# Patient Record
Sex: FEMALE | Race: WHITE | HISPANIC OR LATINO | Employment: UNEMPLOYED | URBAN - METROPOLITAN AREA
[De-identification: names, ages, dates, MRNs, and addresses within clinical notes are randomized per-mention and may not be internally consistent; named-entity substitution may affect disease eponyms.]

---

## 2021-02-26 ENCOUNTER — APPOINTMENT (OUTPATIENT)
Dept: RADIOLOGY | Facility: MEDICAL CENTER | Age: 51
End: 2021-02-26
Attending: EMERGENCY MEDICINE
Payer: MEDICAID

## 2021-02-26 ENCOUNTER — HOSPITAL ENCOUNTER (EMERGENCY)
Facility: MEDICAL CENTER | Age: 51
End: 2021-02-26
Attending: EMERGENCY MEDICINE
Payer: MEDICAID

## 2021-02-26 ENCOUNTER — APPOINTMENT (OUTPATIENT)
Dept: RADIOLOGY | Facility: MEDICAL CENTER | Age: 51
End: 2021-02-26
Payer: MEDICAID

## 2021-02-26 VITALS
RESPIRATION RATE: 27 BRPM | HEART RATE: 82 BPM | DIASTOLIC BLOOD PRESSURE: 65 MMHG | BODY MASS INDEX: 51.91 KG/M2 | WEIGHT: 293 LBS | SYSTOLIC BLOOD PRESSURE: 125 MMHG | HEIGHT: 63 IN | OXYGEN SATURATION: 90 % | TEMPERATURE: 97 F

## 2021-02-26 DIAGNOSIS — R60.0 BILATERAL LOWER EXTREMITY EDEMA: ICD-10-CM

## 2021-02-26 DIAGNOSIS — R06.09 DYSPNEA ON EXERTION: ICD-10-CM

## 2021-02-26 LAB
ALBUMIN SERPL BCP-MCNC: 3.6 G/DL (ref 3.2–4.9)
ALBUMIN/GLOB SERPL: 1 G/DL
ALP SERPL-CCNC: 98 U/L (ref 30–99)
ALT SERPL-CCNC: 44 U/L (ref 2–50)
ANION GAP SERPL CALC-SCNC: 12 MMOL/L (ref 7–16)
ANISOCYTOSIS BLD QL SMEAR: ABNORMAL
AST SERPL-CCNC: 67 U/L (ref 12–45)
BASOPHILS # BLD AUTO: 0 % (ref 0–1.8)
BASOPHILS # BLD: 0 K/UL (ref 0–0.12)
BILIRUB SERPL-MCNC: 0.7 MG/DL (ref 0.1–1.5)
BUN SERPL-MCNC: 12 MG/DL (ref 8–22)
CALCIUM SERPL-MCNC: 9.6 MG/DL (ref 8.5–10.5)
CHLORIDE SERPL-SCNC: 103 MMOL/L (ref 96–112)
CO2 SERPL-SCNC: 21 MMOL/L (ref 20–33)
CREAT SERPL-MCNC: 0.44 MG/DL (ref 0.5–1.4)
EKG IMPRESSION: NORMAL
EOSINOPHIL # BLD AUTO: 0.07 K/UL (ref 0–0.51)
EOSINOPHIL NFR BLD: 0.9 % (ref 0–6.9)
ERYTHROCYTE [DISTWIDTH] IN BLOOD BY AUTOMATED COUNT: 55.2 FL (ref 35.9–50)
GLOBULIN SER CALC-MCNC: 3.7 G/DL (ref 1.9–3.5)
GLUCOSE SERPL-MCNC: 189 MG/DL (ref 65–99)
HCT VFR BLD AUTO: 34.3 % (ref 37–47)
HGB BLD-MCNC: 10.3 G/DL (ref 12–16)
LYMPHOCYTES # BLD AUTO: 1.42 K/UL (ref 1–4.8)
LYMPHOCYTES NFR BLD: 17.5 % (ref 22–41)
MACROCYTES BLD QL SMEAR: ABNORMAL
MANUAL DIFF BLD: NORMAL
MCH RBC QN AUTO: 26.1 PG (ref 27–33)
MCHC RBC AUTO-ENTMCNC: 30 G/DL (ref 33.6–35)
MCV RBC AUTO: 86.8 FL (ref 81.4–97.8)
METAMYELOCYTES NFR BLD MANUAL: 0.9 %
MICROCYTES BLD QL SMEAR: ABNORMAL
MONOCYTES # BLD AUTO: 0.78 K/UL (ref 0–0.85)
MONOCYTES NFR BLD AUTO: 9.6 % (ref 0–13.4)
MORPHOLOGY BLD-IMP: NORMAL
NEUTROPHILS # BLD AUTO: 5.76 K/UL (ref 2–7.15)
NEUTROPHILS NFR BLD: 71.1 % (ref 44–72)
NRBC # BLD AUTO: 0.02 K/UL
NRBC BLD-RTO: 0.2 /100 WBC
NT-PROBNP SERPL IA-MCNC: 131 PG/ML (ref 0–125)
PLATELET # BLD AUTO: 333 K/UL (ref 164–446)
PLATELET BLD QL SMEAR: NORMAL
PMV BLD AUTO: 10.3 FL (ref 9–12.9)
POLYCHROMASIA BLD QL SMEAR: NORMAL
POTASSIUM SERPL-SCNC: 4.4 MMOL/L (ref 3.6–5.5)
PROT SERPL-MCNC: 7.3 G/DL (ref 6–8.2)
RBC # BLD AUTO: 3.95 M/UL (ref 4.2–5.4)
RBC BLD AUTO: PRESENT
SODIUM SERPL-SCNC: 136 MMOL/L (ref 135–145)
TROPONIN T SERPL-MCNC: 8 NG/L (ref 6–19)
WBC # BLD AUTO: 8.1 K/UL (ref 4.8–10.8)

## 2021-02-26 PROCEDURE — 80053 COMPREHEN METABOLIC PANEL: CPT

## 2021-02-26 PROCEDURE — 85007 BL SMEAR W/DIFF WBC COUNT: CPT

## 2021-02-26 PROCEDURE — 93005 ELECTROCARDIOGRAM TRACING: CPT

## 2021-02-26 PROCEDURE — 83880 ASSAY OF NATRIURETIC PEPTIDE: CPT

## 2021-02-26 PROCEDURE — 84484 ASSAY OF TROPONIN QUANT: CPT

## 2021-02-26 PROCEDURE — 99284 EMERGENCY DEPT VISIT MOD MDM: CPT

## 2021-02-26 PROCEDURE — 71045 X-RAY EXAM CHEST 1 VIEW: CPT

## 2021-02-26 PROCEDURE — 93970 EXTREMITY STUDY: CPT

## 2021-02-26 PROCEDURE — 85027 COMPLETE CBC AUTOMATED: CPT

## 2021-02-26 PROCEDURE — 93005 ELECTROCARDIOGRAM TRACING: CPT | Performed by: EMERGENCY MEDICINE

## 2021-02-26 RX ORDER — FUROSEMIDE 40 MG/1
40 TABLET ORAL 2 TIMES DAILY
COMMUNITY

## 2021-02-27 NOTE — ED PROVIDER NOTES
"ED Provider Note    CHIEF COMPLAINT  Chief Complaint   Patient presents with   • Leg Swelling     pt sent in by MD for bilat leg swelling x 2 weeks; denies cardiac hx or hx of DVT or PE; pt states her MD wants her to get a cardiac echo and leg US   • Shortness of Breath     with exertion   • Visual Problems     pt reports new onset trouble reading small print x 1 week        HPI    Primary care provider: No primary care provider on file.   History obtained from: Patient  History limited by: None     Moni Wray is a 50 y.o. female who presents to the ED complaining of worsening bilateral lower extremity swelling over the past 2 weeks as well as shortness of breath with exertion. She also noticed difficulty with reading small print for a week. Patient states that she was admitted to a hospital in [place] earlier this month for similar symptoms and was told that she has diabetes and \"big liver.\" She has been using the insulin and Lasix as prescribed without improvement. She denies any fever/congestion/sore throat/cough/nausea/vomiting/diarrhea/dysuria/rash. She denies possibility of pregnancy with history of tubal ligation and states that she is not sexually active. She denies any pain except knee pain \"because I have arthritis.\" She denies known history of heart problems or blood clots.    REVIEW OF SYSTEMS  Please see HPI for pertinent positives/negatives.  All other systems reviewed and are negative.     PAST MEDICAL HISTORY  Past Medical History:   Diagnosis Date   • Diabetes (HCC)         SURGICAL HISTORY  Past Surgical History:   Procedure Laterality Date   • CHOLECYSTECTOMY          SOCIAL HISTORY  Social History     Tobacco Use   • Smoking status: Current Some Day Smoker   • Smokeless tobacco: Never Used   Substance and Sexual Activity   • Alcohol use: Never   • Drug use: Never   • Sexual activity: Not on file        FAMILY HISTORY  History reviewed. No pertinent family history.     CURRENT " "MEDICATIONS  Home Medications     Reviewed by Linsey Lam R.N. (Registered Nurse) on 02/26/21 at 1634  Med List Status: Not Addressed   Medication Last Dose Status   furosemide (LASIX) 20 MG Tab  Active   insulin lispro (HUMALOG) 100 UNIT/ML  Active                 ALLERGIES  No Known Allergies     PHYSICAL EXAM  VITAL SIGNS: /65   Pulse 82   Temp 36.1 °C (97 °F) (Temporal)   Resp (!) 27   Ht 1.6 m (5' 3\")   Wt (!) 171 kg (376 lb 8.7 oz)   SpO2 90%   BMI 66.70 kg/m²  @[id number]::@     Pulse ox interpretation: 96% I interpret this pulse ox as normal     Cardiac monitor interpretation: Sinus rhythm with heart rate in the 90s as interpreted by me.  The patient presented with dyspnea and cardiac monitor was ordered to monitor for dysrhythmia.    Constitutional: Well developed, well nourished, alert in no apparent distress, nontoxic appearance    HENT: No external signs of trauma, normocephalic, mask on due to COVID-19 pandemic  Eyes: PERRL, conjunctiva without erythema, no discharge, no icterus    Neck: Soft and supple, trachea midline, no stridor, no tenderness, no LAD, no JVD, good ROM    Cardiovascular: Regular rate and rhythm, no murmurs/rubs/gallops, strong distal pulses and good perfusion    Thorax & Lungs: No respiratory distress, CTAB   Abdomen: Soft, nontender, nondistended, no guarding, no rebound, normal BS    Back: No CVAT    Extremities: No cyanosis, bilateral lower extremity edema, no gross deformity, good ROM, no tenderness, intact distal pulses with brisk cap refill    Skin: Warm, dry, no pallor/cyanosis, no rash noted except mild diffuse nondiscrete erythema of both lower legs  Lymphatic: No lymphadenopathy noted    Neuro: A/O times 3, no focal deficits noted    Psychiatric: Cooperative, normal mood and affect, normal judgement, appropriate for clinical situation        DIAGNOSTIC STUDIES / PROCEDURES    EKG  12 Lead EKG obtained at 1640 and interpreted by me:   Rate: 92   Rhythm: " Sinus rhythm   Ectopy: None  Intervals: Normal   Axis: LAD  QRS: Late precordial R wave transition  ST segments: Normal  T Waves: Normal    Clinical Impression: Sinus rhythm without acute ischemic changes or dysrhythmia       LABS  All labs reviewed by me.     Results for orders placed or performed during the hospital encounter of 02/26/21   CBC with Differential   Result Value Ref Range    WBC 8.1 4.8 - 10.8 K/uL    RBC 3.95 (L) 4.20 - 5.40 M/uL    Hemoglobin 10.3 (L) 12.0 - 16.0 g/dL    Hematocrit 34.3 (L) 37.0 - 47.0 %    MCV 86.8 81.4 - 97.8 fL    MCH 26.1 (L) 27.0 - 33.0 pg    MCHC 30.0 (L) 33.6 - 35.0 g/dL    RDW 55.2 (H) 35.9 - 50.0 fL    Platelet Count 333 164 - 446 K/uL    MPV 10.3 9.0 - 12.9 fL    Neutrophils-Polys 71.10 44.00 - 72.00 %    Lymphocytes 17.50 (L) 22.00 - 41.00 %    Monocytes 9.60 0.00 - 13.40 %    Eosinophils 0.90 0.00 - 6.90 %    Basophils 0.00 0.00 - 1.80 %    Nucleated RBC 0.20 /100 WBC    Neutrophils (Absolute) 5.76 2.00 - 7.15 K/uL    Lymphs (Absolute) 1.42 1.00 - 4.80 K/uL    Monos (Absolute) 0.78 0.00 - 0.85 K/uL    Eos (Absolute) 0.07 0.00 - 0.51 K/uL    Baso (Absolute) 0.00 0.00 - 0.12 K/uL    NRBC (Absolute) 0.02 K/uL    Anisocytosis 1+     Macrocytosis 1+     Microcytosis 1+    Complete Metabolic Panel (CMP)   Result Value Ref Range    Sodium 136 135 - 145 mmol/L    Potassium 4.4 3.6 - 5.5 mmol/L    Chloride 103 96 - 112 mmol/L    Co2 21 20 - 33 mmol/L    Anion Gap 12.0 7.0 - 16.0    Glucose 189 (H) 65 - 99 mg/dL    Bun 12 8 - 22 mg/dL    Creatinine 0.44 (L) 0.50 - 1.40 mg/dL    Calcium 9.6 8.5 - 10.5 mg/dL    AST(SGOT) 67 (H) 12 - 45 U/L    ALT(SGPT) 44 2 - 50 U/L    Alkaline Phosphatase 98 30 - 99 U/L    Total Bilirubin 0.7 0.1 - 1.5 mg/dL    Albumin 3.6 3.2 - 4.9 g/dL    Total Protein 7.3 6.0 - 8.2 g/dL    Globulin 3.7 (H) 1.9 - 3.5 g/dL    A-G Ratio 1.0 g/dL   Troponin   Result Value Ref Range    Troponin T 8 6 - 19 ng/L   proBrain Natriuretic Peptide, NT   Result Value Ref  Range    NT-proBNP 131 (H) 0 - 125 pg/mL   ESTIMATED GFR   Result Value Ref Range    GFR If African American >60 >60 mL/min/1.73 m 2    GFR If Non African American >60 >60 mL/min/1.73 m 2   DIFFERENTIAL MANUAL   Result Value Ref Range    Metamyelocytes 0.90 %    Manual Diff Status PERFORMED    PERIPHERAL SMEAR REVIEW   Result Value Ref Range    Peripheral Smear Review see below    PLATELET ESTIMATE   Result Value Ref Range    Plt Estimation Normal    MORPHOLOGY   Result Value Ref Range    RBC Morphology Present     Polychromia 1+    EKG   Result Value Ref Range    Report       [facility] Emergency Dept.    Test Date:  2021  Pt Name:    FRANCISCO MCKEON  Department: ER  MRN:        [id number]                      Room:  Gender:     Female                       Technician: [id number]  :        1970                   Requested By:ER TRIAGE PROTOCOL  Order #:    [id number]                    Reading MD:    Measurements  Intervals                                Axis  Rate:       92                           P:          34  DC:         152                          QRS:        -33  QRSD:       94                           T:          27  QT:         372  QTc:        461    Interpretive Statements  SINUS RHYTHM  LEFT AXIS DEVIATION  CONSIDER ANTERIOR INFARCT  No previous ECG available for comparison          RADIOLOGY  The radiologist's interpretation of all radiological studies have been reviewed by me.     US-EXTREMITY VENOUS LOWER BILAT         DX-CHEST-PORTABLE (1 VIEW)   Final Result      Mild cardiomegaly.             COURSE & MEDICAL DECISION MAKING  Nursing notes, VS, PMSFHx reviewed in chart.     Review of past medical records shows the patient without recent visits to this ED.      Differential diagnoses considered include but are not limited to: Fluid retention, DVT/vascular occlusion, AMI, pericardial effusion/tamponade, pericarditis, CHF/pulm edema, PE, pneumonia, pleural  effusion, DKA       Record was obtained from [facility] in [place].  Patient was seen on February 4, 2021 and work-up included CT angio of the chest with CT abdomen/pelvis without gross evidence of pulmonary embolism.  Patient had large fatty liver and otherwise no acute process in the chest/abdomen/pelvis.  Cardiac echo was performed without significant abnormality with a ejection fraction of 55 to 65%.  Abdominal ultrasound with findings of hepatomegaly, hepatic steatosis and cholecystectomy without other acute findings.  Work-up included possible cushingoid syndrome due to exogenous steroid intake from [place].  She was requested to follow-up with endocrinology and outpatient MRI for assessment of pituitary adenoma.      History and physical exam as above.  EKG was unremarkable.  Chest x-ray showed mild cardiomegaly as above.  Bilateral lower extremity ultrasound without evidence for DVT.  Labs are significant for mild anemia, mildly elevated AST and hyperglycemia without evidence for DKA.  I discussed the findings with the patient.  This is a very pleasant well-appearing patient in no acute distress and nontoxic in appearance clinically stable during her ED stay.  Record was obtained from [facility] in [place] and she had quite extensive work-up during her hospital stay.  She will need outpatient follow-up for monitoring and further evaluation but no apparent acute issues at this time to require admission.  I discussed the the plan with the patient and she is agreeable and states that she has follow-up with her primary care provider next week.  Return to ED precautions were given.  Patient verbalized understanding and agreed with plan of care with no further questions or concerns.      The patient is referred to a primary physician for blood pressure management, diabetic screening, and for all other preventative health concerns.       FINAL IMPRESSION  1. Bilateral lower extremity edema  Active   2. Dyspnea on exertion Active          DISPOSITION  Patient will be discharged home in stable condition.       FOLLOW UP  Please follow-up with your doctor    Call in 3 days      [facility], Emergency Dept  [address]  [place] [address] [phone]    If symptoms worsen         OUTPATIENT MEDICATIONS  Discharge Medication List as of 2/26/2021  9:34 PM             Electronically signed by: Ronak Hernandez D.O., 2/26/2021 6:17 PM      Portions of this record were made with voice recognition software.  Despite my review, spelling/grammar/context errors may still remain.  Interpretation of this chart should be taken in this context.

## 2021-02-27 NOTE — ED TRIAGE NOTES
Moni Wray  50 y.o.  Chief Complaint   Patient presents with   • Leg Swelling     pt sent in by MD for bilat leg swelling x 2 weeks; denies cardiac hx or hx of DVT or PE; pt states her MD wants her to get a cardiac echo and leg US   • Shortness of Breath     with exertion   • Visual Problems     pt reports new onset trouble reading small print x 1 week

## 2021-04-20 ENCOUNTER — APPOINTMENT (OUTPATIENT)
Dept: RADIOLOGY | Facility: MEDICAL CENTER | Age: 51
DRG: 177 | End: 2021-04-20
Attending: EMERGENCY MEDICINE
Payer: MEDICAID

## 2021-04-20 ENCOUNTER — HOSPITAL ENCOUNTER (INPATIENT)
Facility: MEDICAL CENTER | Age: 51
LOS: 18 days | DRG: 177 | End: 2021-05-08
Attending: EMERGENCY MEDICINE | Admitting: STUDENT IN AN ORGANIZED HEALTH CARE EDUCATION/TRAINING PROGRAM
Payer: MEDICAID

## 2021-04-20 DIAGNOSIS — U07.1 PNEUMONIA DUE TO COVID-19 VIRUS: ICD-10-CM

## 2021-04-20 DIAGNOSIS — J12.82 PNEUMONIA DUE TO COVID-19 VIRUS: ICD-10-CM

## 2021-04-20 DIAGNOSIS — J96.01 ACUTE RESPIRATORY FAILURE WITH HYPOXIA (HCC): ICD-10-CM

## 2021-04-20 PROBLEM — E66.01 MORBIDLY OBESE (HCC): Status: ACTIVE | Noted: 2021-04-20

## 2021-04-20 PROBLEM — E11.9 TYPE 2 DIABETES MELLITUS (HCC): Status: ACTIVE | Noted: 2021-04-20

## 2021-04-20 PROBLEM — R60.0 LOWER EXTREMITY EDEMA: Status: ACTIVE | Noted: 2021-04-20

## 2021-04-20 PROBLEM — R91.8 PULMONARY INFILTRATES: Status: ACTIVE | Noted: 2021-04-20

## 2021-04-20 LAB
ALBUMIN SERPL BCP-MCNC: 3.4 G/DL (ref 3.2–4.9)
ALBUMIN/GLOB SERPL: 0.9 G/DL
ALP SERPL-CCNC: 110 U/L (ref 30–99)
ALT SERPL-CCNC: 46 U/L (ref 2–50)
ANION GAP SERPL CALC-SCNC: 11 MMOL/L (ref 7–16)
ANISOCYTOSIS BLD QL SMEAR: ABNORMAL
AST SERPL-CCNC: 66 U/L (ref 12–45)
BASOPHILS # BLD AUTO: 0.9 % (ref 0–1.8)
BASOPHILS # BLD: 0.07 K/UL (ref 0–0.12)
BILIRUB SERPL-MCNC: 0.5 MG/DL (ref 0.1–1.5)
BUN SERPL-MCNC: 14 MG/DL (ref 8–22)
CALCIUM SERPL-MCNC: 8.8 MG/DL (ref 8.5–10.5)
CHLORIDE SERPL-SCNC: 102 MMOL/L (ref 96–112)
CO2 SERPL-SCNC: 26 MMOL/L (ref 20–33)
CREAT SERPL-MCNC: 0.55 MG/DL (ref 0.5–1.4)
CRP SERPL HS-MCNC: 21.94 MG/DL (ref 0–0.75)
D DIMER PPP IA.FEU-MCNC: 1.54 UG/ML (FEU) (ref 0–0.5)
EKG IMPRESSION: NORMAL
EOSINOPHIL # BLD AUTO: 0.07 K/UL (ref 0–0.51)
EOSINOPHIL NFR BLD: 0.9 % (ref 0–6.9)
ERYTHROCYTE [DISTWIDTH] IN BLOOD BY AUTOMATED COUNT: 52.3 FL (ref 35.9–50)
FLUAV RNA SPEC QL NAA+PROBE: NEGATIVE
FLUBV RNA SPEC QL NAA+PROBE: NEGATIVE
GLOBULIN SER CALC-MCNC: 3.9 G/DL (ref 1.9–3.5)
GLUCOSE BLD-MCNC: 156 MG/DL (ref 65–99)
GLUCOSE BLD-MCNC: 192 MG/DL (ref 65–99)
GLUCOSE SERPL-MCNC: 126 MG/DL (ref 65–99)
HCT VFR BLD AUTO: 33.9 % (ref 37–47)
HGB BLD-MCNC: 10.1 G/DL (ref 12–16)
HYPOCHROMIA BLD QL SMEAR: ABNORMAL
LACTATE BLD-SCNC: 1.3 MMOL/L (ref 0.5–2)
LYMPHOCYTES # BLD AUTO: 0.48 K/UL (ref 1–4.8)
LYMPHOCYTES NFR BLD: 6.1 % (ref 22–41)
MAGNESIUM SERPL-MCNC: 1.8 MG/DL (ref 1.5–2.5)
MANUAL DIFF BLD: NORMAL
MCH RBC QN AUTO: 23.2 PG (ref 27–33)
MCHC RBC AUTO-ENTMCNC: 29.8 G/DL (ref 33.6–35)
MCV RBC AUTO: 77.8 FL (ref 81.4–97.8)
MICROCYTES BLD QL SMEAR: ABNORMAL
MONOCYTES # BLD AUTO: 0.13 K/UL (ref 0–0.85)
MONOCYTES NFR BLD AUTO: 1.7 % (ref 0–13.4)
MORPHOLOGY BLD-IMP: NORMAL
NEUTROPHILS # BLD AUTO: 7.14 K/UL (ref 2–7.15)
NEUTROPHILS NFR BLD: 81.7 % (ref 44–72)
NEUTS BAND NFR BLD MANUAL: 8.7 % (ref 0–10)
NRBC # BLD AUTO: 0 K/UL
NRBC BLD-RTO: 0 /100 WBC
NT-PROBNP SERPL IA-MCNC: 89 PG/ML (ref 0–125)
PLATELET # BLD AUTO: 258 K/UL (ref 164–446)
PLATELET BLD QL SMEAR: NORMAL
PMV BLD AUTO: 9.6 FL (ref 9–12.9)
POLYCHROMASIA BLD QL SMEAR: NORMAL
POTASSIUM SERPL-SCNC: 3.4 MMOL/L (ref 3.6–5.5)
PROCALCITONIN SERPL-MCNC: 0.28 NG/ML
PROT SERPL-MCNC: 7.3 G/DL (ref 6–8.2)
RBC # BLD AUTO: 4.36 M/UL (ref 4.2–5.4)
RBC BLD AUTO: PRESENT
RSV RNA SPEC QL NAA+PROBE: NEGATIVE
SARS-COV-2 RNA RESP QL NAA+PROBE: DETECTED
SODIUM SERPL-SCNC: 139 MMOL/L (ref 135–145)
SPECIMEN SOURCE: ABNORMAL
WBC # BLD AUTO: 7.9 K/UL (ref 4.8–10.8)

## 2021-04-20 PROCEDURE — A9270 NON-COVERED ITEM OR SERVICE: HCPCS | Performed by: STUDENT IN AN ORGANIZED HEALTH CARE EDUCATION/TRAINING PROGRAM

## 2021-04-20 PROCEDURE — 84145 PROCALCITONIN (PCT): CPT

## 2021-04-20 PROCEDURE — 99285 EMERGENCY DEPT VISIT HI MDM: CPT

## 2021-04-20 PROCEDURE — 93005 ELECTROCARDIOGRAM TRACING: CPT

## 2021-04-20 PROCEDURE — 0241U HCHG SARS-COV-2 COVID-19 NFCT DS RESP RNA 4 TRGT MIC: CPT

## 2021-04-20 PROCEDURE — 36415 COLL VENOUS BLD VENIPUNCTURE: CPT

## 2021-04-20 PROCEDURE — 85379 FIBRIN DEGRADATION QUANT: CPT

## 2021-04-20 PROCEDURE — 700111 HCHG RX REV CODE 636 W/ 250 OVERRIDE (IP): Performed by: EMERGENCY MEDICINE

## 2021-04-20 PROCEDURE — 770006 HCHG ROOM/CARE - MED/SURG/GYN SEMI*

## 2021-04-20 PROCEDURE — 85007 BL SMEAR W/DIFF WBC COUNT: CPT

## 2021-04-20 PROCEDURE — 86140 C-REACTIVE PROTEIN: CPT

## 2021-04-20 PROCEDURE — 96374 THER/PROPH/DIAG INJ IV PUSH: CPT

## 2021-04-20 PROCEDURE — 700111 HCHG RX REV CODE 636 W/ 250 OVERRIDE (IP): Performed by: STUDENT IN AN ORGANIZED HEALTH CARE EDUCATION/TRAINING PROGRAM

## 2021-04-20 PROCEDURE — 8E0ZXY6 ISOLATION: ICD-10-PCS | Performed by: EMERGENCY MEDICINE

## 2021-04-20 PROCEDURE — 82962 GLUCOSE BLOOD TEST: CPT | Mod: 91

## 2021-04-20 PROCEDURE — 83735 ASSAY OF MAGNESIUM: CPT

## 2021-04-20 PROCEDURE — 71045 X-RAY EXAM CHEST 1 VIEW: CPT

## 2021-04-20 PROCEDURE — 80053 COMPREHEN METABOLIC PANEL: CPT

## 2021-04-20 PROCEDURE — 700102 HCHG RX REV CODE 250 W/ 637 OVERRIDE(OP): Performed by: STUDENT IN AN ORGANIZED HEALTH CARE EDUCATION/TRAINING PROGRAM

## 2021-04-20 PROCEDURE — 85027 COMPLETE CBC AUTOMATED: CPT

## 2021-04-20 PROCEDURE — 83880 ASSAY OF NATRIURETIC PEPTIDE: CPT

## 2021-04-20 PROCEDURE — 99223 1ST HOSP IP/OBS HIGH 75: CPT | Performed by: STUDENT IN AN ORGANIZED HEALTH CARE EDUCATION/TRAINING PROGRAM

## 2021-04-20 PROCEDURE — 83605 ASSAY OF LACTIC ACID: CPT

## 2021-04-20 PROCEDURE — C9803 HOPD COVID-19 SPEC COLLECT: HCPCS | Performed by: EMERGENCY MEDICINE

## 2021-04-20 PROCEDURE — 93005 ELECTROCARDIOGRAM TRACING: CPT | Performed by: EMERGENCY MEDICINE

## 2021-04-20 RX ORDER — DEXTROSE MONOHYDRATE 25 G/50ML
50 INJECTION, SOLUTION INTRAVENOUS
Status: DISCONTINUED | OUTPATIENT
Start: 2021-04-20 | End: 2021-04-21

## 2021-04-20 RX ORDER — CLONIDINE HYDROCHLORIDE 0.1 MG/1
0.1 TABLET ORAL EVERY 6 HOURS PRN
Status: DISCONTINUED | OUTPATIENT
Start: 2021-04-20 | End: 2021-05-08 | Stop reason: HOSPADM

## 2021-04-20 RX ORDER — ONDANSETRON 2 MG/ML
4 INJECTION INTRAMUSCULAR; INTRAVENOUS EVERY 4 HOURS PRN
Status: DISCONTINUED | OUTPATIENT
Start: 2021-04-20 | End: 2021-05-08 | Stop reason: HOSPADM

## 2021-04-20 RX ORDER — EMPAGLIFLOZIN AND METFORMIN HYDROCHLORIDE 12.5; 1 MG/1; MG/1
1 TABLET ORAL 2 TIMES DAILY
COMMUNITY

## 2021-04-20 RX ORDER — ONDANSETRON 4 MG/1
4 TABLET, ORALLY DISINTEGRATING ORAL EVERY 4 HOURS PRN
Status: DISCONTINUED | OUTPATIENT
Start: 2021-04-20 | End: 2021-05-08 | Stop reason: HOSPADM

## 2021-04-20 RX ORDER — FUROSEMIDE 10 MG/ML
20 INJECTION INTRAMUSCULAR; INTRAVENOUS ONCE
Status: COMPLETED | OUTPATIENT
Start: 2021-04-20 | End: 2021-04-20

## 2021-04-20 RX ORDER — ASPIRIN 100 %
1 POWDER (GRAM) MISCELLANEOUS EVERY 6 HOURS PRN
COMMUNITY

## 2021-04-20 RX ORDER — ACETAMINOPHEN 500 MG
1000 TABLET ORAL 3 TIMES DAILY
Status: DISCONTINUED | OUTPATIENT
Start: 2021-04-20 | End: 2021-05-04

## 2021-04-20 RX ORDER — IBUPROFEN 600 MG/1
600 TABLET ORAL EVERY 6 HOURS PRN
Status: DISCONTINUED | OUTPATIENT
Start: 2021-04-20 | End: 2021-05-08 | Stop reason: HOSPADM

## 2021-04-20 RX ORDER — PROMETHAZINE HYDROCHLORIDE 25 MG/1
12.5-25 SUPPOSITORY RECTAL EVERY 4 HOURS PRN
Status: DISCONTINUED | OUTPATIENT
Start: 2021-04-20 | End: 2021-05-08 | Stop reason: HOSPADM

## 2021-04-20 RX ORDER — BISACODYL 10 MG
10 SUPPOSITORY, RECTAL RECTAL
Status: DISCONTINUED | OUTPATIENT
Start: 2021-04-20 | End: 2021-05-08 | Stop reason: HOSPADM

## 2021-04-20 RX ORDER — NAPROXEN SODIUM 220 MG
440 TABLET ORAL 2 TIMES DAILY PRN
COMMUNITY

## 2021-04-20 RX ORDER — GUAIFENESIN/DEXTROMETHORPHAN 100-10MG/5
10 SYRUP ORAL EVERY 6 HOURS PRN
Status: DISCONTINUED | OUTPATIENT
Start: 2021-04-20 | End: 2021-05-08 | Stop reason: HOSPADM

## 2021-04-20 RX ORDER — PROCHLORPERAZINE EDISYLATE 5 MG/ML
5-10 INJECTION INTRAMUSCULAR; INTRAVENOUS EVERY 4 HOURS PRN
Status: DISCONTINUED | OUTPATIENT
Start: 2021-04-20 | End: 2021-05-08 | Stop reason: HOSPADM

## 2021-04-20 RX ORDER — AMOXICILLIN 250 MG
2 CAPSULE ORAL 2 TIMES DAILY
Status: DISCONTINUED | OUTPATIENT
Start: 2021-04-20 | End: 2021-05-08 | Stop reason: HOSPADM

## 2021-04-20 RX ORDER — POLYETHYLENE GLYCOL 3350 17 G/17G
1 POWDER, FOR SOLUTION ORAL
Status: DISCONTINUED | OUTPATIENT
Start: 2021-04-20 | End: 2021-05-08 | Stop reason: HOSPADM

## 2021-04-20 RX ORDER — PROMETHAZINE HYDROCHLORIDE 25 MG/1
12.5-25 TABLET ORAL EVERY 4 HOURS PRN
Status: DISCONTINUED | OUTPATIENT
Start: 2021-04-20 | End: 2021-05-08 | Stop reason: HOSPADM

## 2021-04-20 RX ORDER — DEXAMETHASONE 6 MG/1
6 TABLET ORAL DAILY
Status: DISCONTINUED | OUTPATIENT
Start: 2021-04-20 | End: 2021-05-03

## 2021-04-20 RX ORDER — MAGNESIUM SULFATE HEPTAHYDRATE 40 MG/ML
2 INJECTION, SOLUTION INTRAVENOUS ONCE
Status: COMPLETED | OUTPATIENT
Start: 2021-04-20 | End: 2021-04-20

## 2021-04-20 RX ADMIN — FUROSEMIDE 20 MG: 10 INJECTION, SOLUTION INTRAMUSCULAR; INTRAVENOUS at 14:44

## 2021-04-20 RX ADMIN — IBUPROFEN 600 MG: 600 TABLET ORAL at 23:25

## 2021-04-20 RX ADMIN — DEXAMETHASONE 6 MG: 4 TABLET ORAL at 17:07

## 2021-04-20 RX ADMIN — DOCUSATE SODIUM 50 MG AND SENNOSIDES 8.6 MG 2 TABLET: 8.6; 5 TABLET, FILM COATED ORAL at 18:43

## 2021-04-20 RX ADMIN — MAGNESIUM SULFATE 2 G: 2 INJECTION INTRAVENOUS at 20:09

## 2021-04-20 RX ADMIN — ACETAMINOPHEN 1000 MG: 500 TABLET ORAL at 18:43

## 2021-04-20 RX ADMIN — INSULIN LISPRO 1 UNITS: 100 INJECTION, SOLUTION INTRAVENOUS; SUBCUTANEOUS at 23:38

## 2021-04-20 ASSESSMENT — LIFESTYLE VARIABLES
ALCOHOL_USE: NO
EVER FELT BAD OR GUILTY ABOUT YOUR DRINKING: NO
HAVE YOU EVER FELT YOU SHOULD CUT DOWN ON YOUR DRINKING: NO
TOTAL SCORE: 0
HAVE PEOPLE ANNOYED YOU BY CRITICIZING YOUR DRINKING: NO
DOES PATIENT WANT TO STOP DRINKING: NO
TOTAL SCORE: 0
EVER HAD A DRINK FIRST THING IN THE MORNING TO STEADY YOUR NERVES TO GET RID OF A HANGOVER: NO
CONSUMPTION TOTAL: INCOMPLETE
TOTAL SCORE: 0

## 2021-04-20 ASSESSMENT — PATIENT HEALTH QUESTIONNAIRE - PHQ9
SUM OF ALL RESPONSES TO PHQ9 QUESTIONS 1 AND 2: 0
1. LITTLE INTEREST OR PLEASURE IN DOING THINGS: NOT AT ALL
2. FEELING DOWN, DEPRESSED, IRRITABLE, OR HOPELESS: NOT AT ALL

## 2021-04-20 ASSESSMENT — ENCOUNTER SYMPTOMS
MUSCULOSKELETAL NEGATIVE: 1
COUGH: 1
EYES NEGATIVE: 1
WEAKNESS: 1
PSYCHIATRIC NEGATIVE: 1
GASTROINTESTINAL NEGATIVE: 1
SHORTNESS OF BREATH: 1
ORTHOPNEA: 1

## 2021-04-20 ASSESSMENT — PAIN DESCRIPTION - PAIN TYPE
TYPE: CHRONIC PAIN
TYPE: CHRONIC PAIN

## 2021-04-20 ASSESSMENT — FIBROSIS 4 INDEX
FIB4 SCORE: 1.58
FIB4 SCORE: 1.96

## 2021-04-20 NOTE — ED TRIAGE NOTES
Pt to triage with c/o   Chief Complaint   Patient presents with   • Shortness of Breath     for the last 2 days, pt erports that she got her first covid vaccine, pt has been around her neice that was covid positive.  pt placed herself on 6 liters NC with her sisters oxygen.  slight non-productive cough.      Pt hasn't taking her lasix in 4 days.        Pt Informed regarding triage process and verbalized understanding to inform triage tech or RN for any changes in condition. Placed in [facility].

## 2021-04-20 NOTE — ED NOTES
Patient noted to be 79% on RA.  Back on 2lpm in 91%.    Used  to complete assessment. Mild work of breathing.  In NAD.  Speaking in full complete sentences.

## 2021-04-20 NOTE — ED PROVIDER NOTES
ED Provider Note    Scribed for Dr. Gonzalez Holm M.D. by Jonathan Freitas. 4/20/2021  2:10 PM    Primary care provider: None noted  Means of arrival: Ambulance   History obtained from: Patient   History limited by: None    CHIEF COMPLAINT  Chief Complaint   Patient presents with    Shortness of Breath     for the last 2 days, pt erports that she got her first covid vaccine, pt has been around her neice that was covid positive.  pt placed herself on 6 liters NC with her sisters oxygen.  slight non-productive cough.      PPE Note: I personally donned full PPE for all patient encounters during this visit, including being clean-shaven with an N95 respirator mask, gloves, gown, and goggles.     Scribe remained outside the patient's room and did not have any contact with the patient for the duration of patient encounter.     HPI  Moni Wray is a 50 y.o. female who presents to the Emergency Department for evaluation of acute, worsening shortness of breath onset two days ago. Over last few days, she has been feeling short of breath. She states that she received her first Covid-19 vaccine dose on 04/10/21 and she has been around her niece who recently tested positive for Covid. She has been using 6 L of supplemental oxygen at home since yesterday which she notes has mildly improved her shortness of breath. She does not use supplemental oxygen at home at baseline. The patient reports associated chest pain, fatigue, and dry cough. Negative for fever, vomiting, diarrhea, syncope, or headache. Her symptoms are not well alleviated with over the counter cold and flu medication. The patient has a history of asthma. She takes Lasix daily for lower extremity swelling, but denies any pulmonary history.      REVIEW OF SYSTEMS  Pertinent positives include shortness of breath. Pertinent negatives include no fever, vomiting, diarrhea, syncope, or headache. As above, all other systems reviewed and are negative.   See HPI  "for further details.     PAST MEDICAL HISTORY   has a past medical history of Diabetes (HCC).    SURGICAL HISTORY   has a past surgical history that includes cholecystectomy.    SOCIAL HISTORY  Social History     Tobacco Use    Smoking status: Current Some Day Smoker    Smokeless tobacco: Never Used   Substance Use Topics    Alcohol use: Never    Drug use: Never      Social History     Substance and Sexual Activity   Drug Use Never       FAMILY HISTORY  History reviewed. No pertinent family history.    CURRENT MEDICATIONS  Home Medications       Reviewed by Scott Leavitt (Pharmacy Tech) on 04/20/21 at 1546  Med List Status: Complete     Medication Last Dose Status   Aspirin Powder 4/19/2021 Active   Empagliflozin-metFORMIN HCl (SYNJARDY) 12.5-1000 MG Tab 4/19/2021 Active   furosemide (LASIX) 40 MG Tab ABOUT 4 DAYS AGO Active   naproxen (ANAPROX) 220 MG tablet 4/19/2021 Active                    ALLERGIES  No Known Allergies    PHYSICAL EXAM  VITAL SIGNS: /54   Pulse 78   Temp (!) 35.7 °C (96.2 °F) (Temporal)   Resp (!) 33   Ht 1.6 m (5' 3\")   Wt (!) 158 kg (348 lb 5.2 oz)   SpO2 (!) 81%   BMI 61.70 kg/m²     Constitutional: Well developed, Well nourished, Mild respiratory distress, Non-toxic appearance.   HENT: Normocephalic, Atraumatic, Bilateral external ears normal, Oropharynx moist, No oral exudates.   Eyes: PERRLA, EOMI, Conjunctiva normal, No discharge.   Neck: No tenderness, Supple, No stridor.   Lymphatic: No lymphadenopathy noted.   Cardiovascular: Tachycardic heart rate, Normal rhythm.   Thorax & Lungs: Severely diminished breath sounds bilaterally. Tachypnea, No wheezing, No crackles.   Abdomen: Obese, Soft, No tenderness, No masses, No pulsatile masses.   Skin: Warm, Dry, No erythema, No rash.   Extremities:, No edema No cyanosis.   Musculoskeletal: No tenderness to palpation or major deformities noted.  Intact distal pulses  Neurologic: Awake, alert. Moves all extremities " spontaneously.  Psychiatric: Affect normal, Judgment normal, Mood normal.     LABS  Results for orders placed or performed during the hospital encounter of 04/20/21   CBC with Differential   Result Value Ref Range    WBC 7.9 4.8 - 10.8 K/uL    RBC 4.36 4.20 - 5.40 M/uL    Hemoglobin 10.1 (L) 12.0 - 16.0 g/dL    Hematocrit 33.9 (L) 37.0 - 47.0 %    MCV 77.8 (L) 81.4 - 97.8 fL    MCH 23.2 (L) 27.0 - 33.0 pg    MCHC 29.8 (L) 33.6 - 35.0 g/dL    RDW 52.3 (H) 35.9 - 50.0 fL    Platelet Count 258 164 - 446 K/uL    MPV 9.6 9.0 - 12.9 fL    Neutrophils-Polys 81.70 (H) 44.00 - 72.00 %    Lymphocytes 6.10 (L) 22.00 - 41.00 %    Monocytes 1.70 0.00 - 13.40 %    Eosinophils 0.90 0.00 - 6.90 %    Basophils 0.90 0.00 - 1.80 %    Nucleated RBC 0.00 /100 WBC    Neutrophils (Absolute) 7.14 2.00 - 7.15 K/uL    Lymphs (Absolute) 0.48 (L) 1.00 - 4.80 K/uL    Monos (Absolute) 0.13 0.00 - 0.85 K/uL    Eos (Absolute) 0.07 0.00 - 0.51 K/uL    Baso (Absolute) 0.07 0.00 - 0.12 K/uL    NRBC (Absolute) 0.00 K/uL    Hypochromia 1+     Anisocytosis 1+     Microcytosis 1+    Comp Metabolic Panel   Result Value Ref Range    Sodium 139 135 - 145 mmol/L    Potassium 3.4 (L) 3.6 - 5.5 mmol/L    Chloride 102 96 - 112 mmol/L    Co2 26 20 - 33 mmol/L    Anion Gap 11.0 7.0 - 16.0    Glucose 126 (H) 65 - 99 mg/dL    Bun 14 8 - 22 mg/dL    Creatinine 0.55 0.50 - 1.40 mg/dL    Calcium 8.8 8.5 - 10.5 mg/dL    AST(SGOT) 66 (H) 12 - 45 U/L    ALT(SGPT) 46 2 - 50 U/L    Alkaline Phosphatase 110 (H) 30 - 99 U/L    Total Bilirubin 0.5 0.1 - 1.5 mg/dL    Albumin 3.4 3.2 - 4.9 g/dL    Total Protein 7.3 6.0 - 8.2 g/dL    Globulin 3.9 (H) 1.9 - 3.5 g/dL    A-G Ratio 0.9 g/dL   ESTIMATED GFR   Result Value Ref Range    GFR If African American >60 >60 mL/min/1.73 m 2    GFR If Non African American >60 >60 mL/min/1.73 m 2   PERIPHERAL SMEAR REVIEW   Result Value Ref Range    Peripheral Smear Review see below    PLATELET ESTIMATE   Result Value Ref Range    Plt  Estimation Normal    MORPHOLOGY   Result Value Ref Range    RBC Morphology Present     Polychromia 1+    DIFFERENTIAL MANUAL   Result Value Ref Range    Bands-Stabs 8.70 0.00 - 10.00 %    Manual Diff Status PERFORMED    LACTIC ACID   Result Value Ref Range    Lactic Acid 1.3 0.5 - 2.0 mmol/L   COV-2, FLU A/B, AND RSV BY PCR (2-4 HOURS CEPHEID): Collect NP swab in VTM    Specimen: Respirate   Result Value Ref Range    Influenza virus A RNA Negative Negative    Influenza virus B, PCR Negative Negative    RSV, PCR Negative Negative    SARS-CoV-2 by PCR DETECTED (AA)     SARS-CoV-2 Source NP Swab    D-DIMER   Result Value Ref Range    D-Dimer Screen 1.54 (H) 0.00 - 0.50 ug/mL (FEU)   CRP QUANTITIVE (NON-CARDIAC)   Result Value Ref Range    Stat C-Reactive Protein 21.94 (H) 0.00 - 0.75 mg/dL   MAGNESIUM   Result Value Ref Range    Magnesium 1.8 1.5 - 2.5 mg/dL   proBrain Natriuretic Peptide, NT   Result Value Ref Range    NT-proBNP 89 0 - 125 pg/mL   EKG   Result Value Ref Range    Report       [facility] Emergency Dept.    Test Date:  2021  Pt Name:    FRANCISCO MCKEON  Department: ER  MRN:        [id number]                      Room:  Gender:     Female                       Technician: [id number]  :        1970                   Requested By:ER TRIAGE PROTOCOL  Order #:    [id number]                    Reading MD:    Measurements  Intervals                                Axis  Rate:       76                           P:          27  MN:         136                          QRS:        -25  QRSD:       94                           T:          25  QT:         400  QTc:        450    Interpretive Statements  SINUS RHYTHM  BORDERLINE LEFT AXIS DEVIATION  BORDERLINE R WAVE PROGRESSION, ANTERIOR LEADS  Compared to ECG 2021 16:40:48  Myocardial infarct finding no longer present     POCT glucose device results   Result Value Ref Range    Glucose - Accu-Ck 156 (H) 65 - 99 mg/dL      All labs reviewed by me.    EKG  Interpreted by me as above    RADIOLOGY  DX-CHEST-PORTABLE (1 VIEW)   Final Result      New moderate to severe multifocal consolidation is compatible with Covid pneumonia favored over bacterial pneumonia      EC-ECHOCARDIOGRAM COMPLETE W/O CONT    (Results Pending)     The radiologist's interpretation of all radiological studies have been reviewed by me.    COURSE & MEDICAL DECISION MAKING  Pertinent Labs & Imaging studies reviewed. (See chart for details)    11:28 AM - Ordered for CMP, CBC with Differential, EKG, and DX Chest to evaluate the patient.     12:30 PM - Ordered for Differential Manual, Morphology, Platelet Estimate, and Peripheral Smear to evaluate the patient.     2:10 PM - Patient seen and examined at bedside. Patient will be treated with Lasix 20 mg. Ordered Lactic Acid and CoV-2 Flu A/B and RSV by PCR to evaluate her symptoms. The differential diagnoses include but are not limited to: Covid versus pneumonia versus CHF. I discussed radiology results with the patient which shows pneumonia suggestive of Covid 19. We are still awaiting her Covid-19 results. I discussed likelihood of admission as well. She verbalized agreement to this plan.      2:31 PM - Paged Hospitalist.     3:11 PM - I spoke with Dr. Villegas (Hospitalist) and updated him on the condition of the patient. He agrees to accept the patient under his care for further evaluation and treatment.     3:19 PM - Upon repeat evaluation, the patient is resting in bed with stable vitals. I updated her on lab and radiology results. Discussed admission into the hospital to Dr. Villegas for observation, She verbalized understanding and agreement with this plan.     Decision Making:  Is a 50-year-old female with diffuse infiltrates consistent with COVID-19 pneumonia she does test positive for COVID-19.  She is hypoxic on room air requiring oxygen, will need hospital admission discussed with hospitalist as noted above  and with patient    DISPOSITION:  Patient will be hospitalized by Dr. Villegas (Hospitalist) in stable condition.      FINAL IMPRESSION  1. Pneumonia due to COVID-19 virus          IJonathan (Scribe), am scribing for, and in the presence of, Gonzalez Holm M.D..    Electronically signed by: Jonathan Freitas (Natiibe), 4/20/2021    Gonzalez CARLTON M.D. personally performed the services described in this documentation, as scribed by Jonathan Freitas in my presence, and it is both accurate and complete.    C.     The note accurately reflects work and decisions made by me.  Gonzalez Holm M.D.  4/20/2021  9:51 PM

## 2021-04-20 NOTE — ASSESSMENT & PLAN NOTE
Contributing to diabetes acute hypoxic respiratory failure  Discussed diet and exercise prior to discharge

## 2021-04-20 NOTE — ED TRIAGE NOTES
"Patient vital signs rechecked and documented per [facility]. Patient denies any new needs at this time.  Patient updated on wait times, thanked for patience. Pt informed to alert triage tech or triage RN with any needs and/or changes in condition; patient verbalized understanding. Patient stated \"everything is the same\".   Patient continues to be on o2 6L NC at this time sats ate 98%  "

## 2021-04-20 NOTE — H&P
Hospital Medicine History & Physical Note    Date of Service  4/20/2021    Primary Care Physician  Pcp Pt States None    Consultants  None    Code Status  No Order    Chief Complaint  Chief Complaint   Patient presents with   • Shortness of Breath     for the last 2 days, pt erports that she got her first covid vaccine, pt has been around her neice that was covid positive.  pt placed herself on 6 liters NC with her sisters oxygen.  slight non-productive cough.        History of Presenting Illness  50 y.o. morbidly obese female with a past medical history of chronic lower extremity edema on Lasix presents emergency department on 4/20/2021 with a 2-day history of worsening dyspnea on exertion, and cough.  Patient reports that she has been exposed to her niece who recently tested positive for Covid.  She did get her first Covid dose on 4/10/2021.  Patient using 6 L nasal cannula at home that she is borrowing from family member which helps.  Patient denies any abdominal pain, fever, chills, diarrhea, headaches, syncope, orthostatic changes or loss of consciousness.     At the emergency department, vital signs with tachypnea up to the 30s, hypotension with SBP in the 90s.  Patient requiring 2 L nasal cannula to maintain saturations. CBC with microcytic anemia with elevated RDW, no leukocytosis.  Chemistry with mild hypokalemia, AST 66.  Lactic acid 1.3.  Covid nasopharyngeal swab positive. Chest x-ray showing moderate to severe multifocal consolidation which is compatible with Covid pneumonia. He received one dose of lasix.  Patient was admitted for acute hypoxic respiratory failure secondary to Covid pneumonia.     Review of Systems  Review of Systems   Constitutional: Positive for malaise/fatigue.   HENT: Negative.    Eyes: Negative.    Respiratory: Positive for cough and shortness of breath.    Cardiovascular: Positive for orthopnea and leg swelling.   Gastrointestinal: Negative.    Genitourinary: Negative.     Musculoskeletal: Negative.    Skin: Negative.    Neurological: Positive for weakness.   Endo/Heme/Allergies: Negative.    Psychiatric/Behavioral: Negative.        Past Medical History   has a past medical history of Diabetes (HCC).    Surgical History   has a past surgical history that includes cholecystectomy.     Family History  Reviewed and noncontributory    Social History   reports that she has been smoking. She has never used smokeless tobacco. She reports that she does not drink alcohol and does not use drugs.    Allergies  No Known Allergies    Medications  Prior to Admission Medications   Prescriptions Last Dose Informant Patient Reported? Taking?   Empagliflozin-metFORMIN HCl (SYNJARDY) 12.5-1000 MG Tab 4/19/2021 at Unknown time  Yes Yes   Sig: Take 1 tablet by mouth 2 times a day.   furosemide (LASIX) 20 MG Tab 4/17/2021 at Unknown time  Yes No   Sig: Take 20 mg by mouth 2 times a day.   insulin lispro (HUMALOG) 100 UNIT/ML not taking  Yes No   Sig: Inject  under the skin 3 times a day before meals.      Facility-Administered Medications: None       Physical Exam  Temp:  [35.7 °C (96.2 °F)-36.1 °C (96.9 °F)] 35.7 °C (96.2 °F)  Pulse:  [73-83] 75  Resp:  [18-33] 24  BP: ()/(51-93) 94/51  SpO2:  [81 %-98 %] 92 %    Physical Exam  Constitutional:       General: She is not in acute distress.     Appearance: Normal appearance. She is obese. She is ill-appearing. She is not toxic-appearing or diaphoretic.   HENT:      Head: Normocephalic and atraumatic.      Mouth/Throat:      Mouth: Mucous membranes are moist.   Eyes:      Extraocular Movements: Extraocular movements intact.      Pupils: Pupils are equal, round, and reactive to light.   Cardiovascular:      Rate and Rhythm: Normal rate and regular rhythm.      Pulses: Normal pulses.      Heart sounds: Normal heart sounds.   Pulmonary:      Effort: Pulmonary effort is normal.      Breath sounds: Rales present.   Abdominal:      General: Bowel sounds are  normal. There is distension.      Palpations: Abdomen is soft.      Tenderness: There is no abdominal tenderness.   Musculoskeletal:         General: Swelling present. Normal range of motion.      Cervical back: Normal range of motion and neck supple.      Right lower leg: Edema present.      Left lower leg: Edema present.   Skin:     General: Skin is warm.      Coloration: Skin is not jaundiced.   Neurological:      General: No focal deficit present.      Mental Status: She is alert and oriented to person, place, and time. Mental status is at baseline.      Cranial Nerves: No cranial nerve deficit.   Psychiatric:         Mood and Affect: Mood normal.         Behavior: Behavior normal.         Thought Content: Thought content normal.         Judgment: Judgment normal.         Laboratory:  Recent Labs     04/20/21  1230   WBC 7.9   RBC 4.36   HEMOGLOBIN 10.1*   HEMATOCRIT 33.9*   MCV 77.8*   MCH 23.2*   MCHC 29.8*   RDW 52.3*   PLATELETCT 258   MPV 9.6     Recent Labs     04/20/21  1230   SODIUM 139   POTASSIUM 3.4*   CHLORIDE 102   CO2 26   GLUCOSE 126*   BUN 14   CREATININE 0.55   CALCIUM 8.8     Recent Labs     04/20/21  1230   ALTSGPT 46   ASTSGOT 66*   ALKPHOSPHAT 110*   TBILIRUBIN 0.5   GLUCOSE 126*         No results for input(s): NTPROBNP in the last 72 hours.      No results for input(s): TROPONINT in the last 72 hours.    Imaging:  DX-CHEST-PORTABLE (1 VIEW)   Final Result      New moderate to severe multifocal consolidation is compatible with Covid pneumonia favored over bacterial pneumonia            Assessment/Plan:  I anticipate this patient will require at least two midnights for appropriate medical management, necessitating inpatient admission.    * Acute respiratory failure with hypoxia (HCC)- (present on admission)  Assessment & Plan  Requiring 6 L NC at ED which decreased to 2 L nasal cannula after receiving Lasix  Chronic history of bilateral extremity edema without CHF work-up and on Lasix at  home  Chest x-ray with bilateral pulmonary infiltrates, suggestive of Covid as per radiology  Received IV Lasix 40 at ED  Admit to medical floor  Fluid restriction  Diuresis with Lasix  I's and O's  Daily weights  Echocardiogram  Titrate oxygen as tolerable  Labs on AM    Pneumonia due to COVID-19 virus- (present on admission)  Assessment & Plan  Chest x-ray nasopharyngeal swab  Complicated by acute hypoxic respiratory failure  Patient requiring 6 L at baseline  Admit to Covid floor  Start Decadron regimen  IV Lasix  Pending D-dimer, procalcitonin and CRP  Incentive spirometer and frequent urination  Titrate oxygen as tolerable  Labs on a.m.    Morbidly obese (HCC)  Assessment & Plan  Contributing to diabetes acute hypoxic respiratory failure  Discussed diet and exercise prior to discharge    Lower extremity edema- (present on admission)  Assessment & Plan  Continue Lasix    Pulmonary infiltrates- (present on admission)  Assessment & Plan  Noted on Chest x-ray  Suggestive of covid  Pending Covid swab  Continue diuresis  RT/O2  Titrate O2    Type 2 diabetes mellitus (HCC)- (present on admission)  Assessment & Plan  Hold home Metformin  Insulin sliding scale  Frequent Accu-Cheks  Repeat A1c    DVT prophylaxis Lovenox

## 2021-04-20 NOTE — ASSESSMENT & PLAN NOTE
Received Decadron, Tocilizumab, azithromycin  No longer on high flow nasal cannula.    Continue weaning O2, I-S, ambulation

## 2021-04-21 ENCOUNTER — APPOINTMENT (OUTPATIENT)
Dept: CARDIOLOGY | Facility: MEDICAL CENTER | Age: 51
DRG: 177 | End: 2021-04-21
Attending: STUDENT IN AN ORGANIZED HEALTH CARE EDUCATION/TRAINING PROGRAM
Payer: MEDICAID

## 2021-04-21 LAB
ALBUMIN SERPL BCP-MCNC: 3.3 G/DL (ref 3.2–4.9)
ALBUMIN/GLOB SERPL: 0.8 G/DL
ALP SERPL-CCNC: 115 U/L (ref 30–99)
ALT SERPL-CCNC: 41 U/L (ref 2–50)
ANION GAP SERPL CALC-SCNC: 6 MMOL/L (ref 7–16)
AST SERPL-CCNC: 58 U/L (ref 12–45)
BASOPHILS # BLD AUTO: 0 % (ref 0–1.8)
BASOPHILS # BLD: 0 K/UL (ref 0–0.12)
BILIRUB SERPL-MCNC: 0.5 MG/DL (ref 0.1–1.5)
BLOOD CULTURE HOLD CXBCH: NORMAL
BLOOD CULTURE HOLD CXBCH: NORMAL
BUN SERPL-MCNC: 14 MG/DL (ref 8–22)
CALCIUM SERPL-MCNC: 9.3 MG/DL (ref 8.5–10.5)
CHLORIDE SERPL-SCNC: 104 MMOL/L (ref 96–112)
CHOLEST SERPL-MCNC: 106 MG/DL (ref 100–199)
CO2 SERPL-SCNC: 27 MMOL/L (ref 20–33)
CREAT SERPL-MCNC: 0.48 MG/DL (ref 0.5–1.4)
EOSINOPHIL # BLD AUTO: 0 K/UL (ref 0–0.51)
EOSINOPHIL NFR BLD: 0 % (ref 0–6.9)
ERYTHROCYTE [DISTWIDTH] IN BLOOD BY AUTOMATED COUNT: 52.5 FL (ref 35.9–50)
GLOBULIN SER CALC-MCNC: 4 G/DL (ref 1.9–3.5)
GLUCOSE BLD-MCNC: 150 MG/DL (ref 65–99)
GLUCOSE BLD-MCNC: 162 MG/DL (ref 65–99)
GLUCOSE BLD-MCNC: 173 MG/DL (ref 65–99)
GLUCOSE BLD-MCNC: 229 MG/DL (ref 65–99)
GLUCOSE SERPL-MCNC: 169 MG/DL (ref 65–99)
HAV IGM SERPL QL IA: NORMAL
HBV CORE IGM SER QL: NORMAL
HBV SURFACE AG SER QL: NORMAL
HCT VFR BLD AUTO: 34.4 % (ref 37–47)
HCV AB SER QL: NORMAL
HDLC SERPL-MCNC: 36 MG/DL
HGB BLD-MCNC: 9.9 G/DL (ref 12–16)
IMM GRANULOCYTES # BLD AUTO: 0.06 K/UL (ref 0–0.11)
IMM GRANULOCYTES NFR BLD AUTO: 0.6 % (ref 0–0.9)
LDLC SERPL CALC-MCNC: 55 MG/DL
LV EJECT FRACT  99904: 65
LV EJECT FRACT MOD 2C 99903: 70.04
LV EJECT FRACT MOD 4C 99902: 68.95
LV EJECT FRACT MOD BP 99901: 67.36
LYMPHOCYTES # BLD AUTO: 0.76 K/UL (ref 1–4.8)
LYMPHOCYTES NFR BLD: 7.7 % (ref 22–41)
MCH RBC QN AUTO: 22.6 PG (ref 27–33)
MCHC RBC AUTO-ENTMCNC: 28.8 G/DL (ref 33.6–35)
MCV RBC AUTO: 78.4 FL (ref 81.4–97.8)
MONOCYTES # BLD AUTO: 0.24 K/UL (ref 0–0.85)
MONOCYTES NFR BLD AUTO: 2.4 % (ref 0–13.4)
NEUTROPHILS # BLD AUTO: 8.87 K/UL (ref 2–7.15)
NEUTROPHILS NFR BLD: 89.3 % (ref 44–72)
NRBC # BLD AUTO: 0 K/UL
NRBC BLD-RTO: 0 /100 WBC
PLATELET # BLD AUTO: 285 K/UL (ref 164–446)
PMV BLD AUTO: 10.3 FL (ref 9–12.9)
POTASSIUM SERPL-SCNC: 3.9 MMOL/L (ref 3.6–5.5)
PROT SERPL-MCNC: 7.3 G/DL (ref 6–8.2)
RBC # BLD AUTO: 4.39 M/UL (ref 4.2–5.4)
SODIUM SERPL-SCNC: 137 MMOL/L (ref 135–145)
TRIGL SERPL-MCNC: 74 MG/DL (ref 0–149)
WBC # BLD AUTO: 9.9 K/UL (ref 4.8–10.8)

## 2021-04-21 PROCEDURE — 82962 GLUCOSE BLOOD TEST: CPT

## 2021-04-21 PROCEDURE — A9270 NON-COVERED ITEM OR SERVICE: HCPCS | Performed by: STUDENT IN AN ORGANIZED HEALTH CARE EDUCATION/TRAINING PROGRAM

## 2021-04-21 PROCEDURE — 94640 AIRWAY INHALATION TREATMENT: CPT

## 2021-04-21 PROCEDURE — 5A0955A ASSISTANCE WITH RESPIRATORY VENTILATION, GREATER THAN 96 CONSECUTIVE HOURS, HIGH NASAL FLOW/VELOCITY: ICD-10-PCS | Performed by: STUDENT IN AN ORGANIZED HEALTH CARE EDUCATION/TRAINING PROGRAM

## 2021-04-21 PROCEDURE — 93306 TTE W/DOPPLER COMPLETE: CPT

## 2021-04-21 PROCEDURE — 36415 COLL VENOUS BLD VENIPUNCTURE: CPT

## 2021-04-21 PROCEDURE — 80074 ACUTE HEPATITIS PANEL: CPT

## 2021-04-21 PROCEDURE — 700111 HCHG RX REV CODE 636 W/ 250 OVERRIDE (IP): Performed by: INTERNAL MEDICINE

## 2021-04-21 PROCEDURE — 99233 SBSQ HOSP IP/OBS HIGH 50: CPT | Performed by: HOSPITALIST

## 2021-04-21 PROCEDURE — 86480 TB TEST CELL IMMUN MEASURE: CPT

## 2021-04-21 PROCEDURE — 80053 COMPREHEN METABOLIC PANEL: CPT

## 2021-04-21 PROCEDURE — XW033H5 INTRODUCTION OF TOCILIZUMAB INTO PERIPHERAL VEIN, PERCUTANEOUS APPROACH, NEW TECHNOLOGY GROUP 5: ICD-10-PCS | Performed by: INTERNAL MEDICINE

## 2021-04-21 PROCEDURE — 700111 HCHG RX REV CODE 636 W/ 250 OVERRIDE (IP): Performed by: STUDENT IN AN ORGANIZED HEALTH CARE EDUCATION/TRAINING PROGRAM

## 2021-04-21 PROCEDURE — 700105 HCHG RX REV CODE 258: Performed by: INTERNAL MEDICINE

## 2021-04-21 PROCEDURE — 770006 HCHG ROOM/CARE - MED/SURG/GYN SEMI*

## 2021-04-21 PROCEDURE — 93306 TTE W/DOPPLER COMPLETE: CPT | Mod: 26 | Performed by: INTERNAL MEDICINE

## 2021-04-21 PROCEDURE — 85025 COMPLETE CBC W/AUTO DIFF WBC: CPT

## 2021-04-21 PROCEDURE — 700102 HCHG RX REV CODE 250 W/ 637 OVERRIDE(OP): Performed by: STUDENT IN AN ORGANIZED HEALTH CARE EDUCATION/TRAINING PROGRAM

## 2021-04-21 PROCEDURE — 80061 LIPID PANEL: CPT

## 2021-04-21 RX ORDER — DEXTROSE MONOHYDRATE 25 G/50ML
50 INJECTION, SOLUTION INTRAVENOUS
Status: DISCONTINUED | OUTPATIENT
Start: 2021-04-21 | End: 2021-04-21

## 2021-04-21 RX ORDER — DEXTROSE MONOHYDRATE 25 G/50ML
50 INJECTION, SOLUTION INTRAVENOUS
Status: DISCONTINUED | OUTPATIENT
Start: 2021-04-21 | End: 2021-05-08 | Stop reason: HOSPADM

## 2021-04-21 RX ADMIN — TOCILIZUMAB 400 MG: 20 INJECTION, SOLUTION, CONCENTRATE INTRAVENOUS at 11:09

## 2021-04-21 RX ADMIN — ACETAMINOPHEN 1000 MG: 500 TABLET ORAL at 06:00

## 2021-04-21 RX ADMIN — DEXAMETHASONE 6 MG: 4 TABLET ORAL at 06:00

## 2021-04-21 RX ADMIN — ACETAMINOPHEN 1000 MG: 500 TABLET ORAL at 17:34

## 2021-04-21 RX ADMIN — ACETAMINOPHEN 1000 MG: 500 TABLET ORAL at 11:36

## 2021-04-21 RX ADMIN — INSULIN LISPRO 2 UNITS: 100 INJECTION, SOLUTION INTRAVENOUS; SUBCUTANEOUS at 11:36

## 2021-04-21 RX ADMIN — INSULIN LISPRO 1 UNITS: 100 INJECTION, SOLUTION INTRAVENOUS; SUBCUTANEOUS at 17:34

## 2021-04-21 RX ADMIN — ENOXAPARIN SODIUM 40 MG: 40 INJECTION SUBCUTANEOUS at 06:00

## 2021-04-21 RX ADMIN — DOCUSATE SODIUM 50 MG AND SENNOSIDES 8.6 MG 2 TABLET: 8.6; 5 TABLET, FILM COATED ORAL at 17:34

## 2021-04-21 RX ADMIN — DOCUSATE SODIUM 50 MG AND SENNOSIDES 8.6 MG 2 TABLET: 8.6; 5 TABLET, FILM COATED ORAL at 06:00

## 2021-04-21 ASSESSMENT — ENCOUNTER SYMPTOMS
PALPITATIONS: 0
SHORTNESS OF BREATH: 1
EYE PAIN: 0
NEUROLOGICAL NEGATIVE: 1
ABDOMINAL PAIN: 0
WEIGHT LOSS: 0
PSYCHIATRIC NEGATIVE: 1
MYALGIAS: 0
CHILLS: 0
BLURRED VISION: 0
FEVER: 0
NECK PAIN: 0
COUGH: 1
CLAUDICATION: 0
NAUSEA: 0
HEARTBURN: 0
VOMITING: 0
BACK PAIN: 0
PHOTOPHOBIA: 0
DOUBLE VISION: 0

## 2021-04-21 ASSESSMENT — COPD QUESTIONNAIRES
HAVE YOU SMOKED AT LEAST 100 CIGARETTES IN YOUR ENTIRE LIFE: NO/DON'T KNOW
DO YOU EVER COUGH UP ANY MUCUS OR PHLEGM?: NO/ONLY WITH OCCASIONAL COLDS OR INFECTIONS
COPD SCREENING SCORE: 1
DURING THE PAST 4 WEEKS HOW MUCH DID YOU FEEL SHORT OF BREATH: NONE/LITTLE OF THE TIME

## 2021-04-21 NOTE — ASSESSMENT & PLAN NOTE
Complicated by acute hypoxic respiratory failure  Received Decadron and Tocilizumab  Robitussin and tessalon  Titrate oxygen as tolerable

## 2021-04-21 NOTE — CARE PLAN
Problem: Safety  Goal: Will remain free from injury  Outcome: PROGRESSING AS EXPECTED  Note: Call light within reach, bed in lowest, locked position, pt educated on fall reduction  Goal: Will remain free from falls  Outcome: PROGRESSING AS EXPECTED     Problem: Infection  Goal: Will remain free from infection  Outcome: PROGRESSING SLOWER THAN EXPECTED  Note: Pt with active covid infection, educated on reducing infection transmission

## 2021-04-21 NOTE — PROGRESS NOTES
Hospital Medicine Daily Progress Note    Date of Service  4/21/2021    Chief Complaint  50 y.o. female admitted 4/20/2021 with sob    Hospital Course  No notes on file    Interval Problem Update  Now on HFNC     Sob persists    malaise      Afebrile    I d/w id md dr espinosa    Consultants/Specialty  Id md    Code Status  Full Code    Disposition  pending    Review of Systems  Review of Systems   Constitutional: Positive for malaise/fatigue. Negative for chills, fever and weight loss.   HENT: Negative for ear pain, hearing loss and tinnitus.    Eyes: Negative for blurred vision, double vision, photophobia and pain.   Respiratory: Positive for cough and shortness of breath.    Cardiovascular: Negative for chest pain, palpitations and claudication.   Gastrointestinal: Negative for abdominal pain, heartburn, nausea and vomiting.   Genitourinary: Negative for dysuria, frequency, hematuria and urgency.   Musculoskeletal: Negative for back pain, joint pain, myalgias and neck pain.   Neurological: Negative.    Psychiatric/Behavioral: Negative.    All other systems reviewed and are negative.       Physical Exam  Temp:  [35.7 °C (96.2 °F)-36.2 °C (97.2 °F)] 36.2 °C (97.2 °F)  Pulse:  [73-90] 81  Resp:  [18-33] 18  BP: ()/(50-70) 116/70  SpO2:  [81 %-98 %] 86 %    Physical Exam  Vitals reviewed.   Constitutional:       General: She is not in acute distress.     Appearance: She is ill-appearing. She is not toxic-appearing or diaphoretic.   HENT:      Head: Normocephalic and atraumatic.      Nose: Nose normal.      Mouth/Throat:      Mouth: Mucous membranes are moist.   Eyes:      General: No scleral icterus.        Left eye: No discharge.      Extraocular Movements: Extraocular movements intact.      Pupils: Pupils are equal, round, and reactive to light.   Cardiovascular:      Rate and Rhythm: Normal rate and regular rhythm.      Pulses: Normal pulses.      Heart sounds: No gallop.    Pulmonary:      Comments: Decrease  bs at bases  Abdominal:      General: There is distension.      Palpations: There is no mass.      Tenderness: There is no right CVA tenderness, left CVA tenderness or guarding.      Hernia: No hernia is present.   Musculoskeletal:         General: No swelling, tenderness or deformity. Normal range of motion.      Cervical back: Normal range of motion and neck supple. No rigidity.      Right lower leg: Edema present.      Left lower leg: Edema present.   Lymphadenopathy:      Cervical: No cervical adenopathy.   Skin:     General: Skin is warm.      Capillary Refill: Capillary refill takes 2 to 3 seconds.      Coloration: Skin is not jaundiced or pale.      Findings: No bruising, erythema, lesion or rash.   Neurological:      General: No focal deficit present.      Mental Status: She is alert and oriented to person, place, and time.      Cranial Nerves: No cranial nerve deficit.      Motor: No weakness.      Gait: Gait normal.   Psychiatric:         Mood and Affect: Mood normal.         Fluids    Intake/Output Summary (Last 24 hours) at 4/21/2021 1234  Last data filed at 4/21/2021 0700  Gross per 24 hour   Intake 250 ml   Output --   Net 250 ml       Laboratory  Recent Labs     04/20/21  1230 04/21/21  0225   WBC 7.9 9.9   RBC 4.36 4.39   HEMOGLOBIN 10.1* 9.9*   HEMATOCRIT 33.9* 34.4*   MCV 77.8* 78.4*   MCH 23.2* 22.6*   MCHC 29.8* 28.8*   RDW 52.3* 52.5*   PLATELETCT 258 285   MPV 9.6 10.3     Recent Labs     04/20/21  1230 04/21/21  0225   SODIUM 139 137   POTASSIUM 3.4* 3.9   CHLORIDE 102 104   CO2 26 27   GLUCOSE 126* 169*   BUN 14 14   CREATININE 0.55 0.48*   CALCIUM 8.8 9.3             Recent Labs     04/21/21  0225   TRIGLYCERIDE 74   HDL 36*   LDL 55       Imaging  EC-ECHOCARDIOGRAM COMPLETE W/O CONT         DX-CHEST-PORTABLE (1 VIEW)   Final Result      New moderate to severe multifocal consolidation is compatible with Covid pneumonia favored over bacterial pneumonia           Assessment/Plan  * Acute  respiratory failure with hypoxia (HCC)- (present on admission)  Assessment & Plan    Chronic history of bilateral extremity edema without CHF and on Lasix at home    Fluid restriction  Diuresis with Lasix  I's and O's  Daily weights  Echocardiogram  Titrate oxygen as tolerable      Pneumonia due to COVID-19 virus- (present on admission)  Assessment & Plan    Complicated by acute hypoxic respiratory failure    Cont Decadron   IV Lasix  procalcitonin negative    Titrate oxygen as tolerable  Labs on a.m.    Morbidly obese (HCC)- (present on admission)  Assessment & Plan  Contributing to diabetes acute hypoxic respiratory failure  Discussed diet and exercise prior to discharge    Lower extremity edema- (present on admission)  Assessment & Plan  Continue Lasix    Pulmonary infiltrates- (present on admission)  Assessment & Plan  Noted on Chest x-ray  Suggestive of covid    Continue diuresis  RT/O2  Titrate O2    Type 2 diabetes mellitus (HCC)- (present on admission)  Assessment & Plan  Hold home Metformin  Insulin sliding scale   Accu-Cheks         VTE prophylaxis: lovenox    Check am cbc, bmp

## 2021-04-21 NOTE — DIETARY
NUTRITION SERVICES: BMI - Pt with BMI >40 (=Body mass index is 61.09 kg/m².), Class III obesity. Weight loss counseling not appropriate in acute care setting. RECOMMEND - If appropriate at DC please refer to outpatient nutrition services for weight management.

## 2021-04-21 NOTE — PROGRESS NOTES
ID evaluation for remdesivir/tocilizumab  COVID + 4/20/2021 Symptoms for 2 days  O2 35 in less than 24 hours  Renal and liver function acceptable  CRP 21.94  CXR Bilateral infiltrates right greater than left  Procalcitonin 0.28  BMI 61  WBC 9.9  Not high risk for GI bleed    Continue supportive care with oxygen supplementation, proning, judicious use of IV fluids  Monitor inflammatory markers every 48 hours as indicated  Prophylactic anticoagulation not recommended unless high risk   Agree with dexamethasone 6 mg PO daily     Meets [facility] criteria for toci  CHeck baseline hepatitis profile and QuantiGold if not already done  Discussed with Pharmacy    Please call if we can be of further assistance

## 2021-04-21 NOTE — PROGRESS NOTES
Pt handed off on 6 L NC, RN bumped oxygen to 10 L oxymask in order to maintain a saturation of 92-94%. At 0100 pt desatting, requiring nonrebreather at 15L. Pt is now satting 95% with NRB and laying prone. Respiratory called.

## 2021-04-22 PROBLEM — R34 DECREASED URINE OUTPUT: Status: ACTIVE | Noted: 2021-04-22

## 2021-04-22 LAB
ALBUMIN SERPL BCP-MCNC: 3.1 G/DL (ref 3.2–4.9)
ALBUMIN/GLOB SERPL: 0.8 G/DL
ALP SERPL-CCNC: 111 U/L (ref 30–99)
ALT SERPL-CCNC: 34 U/L (ref 2–50)
ANION GAP SERPL CALC-SCNC: 6 MMOL/L (ref 7–16)
AST SERPL-CCNC: 55 U/L (ref 12–45)
BILIRUB SERPL-MCNC: 0.4 MG/DL (ref 0.1–1.5)
BUN SERPL-MCNC: 17 MG/DL (ref 8–22)
CALCIUM SERPL-MCNC: 9 MG/DL (ref 8.5–10.5)
CHLORIDE SERPL-SCNC: 105 MMOL/L (ref 96–112)
CO2 SERPL-SCNC: 27 MMOL/L (ref 20–33)
CREAT SERPL-MCNC: 0.32 MG/DL (ref 0.5–1.4)
ERYTHROCYTE [DISTWIDTH] IN BLOOD BY AUTOMATED COUNT: 52.5 FL (ref 35.9–50)
GLOBULIN SER CALC-MCNC: 3.8 G/DL (ref 1.9–3.5)
GLUCOSE BLD-MCNC: 172 MG/DL (ref 65–99)
GLUCOSE BLD-MCNC: 197 MG/DL (ref 65–99)
GLUCOSE BLD-MCNC: 202 MG/DL (ref 65–99)
GLUCOSE BLD-MCNC: 218 MG/DL (ref 65–99)
GLUCOSE SERPL-MCNC: 190 MG/DL (ref 65–99)
HCT VFR BLD AUTO: 31.4 % (ref 37–47)
HGB BLD-MCNC: 9.2 G/DL (ref 12–16)
MCH RBC QN AUTO: 22.8 PG (ref 27–33)
MCHC RBC AUTO-ENTMCNC: 29.3 G/DL (ref 33.6–35)
MCV RBC AUTO: 77.7 FL (ref 81.4–97.8)
PLATELET # BLD AUTO: 284 K/UL (ref 164–446)
PMV BLD AUTO: 9.8 FL (ref 9–12.9)
POTASSIUM SERPL-SCNC: 4.3 MMOL/L (ref 3.6–5.5)
PROT SERPL-MCNC: 6.9 G/DL (ref 6–8.2)
RBC # BLD AUTO: 4.04 M/UL (ref 4.2–5.4)
SODIUM SERPL-SCNC: 138 MMOL/L (ref 135–145)
WBC # BLD AUTO: 6.1 K/UL (ref 4.8–10.8)

## 2021-04-22 PROCEDURE — 85027 COMPLETE CBC AUTOMATED: CPT

## 2021-04-22 PROCEDURE — 99233 SBSQ HOSP IP/OBS HIGH 50: CPT | Performed by: HOSPITALIST

## 2021-04-22 PROCEDURE — 700102 HCHG RX REV CODE 250 W/ 637 OVERRIDE(OP): Performed by: STUDENT IN AN ORGANIZED HEALTH CARE EDUCATION/TRAINING PROGRAM

## 2021-04-22 PROCEDURE — 700105 HCHG RX REV CODE 258: Performed by: HOSPITALIST

## 2021-04-22 PROCEDURE — 82962 GLUCOSE BLOOD TEST: CPT | Mod: 91

## 2021-04-22 PROCEDURE — 94640 AIRWAY INHALATION TREATMENT: CPT

## 2021-04-22 PROCEDURE — 700102 HCHG RX REV CODE 250 W/ 637 OVERRIDE(OP): Performed by: HOSPITALIST

## 2021-04-22 PROCEDURE — A9270 NON-COVERED ITEM OR SERVICE: HCPCS | Performed by: STUDENT IN AN ORGANIZED HEALTH CARE EDUCATION/TRAINING PROGRAM

## 2021-04-22 PROCEDURE — 770006 HCHG ROOM/CARE - MED/SURG/GYN SEMI*

## 2021-04-22 PROCEDURE — A9270 NON-COVERED ITEM OR SERVICE: HCPCS | Performed by: HOSPITALIST

## 2021-04-22 PROCEDURE — 80053 COMPREHEN METABOLIC PANEL: CPT

## 2021-04-22 PROCEDURE — 36415 COLL VENOUS BLD VENIPUNCTURE: CPT

## 2021-04-22 PROCEDURE — 700111 HCHG RX REV CODE 636 W/ 250 OVERRIDE (IP): Performed by: STUDENT IN AN ORGANIZED HEALTH CARE EDUCATION/TRAINING PROGRAM

## 2021-04-22 RX ORDER — SODIUM CHLORIDE 9 MG/ML
INJECTION, SOLUTION INTRAVENOUS CONTINUOUS
Status: DISCONTINUED | OUTPATIENT
Start: 2021-04-22 | End: 2021-04-23

## 2021-04-22 RX ORDER — BENZONATATE 100 MG/1
100 CAPSULE ORAL 3 TIMES DAILY
Status: DISCONTINUED | OUTPATIENT
Start: 2021-04-22 | End: 2021-05-08 | Stop reason: HOSPADM

## 2021-04-22 RX ORDER — OXYCODONE HYDROCHLORIDE 5 MG/1
5 TABLET ORAL ONCE
Status: COMPLETED | OUTPATIENT
Start: 2021-04-22 | End: 2021-04-22

## 2021-04-22 RX ADMIN — INSULIN LISPRO 2 UNITS: 100 INJECTION, SOLUTION INTRAVENOUS; SUBCUTANEOUS at 12:14

## 2021-04-22 RX ADMIN — ACETAMINOPHEN 1000 MG: 500 TABLET ORAL at 05:07

## 2021-04-22 RX ADMIN — ONDANSETRON 4 MG: 4 TABLET, ORALLY DISINTEGRATING ORAL at 15:57

## 2021-04-22 RX ADMIN — INSULIN LISPRO 1 UNITS: 100 INJECTION, SOLUTION INTRAVENOUS; SUBCUTANEOUS at 16:56

## 2021-04-22 RX ADMIN — BENZONATATE 100 MG: 100 CAPSULE ORAL at 15:57

## 2021-04-22 RX ADMIN — GUAIFENESIN AND DEXTROMETHORPHAN 10 ML: 100; 10 SYRUP ORAL at 00:47

## 2021-04-22 RX ADMIN — GUAIFENESIN AND DEXTROMETHORPHAN 10 ML: 100; 10 SYRUP ORAL at 10:36

## 2021-04-22 RX ADMIN — ENOXAPARIN SODIUM 40 MG: 40 INJECTION SUBCUTANEOUS at 05:07

## 2021-04-22 RX ADMIN — IBUPROFEN 600 MG: 600 TABLET ORAL at 10:39

## 2021-04-22 RX ADMIN — ACETAMINOPHEN 1000 MG: 500 TABLET ORAL at 16:59

## 2021-04-22 RX ADMIN — SODIUM CHLORIDE: 9 INJECTION, SOLUTION INTRAVENOUS at 13:00

## 2021-04-22 RX ADMIN — ACETAMINOPHEN 1000 MG: 500 TABLET ORAL at 12:14

## 2021-04-22 RX ADMIN — DOCUSATE SODIUM 50 MG AND SENNOSIDES 8.6 MG 2 TABLET: 8.6; 5 TABLET, FILM COATED ORAL at 21:27

## 2021-04-22 RX ADMIN — INSULIN LISPRO 2 UNITS: 100 INJECTION, SOLUTION INTRAVENOUS; SUBCUTANEOUS at 20:59

## 2021-04-22 RX ADMIN — INSULIN LISPRO 1 UNITS: 100 INJECTION, SOLUTION INTRAVENOUS; SUBCUTANEOUS at 08:05

## 2021-04-22 RX ADMIN — OXYCODONE 5 MG: 5 TABLET ORAL at 21:27

## 2021-04-22 RX ADMIN — DEXAMETHASONE 6 MG: 4 TABLET ORAL at 05:07

## 2021-04-22 RX ADMIN — IBUPROFEN 600 MG: 600 TABLET ORAL at 16:28

## 2021-04-22 RX ADMIN — BENZONATATE 100 MG: 100 CAPSULE ORAL at 17:00

## 2021-04-22 ASSESSMENT — COGNITIVE AND FUNCTIONAL STATUS - GENERAL
MOBILITY SCORE: 15
SUGGESTED CMS G CODE MODIFIER DAILY ACTIVITY: CK
MOVING FROM LYING ON BACK TO SITTING ON SIDE OF FLAT BED: A LITTLE
DRESSING REGULAR UPPER BODY CLOTHING: A LITTLE
SUGGESTED CMS G CODE MODIFIER MOBILITY: CK
STANDING UP FROM CHAIR USING ARMS: A LOT
TOILETING: A LOT
WALKING IN HOSPITAL ROOM: A LOT
DAILY ACTIVITIY SCORE: 19
CLIMB 3 TO 5 STEPS WITH RAILING: A LOT
HELP NEEDED FOR BATHING: A LITTLE
DRESSING REGULAR LOWER BODY CLOTHING: A LITTLE
MOVING TO AND FROM BED TO CHAIR: A LITTLE
TURNING FROM BACK TO SIDE WHILE IN FLAT BAD: A LITTLE

## 2021-04-22 ASSESSMENT — ENCOUNTER SYMPTOMS
NECK PAIN: 0
BACK PAIN: 0
NEUROLOGICAL NEGATIVE: 1
HEARTBURN: 0
COUGH: 1
SHORTNESS OF BREATH: 1
ABDOMINAL PAIN: 0
CLAUDICATION: 0
WEIGHT LOSS: 0
DOUBLE VISION: 0
NAUSEA: 0
MYALGIAS: 0
PHOTOPHOBIA: 0
FEVER: 0
VOMITING: 0
PSYCHIATRIC NEGATIVE: 1
CHILLS: 0
EYE PAIN: 0
BLURRED VISION: 0
PALPITATIONS: 0

## 2021-04-22 ASSESSMENT — PAIN DESCRIPTION - PAIN TYPE: TYPE: ACUTE PAIN

## 2021-04-22 NOTE — PROGRESS NOTES
Hospital Medicine Daily Progress Note    Date of Service  4/22/2021    Chief Complaint  50 y.o. female admitted 4/20/2021 with sob    Hospital Course  No notes on file    Interval Problem Update  Remains on HFNC    Sob persists    Her cough is really bothering her    She has noted dark urine of decreased amount    malaise    Afebrile      Consultants/Specialty  Id md    Code Status  Full Code    Disposition  pending    Review of Systems  Review of Systems   Constitutional: Positive for malaise/fatigue. Negative for chills, fever and weight loss.   HENT: Negative for ear pain, hearing loss and tinnitus.    Eyes: Negative for blurred vision, double vision, photophobia and pain.   Respiratory: Positive for cough and shortness of breath.    Cardiovascular: Negative for chest pain, palpitations and claudication.   Gastrointestinal: Negative for abdominal pain, heartburn, nausea and vomiting.   Genitourinary: Negative for dysuria, frequency, hematuria and urgency.   Musculoskeletal: Negative for back pain, joint pain, myalgias and neck pain.   Neurological: Negative.    Psychiatric/Behavioral: Negative.    All other systems reviewed and are negative.       Physical Exam  Temp:  [35.8 °C (96.5 °F)-36.2 °C (97.2 °F)] 35.9 °C (96.6 °F)  Pulse:  [67-88] 71  Resp:  [16-22] 20  BP: (101-126)/(50-67) 116/67  SpO2:  [89 %-96 %] 93 %    Physical Exam  Vitals reviewed.   Constitutional:       General: She is not in acute distress.     Appearance: She is ill-appearing. She is not toxic-appearing or diaphoretic.   HENT:      Head: Normocephalic and atraumatic.      Nose: Nose normal.      Mouth/Throat:      Mouth: Mucous membranes are moist.   Eyes:      General: No scleral icterus.        Left eye: No discharge.      Extraocular Movements: Extraocular movements intact.      Pupils: Pupils are equal, round, and reactive to light.   Cardiovascular:      Rate and Rhythm: Normal rate and regular rhythm.      Pulses: Normal pulses.       Heart sounds: No gallop.    Pulmonary:      Comments: Decrease bs at bases  Abdominal:      General: There is distension.      Palpations: There is no mass.      Tenderness: There is no right CVA tenderness, left CVA tenderness or guarding.      Hernia: No hernia is present.   Musculoskeletal:         General: No swelling, tenderness or deformity. Normal range of motion.      Cervical back: Normal range of motion and neck supple. No rigidity.      Right lower leg: Edema present.      Left lower leg: Edema present.   Lymphadenopathy:      Cervical: No cervical adenopathy.   Skin:     General: Skin is warm.      Capillary Refill: Capillary refill takes 2 to 3 seconds.      Coloration: Skin is not jaundiced or pale.      Findings: No bruising, erythema, lesion or rash.   Neurological:      General: No focal deficit present.      Mental Status: She is alert and oriented to person, place, and time.      Cranial Nerves: No cranial nerve deficit.      Motor: No weakness.      Gait: Gait normal.   Psychiatric:         Mood and Affect: Mood normal.         Fluids    Intake/Output Summary (Last 24 hours) at 4/22/2021 1426  Last data filed at 4/21/2021 2000  Gross per 24 hour   Intake 240 ml   Output --   Net 240 ml       Laboratory  Recent Labs     04/20/21  1230 04/21/21  0225 04/22/21  0440   WBC 7.9 9.9 6.1   RBC 4.36 4.39 4.04*   HEMOGLOBIN 10.1* 9.9* 9.2*   HEMATOCRIT 33.9* 34.4* 31.4*   MCV 77.8* 78.4* 77.7*   MCH 23.2* 22.6* 22.8*   MCHC 29.8* 28.8* 29.3*   RDW 52.3* 52.5* 52.5*   PLATELETCT 258 285 284   MPV 9.6 10.3 9.8     Recent Labs     04/20/21  1230 04/21/21  0225 04/22/21  0440   SODIUM 139 137 138   POTASSIUM 3.4* 3.9 4.3   CHLORIDE 102 104 105   CO2 26 27 27   GLUCOSE 126* 169* 190*   BUN 14 14 17   CREATININE 0.55 0.48* 0.32*   CALCIUM 8.8 9.3 9.0             Recent Labs     04/21/21 0225   TRIGLYCERIDE 74   HDL 36*   LDL 55       Imaging  EC-ECHOCARDIOGRAM COMPLETE W/O CONT   Final Result       DX-CHEST-PORTABLE (1 VIEW)   Final Result      New moderate to severe multifocal consolidation is compatible with Covid pneumonia favored over bacterial pneumonia           Assessment/Plan  * Acute respiratory failure with hypoxia (HCC)- (present on admission)  Assessment & Plan    Chronic history of bilateral extremity edema without CHF and on Lasix at home        I's and O's  Daily weights  Echocardiogram results noted  Titrate oxygen as tolerable      Pneumonia due to COVID-19 virus- (present on admission)  Assessment & Plan    Complicated by acute hypoxic respiratory failure    Cont Decadron     procalcitonin negative    Robitussin and tessalon    Titrate oxygen as tolerable  Labs on a.m.    Decreased urine output  Assessment & Plan  Encourage po fluid intake    Saline ordered for 500cc x 1    Morbidly obese (HCC)- (present on admission)  Assessment & Plan  Contributing to diabetes acute hypoxic respiratory failure  Discussed diet and exercise prior to discharge    Lower extremity edema- (present on admission)  Assessment & Plan  Elevate legs    Pulmonary infiltrates- (present on admission)  Assessment & Plan  Noted on Chest x-ray  Suggestive of covid      RT/O2  Titrate O2    Type 2 diabetes mellitus (HCC)- (present on admission)  Assessment & Plan  Hold home Metformin  Insulin sliding scale   Accu-Cheks         VTE prophylaxis: lovenox    Check am cbc, bmp

## 2021-04-22 NOTE — PROGRESS NOTES
Assumed care at 0645. Patient in bed, currently on HHF NC 50L at 100% with SpO2 of 93%. Patient denies chest pain. No new issues.

## 2021-04-23 LAB
ALBUMIN SERPL BCP-MCNC: 3.3 G/DL (ref 3.2–4.9)
ALBUMIN/GLOB SERPL: 0.9 G/DL
ALP SERPL-CCNC: 144 U/L (ref 30–99)
ALT SERPL-CCNC: 35 U/L (ref 2–50)
ANION GAP SERPL CALC-SCNC: 5 MMOL/L (ref 7–16)
AST SERPL-CCNC: 55 U/L (ref 12–45)
BILIRUB SERPL-MCNC: 0.3 MG/DL (ref 0.1–1.5)
BUN SERPL-MCNC: 19 MG/DL (ref 8–22)
CALCIUM SERPL-MCNC: 9.1 MG/DL (ref 8.5–10.5)
CHLORIDE SERPL-SCNC: 105 MMOL/L (ref 96–112)
CO2 SERPL-SCNC: 29 MMOL/L (ref 20–33)
CREAT SERPL-MCNC: 0.44 MG/DL (ref 0.5–1.4)
ERYTHROCYTE [DISTWIDTH] IN BLOOD BY AUTOMATED COUNT: 52.7 FL (ref 35.9–50)
GAMMA INTERFERON BACKGROUND BLD IA-ACNC: 0.04 IU/ML
GLOBULIN SER CALC-MCNC: 3.6 G/DL (ref 1.9–3.5)
GLUCOSE BLD-MCNC: 143 MG/DL (ref 65–99)
GLUCOSE BLD-MCNC: 190 MG/DL (ref 65–99)
GLUCOSE BLD-MCNC: 205 MG/DL (ref 65–99)
GLUCOSE BLD-MCNC: 212 MG/DL (ref 65–99)
GLUCOSE BLD-MCNC: 215 MG/DL (ref 65–99)
GLUCOSE SERPL-MCNC: 146 MG/DL (ref 65–99)
HCT VFR BLD AUTO: 32.4 % (ref 37–47)
HGB BLD-MCNC: 9.1 G/DL (ref 12–16)
M TB IFN-G BLD-IMP: NEGATIVE
M TB IFN-G CD4+ BCKGRND COR BLD-ACNC: 0.01 IU/ML
MCH RBC QN AUTO: 22.4 PG (ref 27–33)
MCHC RBC AUTO-ENTMCNC: 28.1 G/DL (ref 33.6–35)
MCV RBC AUTO: 79.8 FL (ref 81.4–97.8)
MITOGEN IGNF BCKGRD COR BLD-ACNC: 8.19 IU/ML
PLATELET # BLD AUTO: 278 K/UL (ref 164–446)
PMV BLD AUTO: 9.9 FL (ref 9–12.9)
POTASSIUM SERPL-SCNC: 4.6 MMOL/L (ref 3.6–5.5)
PROT SERPL-MCNC: 6.9 G/DL (ref 6–8.2)
QFT TB2 - NIL TBQ2: 0 IU/ML
RBC # BLD AUTO: 4.06 M/UL (ref 4.2–5.4)
SODIUM SERPL-SCNC: 139 MMOL/L (ref 135–145)
WBC # BLD AUTO: 6.8 K/UL (ref 4.8–10.8)

## 2021-04-23 PROCEDURE — 700111 HCHG RX REV CODE 636 W/ 250 OVERRIDE (IP): Performed by: STUDENT IN AN ORGANIZED HEALTH CARE EDUCATION/TRAINING PROGRAM

## 2021-04-23 PROCEDURE — 80053 COMPREHEN METABOLIC PANEL: CPT

## 2021-04-23 PROCEDURE — 94760 N-INVAS EAR/PLS OXIMETRY 1: CPT

## 2021-04-23 PROCEDURE — 85027 COMPLETE CBC AUTOMATED: CPT

## 2021-04-23 PROCEDURE — 82962 GLUCOSE BLOOD TEST: CPT | Mod: 91

## 2021-04-23 PROCEDURE — 700102 HCHG RX REV CODE 250 W/ 637 OVERRIDE(OP): Performed by: HOSPITALIST

## 2021-04-23 PROCEDURE — 700105 HCHG RX REV CODE 258: Performed by: HOSPITALIST

## 2021-04-23 PROCEDURE — A9270 NON-COVERED ITEM OR SERVICE: HCPCS | Performed by: STUDENT IN AN ORGANIZED HEALTH CARE EDUCATION/TRAINING PROGRAM

## 2021-04-23 PROCEDURE — 99233 SBSQ HOSP IP/OBS HIGH 50: CPT | Performed by: HOSPITALIST

## 2021-04-23 PROCEDURE — 36415 COLL VENOUS BLD VENIPUNCTURE: CPT

## 2021-04-23 PROCEDURE — A9270 NON-COVERED ITEM OR SERVICE: HCPCS | Performed by: HOSPITALIST

## 2021-04-23 PROCEDURE — 94640 AIRWAY INHALATION TREATMENT: CPT

## 2021-04-23 PROCEDURE — 700102 HCHG RX REV CODE 250 W/ 637 OVERRIDE(OP): Performed by: STUDENT IN AN ORGANIZED HEALTH CARE EDUCATION/TRAINING PROGRAM

## 2021-04-23 PROCEDURE — 770006 HCHG ROOM/CARE - MED/SURG/GYN SEMI*

## 2021-04-23 RX ADMIN — BENZONATATE 100 MG: 100 CAPSULE ORAL at 17:11

## 2021-04-23 RX ADMIN — ACETAMINOPHEN 1000 MG: 500 TABLET ORAL at 11:55

## 2021-04-23 RX ADMIN — ACETAMINOPHEN 1000 MG: 500 TABLET ORAL at 17:10

## 2021-04-23 RX ADMIN — GUAIFENESIN AND DEXTROMETHORPHAN 10 ML: 100; 10 SYRUP ORAL at 20:50

## 2021-04-23 RX ADMIN — DOCUSATE SODIUM 50 MG AND SENNOSIDES 8.6 MG 2 TABLET: 8.6; 5 TABLET, FILM COATED ORAL at 04:53

## 2021-04-23 RX ADMIN — SODIUM CHLORIDE: 9 INJECTION, SOLUTION INTRAVENOUS at 02:33

## 2021-04-23 RX ADMIN — INSULIN LISPRO 2 UNITS: 100 INJECTION, SOLUTION INTRAVENOUS; SUBCUTANEOUS at 20:50

## 2021-04-23 RX ADMIN — DEXAMETHASONE 6 MG: 4 TABLET ORAL at 04:53

## 2021-04-23 RX ADMIN — BENZONATATE 100 MG: 100 CAPSULE ORAL at 11:55

## 2021-04-23 RX ADMIN — INSULIN LISPRO 2 UNITS: 100 INJECTION, SOLUTION INTRAVENOUS; SUBCUTANEOUS at 17:10

## 2021-04-23 RX ADMIN — BENZONATATE 100 MG: 100 CAPSULE ORAL at 04:53

## 2021-04-23 RX ADMIN — GUAIFENESIN AND DEXTROMETHORPHAN 10 ML: 100; 10 SYRUP ORAL at 00:27

## 2021-04-23 RX ADMIN — ACETAMINOPHEN 1000 MG: 500 TABLET ORAL at 04:53

## 2021-04-23 RX ADMIN — ENOXAPARIN SODIUM 40 MG: 40 INJECTION SUBCUTANEOUS at 04:53

## 2021-04-23 RX ADMIN — INSULIN LISPRO 2 UNITS: 100 INJECTION, SOLUTION INTRAVENOUS; SUBCUTANEOUS at 11:57

## 2021-04-23 ASSESSMENT — ENCOUNTER SYMPTOMS
PSYCHIATRIC NEGATIVE: 1
NAUSEA: 0
WEIGHT LOSS: 0
BACK PAIN: 0
ABDOMINAL PAIN: 0
DOUBLE VISION: 0
NEUROLOGICAL NEGATIVE: 1
HEARTBURN: 0
CHILLS: 0
CLAUDICATION: 0
SHORTNESS OF BREATH: 1
COUGH: 1
FEVER: 0
PHOTOPHOBIA: 0
VOMITING: 0
NECK PAIN: 0
MYALGIAS: 0
BLURRED VISION: 0
PALPITATIONS: 0
EYE PAIN: 0

## 2021-04-23 ASSESSMENT — LIFESTYLE VARIABLES
HOW MANY TIMES IN THE PAST YEAR HAVE YOU HAD 5 OR MORE DRINKS IN A DAY: 0
AVERAGE NUMBER OF DAYS PER WEEK YOU HAVE A DRINK CONTAINING ALCOHOL: 0
EVER FELT BAD OR GUILTY ABOUT YOUR DRINKING: NO
DOES PATIENT WANT TO STOP DRINKING: NO
CONSUMPTION TOTAL: NEGATIVE
TOTAL SCORE: 0
ON A TYPICAL DAY WHEN YOU DRINK ALCOHOL HOW MANY DRINKS DO YOU HAVE: 0
HAVE PEOPLE ANNOYED YOU BY CRITICIZING YOUR DRINKING: NO
HAVE YOU EVER FELT YOU SHOULD CUT DOWN ON YOUR DRINKING: NO
ALCOHOL_USE: NO
TOTAL SCORE: 0
TOTAL SCORE: 0
EVER HAD A DRINK FIRST THING IN THE MORNING TO STEADY YOUR NERVES TO GET RID OF A HANGOVER: NO

## 2021-04-23 ASSESSMENT — PAIN DESCRIPTION - PAIN TYPE
TYPE: ACUTE PAIN
TYPE: ACUTE PAIN

## 2021-04-23 ASSESSMENT — FIBROSIS 4 INDEX: FIB4 SCORE: 1.74

## 2021-04-23 NOTE — PROGRESS NOTES
Received evening report from day RN. Pt is asking for pain meds d/t headache, current meds APAP and IBU are maxed out and contact made to MD about info and another order for stronger meds. Bed is locked in low position with call light and belongings within reach. POC discussed and all questions answered. All needs and requirements met at this time.

## 2021-04-23 NOTE — CARE PLAN
Problem: Communication  Goal: The ability to communicate needs accurately and effectively will improve  Outcome: PROGRESSING AS EXPECTED     Problem: Safety  Goal: Will remain free from injury  Outcome: PROGRESSING AS EXPECTED     Problem: Pain Management  Goal: Pain level will decrease to patient's comfort goal  Outcome: PROGRESSING SLOWER THAN EXPECTED    Pt complaints of pain not being managed by current medication, update given to MD and new order for pain meds given.

## 2021-04-23 NOTE — CARE PLAN
Problem: Safety  Goal: Will remain free from injury  Outcome: PROGRESSING AS EXPECTED  Patient using call bell to alert staff to immediate needs.    Problem: Respiratory:  Goal: Respiratory status will improve  Outcome: PROGRESSING AS EXPECTED   Patient encouraged to use IS doing 10 breaths/hour.

## 2021-04-23 NOTE — PROGRESS NOTES
Isolation Precautions:    Patient is on droplet isolation for Covid 19.    Patient educated on reason for isolation, how the infection may be transmitted, and how to help prevent transmission to others.     Patient educated that droplet precautions involves staff and visitors wearing PPE, follow  Standard Precautions and perform meticulous hand hygiene in order to prevent transmission of infection. (Contact Precautions: gown and gloves; Special Contact Precautions: gown and gloves, with the added requirement of soap and water for hand hygiene; Droplet Precautions: surgical mask worn by staff and visitors in the room, and worn by the patient when out of the room; Airborne Precautions: involves staff wearing PPE to include an N95 Respirator or Controlled Air Purifying Respirator (CAPR).  Visitors should be limited and may wear an N95 mask).         Patient transport and mobilization on unit. Patient educated that they may leave their room, but prior to exiting, the patient needs to have on a fresh patient gown, ensure the potentially infectious area is covered, perform appropriate hand hygiene immediately prior to exiting the room. (*For Airborne Precautions: Patient educated that time out of the room should be minimized and limited to transport to diagnostic procedures or other activities. Patient is to wear surgical mask when required to leave the patient room).

## 2021-04-23 NOTE — PROGRESS NOTES
Hospital Medicine Daily Progress Note    Date of Service  4/23/2021    Chief Complaint  50 y.o. female admitted 4/20/2021 with sob    Hospital Course  No notes on file    Interval Problem Update  Remains on HFNC 70% at 40 liters    Her sob is about the same    malaise    Afebrile      Consultants/Specialty  Id md    Code Status  Full Code    Disposition  pending    Review of Systems  Review of Systems   Constitutional: Positive for malaise/fatigue. Negative for chills, fever and weight loss.   HENT: Negative for ear pain, hearing loss and tinnitus.    Eyes: Negative for blurred vision, double vision, photophobia and pain.   Respiratory: Positive for cough and shortness of breath.    Cardiovascular: Negative for chest pain, palpitations and claudication.   Gastrointestinal: Negative for abdominal pain, heartburn, nausea and vomiting.   Genitourinary: Negative for dysuria, frequency, hematuria and urgency.   Musculoskeletal: Negative for back pain, joint pain, myalgias and neck pain.   Neurological: Negative.    Psychiatric/Behavioral: Negative.    All other systems reviewed and are negative.       Physical Exam  Temp:  [35.9 °C (96.6 °F)-36.3 °C (97.3 °F)] 35.9 °C (96.6 °F)  Pulse:  [66-90] 70  Resp:  [18-20] 18  BP: (112-134)/(71-84) 133/84  SpO2:  [89 %-94 %] 94 %    Physical Exam  Vitals reviewed.   Constitutional:       General: She is not in acute distress.     Appearance: She is ill-appearing. She is not toxic-appearing or diaphoretic.   HENT:      Head: Normocephalic and atraumatic.      Nose: Nose normal.      Mouth/Throat:      Mouth: Mucous membranes are moist.   Eyes:      General: No scleral icterus.        Left eye: No discharge.      Extraocular Movements: Extraocular movements intact.      Pupils: Pupils are equal, round, and reactive to light.   Cardiovascular:      Rate and Rhythm: Normal rate and regular rhythm.      Pulses: Normal pulses.      Heart sounds: No gallop.    Pulmonary:      Comments:  Decrease bs at bases  Abdominal:      General: There is distension.      Palpations: There is no mass.      Tenderness: There is no right CVA tenderness, left CVA tenderness or guarding.      Hernia: No hernia is present.   Musculoskeletal:         General: No swelling, tenderness or deformity. Normal range of motion.      Cervical back: Normal range of motion and neck supple. No rigidity.      Right lower leg: Edema present.      Left lower leg: Edema present.   Lymphadenopathy:      Cervical: No cervical adenopathy.   Skin:     General: Skin is warm.      Capillary Refill: Capillary refill takes 2 to 3 seconds.      Coloration: Skin is not jaundiced or pale.      Findings: No bruising, erythema, lesion or rash.   Neurological:      General: No focal deficit present.      Mental Status: She is alert and oriented to person, place, and time.      Cranial Nerves: No cranial nerve deficit.      Motor: No weakness.      Gait: Gait normal.   Psychiatric:         Mood and Affect: Mood normal.         Fluids    Intake/Output Summary (Last 24 hours) at 4/23/2021 1413  Last data filed at 4/23/2021 0400  Gross per 24 hour   Intake 100 ml   Output 450 ml   Net -350 ml       Laboratory  Recent Labs     04/21/21 0225 04/22/21 0440 04/23/21  0433   WBC 9.9 6.1 6.8   RBC 4.39 4.04* 4.06*   HEMOGLOBIN 9.9* 9.2* 9.1*   HEMATOCRIT 34.4* 31.4* 32.4*   MCV 78.4* 77.7* 79.8*   MCH 22.6* 22.8* 22.4*   MCHC 28.8* 29.3* 28.1*   RDW 52.5* 52.5* 52.7*   PLATELETCT 285 284 278   MPV 10.3 9.8 9.9     Recent Labs     04/21/21 0225 04/22/21 0440 04/23/21  0433   SODIUM 137 138 139   POTASSIUM 3.9 4.3 4.6   CHLORIDE 104 105 105   CO2 27 27 29   GLUCOSE 169* 190* 146*   BUN 14 17 19   CREATININE 0.48* 0.32* 0.44*   CALCIUM 9.3 9.0 9.1             Recent Labs     04/21/21 0225   TRIGLYCERIDE 74   HDL 36*   LDL 55       Imaging  EC-ECHOCARDIOGRAM COMPLETE W/O CONT   Final Result      DX-CHEST-PORTABLE (1 VIEW)   Final Result      New moderate  to severe multifocal consolidation is compatible with Covid pneumonia favored over bacterial pneumonia           Assessment/Plan  * Acute respiratory failure with hypoxia (HCC)- (present on admission)  Assessment & Plan    Chronic history of bilateral extremity edema without CHF and on Lasix at home        I's and O's  Daily weights  Echocardiogram results noted  Titrate oxygen as tolerable      Pneumonia due to COVID-19 virus- (present on admission)  Assessment & Plan    Complicated by acute hypoxic respiratory failure    Cont Decadron     procalcitonin negative    Robitussin and tessalon    Titrate oxygen as tolerable  Labs on a.m.    Decreased urine output  Assessment & Plan  Encourage po fluid intake        Morbidly obese (HCC)- (present on admission)  Assessment & Plan  Contributing to diabetes acute hypoxic respiratory failure  Discussed diet and exercise prior to discharge    Lower extremity edema- (present on admission)  Assessment & Plan  Elevate legs    Pulmonary infiltrates- (present on admission)  Assessment & Plan  Noted on Chest x-ray  Suggestive of covid      RT/O2  Titrate O2    Type 2 diabetes mellitus (HCC)- (present on admission)  Assessment & Plan  Hold home Metformin  Insulin sliding scale   Accu-Cheks         VTE prophylaxis: lovenox    Check am cbc, bmp

## 2021-04-24 LAB
ALBUMIN SERPL BCP-MCNC: 3.4 G/DL (ref 3.2–4.9)
ALBUMIN/GLOB SERPL: 0.9 G/DL
ALP SERPL-CCNC: 159 U/L (ref 30–99)
ALT SERPL-CCNC: 44 U/L (ref 2–50)
ANION GAP SERPL CALC-SCNC: 8 MMOL/L (ref 7–16)
AST SERPL-CCNC: 50 U/L (ref 12–45)
BILIRUB SERPL-MCNC: 0.3 MG/DL (ref 0.1–1.5)
BUN SERPL-MCNC: 14 MG/DL (ref 8–22)
CALCIUM SERPL-MCNC: 9.1 MG/DL (ref 8.5–10.5)
CHLORIDE SERPL-SCNC: 104 MMOL/L (ref 96–112)
CO2 SERPL-SCNC: 27 MMOL/L (ref 20–33)
CREAT SERPL-MCNC: 0.36 MG/DL (ref 0.5–1.4)
ERYTHROCYTE [DISTWIDTH] IN BLOOD BY AUTOMATED COUNT: 52.5 FL (ref 35.9–50)
GLOBULIN SER CALC-MCNC: 3.6 G/DL (ref 1.9–3.5)
GLUCOSE BLD-MCNC: 117 MG/DL (ref 65–99)
GLUCOSE BLD-MCNC: 157 MG/DL (ref 65–99)
GLUCOSE BLD-MCNC: 173 MG/DL (ref 65–99)
GLUCOSE BLD-MCNC: 252 MG/DL (ref 65–99)
GLUCOSE SERPL-MCNC: 135 MG/DL (ref 65–99)
HCT VFR BLD AUTO: 34 % (ref 37–47)
HGB BLD-MCNC: 9.8 G/DL (ref 12–16)
MCH RBC QN AUTO: 22.7 PG (ref 27–33)
MCHC RBC AUTO-ENTMCNC: 28.8 G/DL (ref 33.6–35)
MCV RBC AUTO: 78.9 FL (ref 81.4–97.8)
PLATELET # BLD AUTO: 297 K/UL (ref 164–446)
PMV BLD AUTO: 9.7 FL (ref 9–12.9)
POTASSIUM SERPL-SCNC: 4.6 MMOL/L (ref 3.6–5.5)
PROT SERPL-MCNC: 7 G/DL (ref 6–8.2)
RBC # BLD AUTO: 4.31 M/UL (ref 4.2–5.4)
SODIUM SERPL-SCNC: 139 MMOL/L (ref 135–145)
WBC # BLD AUTO: 7.6 K/UL (ref 4.8–10.8)

## 2021-04-24 PROCEDURE — 94760 N-INVAS EAR/PLS OXIMETRY 1: CPT

## 2021-04-24 PROCEDURE — 99233 SBSQ HOSP IP/OBS HIGH 50: CPT | Performed by: HOSPITALIST

## 2021-04-24 PROCEDURE — 82962 GLUCOSE BLOOD TEST: CPT | Mod: 91

## 2021-04-24 PROCEDURE — 770006 HCHG ROOM/CARE - MED/SURG/GYN SEMI*

## 2021-04-24 PROCEDURE — 36415 COLL VENOUS BLD VENIPUNCTURE: CPT

## 2021-04-24 PROCEDURE — 700102 HCHG RX REV CODE 250 W/ 637 OVERRIDE(OP): Performed by: HOSPITALIST

## 2021-04-24 PROCEDURE — 80053 COMPREHEN METABOLIC PANEL: CPT

## 2021-04-24 PROCEDURE — A9270 NON-COVERED ITEM OR SERVICE: HCPCS | Performed by: HOSPITALIST

## 2021-04-24 PROCEDURE — 94640 AIRWAY INHALATION TREATMENT: CPT

## 2021-04-24 PROCEDURE — 700102 HCHG RX REV CODE 250 W/ 637 OVERRIDE(OP): Performed by: STUDENT IN AN ORGANIZED HEALTH CARE EDUCATION/TRAINING PROGRAM

## 2021-04-24 PROCEDURE — 700111 HCHG RX REV CODE 636 W/ 250 OVERRIDE (IP): Performed by: STUDENT IN AN ORGANIZED HEALTH CARE EDUCATION/TRAINING PROGRAM

## 2021-04-24 PROCEDURE — 85027 COMPLETE CBC AUTOMATED: CPT

## 2021-04-24 PROCEDURE — A9270 NON-COVERED ITEM OR SERVICE: HCPCS | Performed by: STUDENT IN AN ORGANIZED HEALTH CARE EDUCATION/TRAINING PROGRAM

## 2021-04-24 RX ADMIN — INSULIN LISPRO 3 UNITS: 100 INJECTION, SOLUTION INTRAVENOUS; SUBCUTANEOUS at 11:29

## 2021-04-24 RX ADMIN — INSULIN LISPRO 1 UNITS: 100 INJECTION, SOLUTION INTRAVENOUS; SUBCUTANEOUS at 06:38

## 2021-04-24 RX ADMIN — BENZONATATE 100 MG: 100 CAPSULE ORAL at 03:11

## 2021-04-24 RX ADMIN — ACETAMINOPHEN 1000 MG: 500 TABLET ORAL at 11:24

## 2021-04-24 RX ADMIN — BENZONATATE 100 MG: 100 CAPSULE ORAL at 11:24

## 2021-04-24 RX ADMIN — ACETAMINOPHEN 1000 MG: 500 TABLET ORAL at 16:56

## 2021-04-24 RX ADMIN — DEXAMETHASONE 6 MG: 4 TABLET ORAL at 03:11

## 2021-04-24 RX ADMIN — INSULIN LISPRO 1 UNITS: 100 INJECTION, SOLUTION INTRAVENOUS; SUBCUTANEOUS at 16:59

## 2021-04-24 RX ADMIN — ACETAMINOPHEN 1000 MG: 500 TABLET ORAL at 03:11

## 2021-04-24 RX ADMIN — ENOXAPARIN SODIUM 40 MG: 40 INJECTION SUBCUTANEOUS at 03:11

## 2021-04-24 RX ADMIN — BENZONATATE 100 MG: 100 CAPSULE ORAL at 16:56

## 2021-04-24 ASSESSMENT — ENCOUNTER SYMPTOMS
ABDOMINAL PAIN: 0
SHORTNESS OF BREATH: 1
BACK PAIN: 0
BLURRED VISION: 0
MYALGIAS: 0
NEUROLOGICAL NEGATIVE: 1
PSYCHIATRIC NEGATIVE: 1
NECK PAIN: 0
COUGH: 1
CLAUDICATION: 0
PALPITATIONS: 0
NAUSEA: 0
WEIGHT LOSS: 0
VOMITING: 0
HEARTBURN: 0
CHILLS: 0
EYE PAIN: 0
DOUBLE VISION: 0
FEVER: 0
PHOTOPHOBIA: 0

## 2021-04-24 ASSESSMENT — PAIN DESCRIPTION - PAIN TYPE: TYPE: ACUTE PAIN

## 2021-04-24 NOTE — CARE PLAN
Problem: Pain Management  Goal: Pain level will decrease to patient's comfort goal  Outcome: PROGRESSING AS EXPECTED   C/o headache - well controlled with tylenol    Problem: Urinary Elimination:  Goal: Ability to reestablish a normal urinary elimination pattern will improve  Outcome: PROGRESSING AS EXPECTED     Problem: Respiratory:  Goal: Respiratory status will improve  Outcome: PROGRESSING SLOWER THAN EXPECTED  Requiring between 40L 70%- 40L 100% O2 though shift. Desats to high 70s low 80s with any movement, takes quite a while to recover. Using nrb intermittently to help gain breath in addition to hhf. Tachypneic and dyspneic through shift. Cough intermittent - dry, strong. Good effort with incentive spirometer. Monitoring closely.     Purewic in place 2/2 menses. Helpful with urine elimination since movement is so exhausting - educated patient on importance of getting up and moving - up to bsc x2 this shift.

## 2021-04-24 NOTE — PROGRESS NOTES
Hospital Medicine Daily Progress Note    Date of Service  4/24/2021    Chief Complaint  50 y.o. female admitted 4/20/2021 with sob    Hospital Course  No notes on file    Interval Problem Update  Remains on HFNC 80% at 40 liters    sob and  cough    malaise    Afebrile      Consultants/Specialty  Id md    Code Status  Full Code    Disposition  pending    Review of Systems  Review of Systems   Constitutional: Positive for malaise/fatigue. Negative for chills, fever and weight loss.   HENT: Negative for ear pain, hearing loss and tinnitus.    Eyes: Negative for blurred vision, double vision, photophobia and pain.   Respiratory: Positive for cough and shortness of breath.    Cardiovascular: Negative for chest pain, palpitations and claudication.   Gastrointestinal: Negative for abdominal pain, heartburn, nausea and vomiting.   Genitourinary: Negative for dysuria, frequency, hematuria and urgency.   Musculoskeletal: Negative for back pain, joint pain, myalgias and neck pain.   Neurological: Negative.    Psychiatric/Behavioral: Negative.    All other systems reviewed and are negative.       Physical Exam  Temp:  [35.8 °C (96.5 °F)-36.7 °C (98 °F)] 36.7 °C (98 °F)  Pulse:  [65-78] 65  Resp:  [16-26] 16  BP: (128-144)/(72-95) 128/72  SpO2:  [89 %-98 %] 98 %    Physical Exam  Vitals reviewed.   Constitutional:       General: She is not in acute distress.     Appearance: She is ill-appearing. She is not toxic-appearing or diaphoretic.   HENT:      Head: Normocephalic and atraumatic.      Nose: Nose normal.      Mouth/Throat:      Mouth: Mucous membranes are moist.   Eyes:      General: No scleral icterus.        Left eye: No discharge.      Extraocular Movements: Extraocular movements intact.      Pupils: Pupils are equal, round, and reactive to light.   Cardiovascular:      Rate and Rhythm: Normal rate and regular rhythm.      Pulses: Normal pulses.      Heart sounds: No gallop.    Pulmonary:      Effort: Respiratory  distress present.      Comments: Decrease bs at bases  Abdominal:      General: There is distension.      Palpations: There is no mass.      Tenderness: There is no right CVA tenderness, left CVA tenderness or guarding.      Hernia: No hernia is present.   Musculoskeletal:         General: No swelling, tenderness or deformity. Normal range of motion.      Cervical back: Normal range of motion and neck supple. No rigidity.      Right lower leg: Edema present.      Left lower leg: Edema present.   Lymphadenopathy:      Cervical: No cervical adenopathy.   Skin:     General: Skin is warm.      Capillary Refill: Capillary refill takes 2 to 3 seconds.      Coloration: Skin is not jaundiced or pale.      Findings: No bruising, erythema, lesion or rash.   Neurological:      General: No focal deficit present.      Mental Status: She is alert and oriented to person, place, and time.      Cranial Nerves: No cranial nerve deficit.      Motor: No weakness.      Gait: Gait normal.   Psychiatric:         Mood and Affect: Mood normal.         Fluids    Intake/Output Summary (Last 24 hours) at 4/24/2021 1218  Last data filed at 4/23/2021 2015  Gross per 24 hour   Intake --   Output 400 ml   Net -400 ml       Laboratory  Recent Labs     04/22/21  0440 04/23/21  0433 04/24/21  0046   WBC 6.1 6.8 7.6   RBC 4.04* 4.06* 4.31   HEMOGLOBIN 9.2* 9.1* 9.8*   HEMATOCRIT 31.4* 32.4* 34.0*   MCV 77.7* 79.8* 78.9*   MCH 22.8* 22.4* 22.7*   MCHC 29.3* 28.1* 28.8*   RDW 52.5* 52.7* 52.5*   PLATELETCT 284 278 297   MPV 9.8 9.9 9.7     Recent Labs     04/22/21  0440 04/23/21  0433 04/24/21  0046   SODIUM 138 139 139   POTASSIUM 4.3 4.6 4.6   CHLORIDE 105 105 104   CO2 27 29 27   GLUCOSE 190* 146* 135*   BUN 17 19 14   CREATININE 0.32* 0.44* 0.36*   CALCIUM 9.0 9.1 9.1                   Imaging  EC-ECHOCARDIOGRAM COMPLETE W/O CONT   Final Result      DX-CHEST-PORTABLE (1 VIEW)   Final Result      New moderate to severe multifocal consolidation is  compatible with Covid pneumonia favored over bacterial pneumonia           Assessment/Plan  * Acute respiratory failure with hypoxia (HCC)- (present on admission)  Assessment & Plan    Chronic history of bilateral extremity edema without CHF and on Lasix at home        I's and O's  Daily weights  Echocardiogram results noted  Titrate oxygen as tolerable      Pneumonia due to COVID-19 virus- (present on admission)  Assessment & Plan    Complicated by acute hypoxic respiratory failure    Cont Decadron     procalcitonin negative    Robitussin and tessalon    Titrate oxygen as tolerable  Labs on a.m.    Decreased urine output  Assessment & Plan  Encourage po fluid intake        Morbidly obese (HCC)- (present on admission)  Assessment & Plan  Contributing to diabetes acute hypoxic respiratory failure  Discussed diet and exercise prior to discharge    Lower extremity edema- (present on admission)  Assessment & Plan  Elevate legs    Pulmonary infiltrates- (present on admission)  Assessment & Plan  Noted on Chest x-ray  Suggestive of covid      RT/O2  Titrate O2    Type 2 diabetes mellitus (HCC)- (present on admission)  Assessment & Plan  Hold home Metformin  Insulin sliding scale   Accu-Cheks         VTE prophylaxis: lovenox    Check am cbc, bmp

## 2021-04-25 LAB
ALBUMIN SERPL BCP-MCNC: 3.2 G/DL (ref 3.2–4.9)
ALBUMIN/GLOB SERPL: 1 G/DL
ALP SERPL-CCNC: 165 U/L (ref 30–99)
ALT SERPL-CCNC: 33 U/L (ref 2–50)
ANION GAP SERPL CALC-SCNC: 7 MMOL/L (ref 7–16)
AST SERPL-CCNC: 37 U/L (ref 12–45)
BILIRUB SERPL-MCNC: 0.5 MG/DL (ref 0.1–1.5)
BUN SERPL-MCNC: 15 MG/DL (ref 8–22)
CALCIUM SERPL-MCNC: 8.6 MG/DL (ref 8.5–10.5)
CHLORIDE SERPL-SCNC: 103 MMOL/L (ref 96–112)
CO2 SERPL-SCNC: 29 MMOL/L (ref 20–33)
CREAT SERPL-MCNC: 0.4 MG/DL (ref 0.5–1.4)
ERYTHROCYTE [DISTWIDTH] IN BLOOD BY AUTOMATED COUNT: 50.2 FL (ref 35.9–50)
GLOBULIN SER CALC-MCNC: 3.1 G/DL (ref 1.9–3.5)
GLUCOSE BLD-MCNC: 117 MG/DL (ref 65–99)
GLUCOSE BLD-MCNC: 166 MG/DL (ref 65–99)
GLUCOSE BLD-MCNC: 205 MG/DL (ref 65–99)
GLUCOSE BLD-MCNC: 99 MG/DL (ref 65–99)
GLUCOSE SERPL-MCNC: 108 MG/DL (ref 65–99)
HCT VFR BLD AUTO: 33.3 % (ref 37–47)
HGB BLD-MCNC: 9.8 G/DL (ref 12–16)
MCH RBC QN AUTO: 22.7 PG (ref 27–33)
MCHC RBC AUTO-ENTMCNC: 29.4 G/DL (ref 33.6–35)
MCV RBC AUTO: 77.1 FL (ref 81.4–97.8)
PLATELET # BLD AUTO: 286 K/UL (ref 164–446)
PMV BLD AUTO: 9.5 FL (ref 9–12.9)
POTASSIUM SERPL-SCNC: 4.1 MMOL/L (ref 3.6–5.5)
PROT SERPL-MCNC: 6.3 G/DL (ref 6–8.2)
RBC # BLD AUTO: 4.32 M/UL (ref 4.2–5.4)
SODIUM SERPL-SCNC: 139 MMOL/L (ref 135–145)
WBC # BLD AUTO: 6.9 K/UL (ref 4.8–10.8)

## 2021-04-25 PROCEDURE — 700102 HCHG RX REV CODE 250 W/ 637 OVERRIDE(OP): Performed by: STUDENT IN AN ORGANIZED HEALTH CARE EDUCATION/TRAINING PROGRAM

## 2021-04-25 PROCEDURE — 94760 N-INVAS EAR/PLS OXIMETRY 1: CPT

## 2021-04-25 PROCEDURE — A9270 NON-COVERED ITEM OR SERVICE: HCPCS | Performed by: STUDENT IN AN ORGANIZED HEALTH CARE EDUCATION/TRAINING PROGRAM

## 2021-04-25 PROCEDURE — 36415 COLL VENOUS BLD VENIPUNCTURE: CPT

## 2021-04-25 PROCEDURE — 700102 HCHG RX REV CODE 250 W/ 637 OVERRIDE(OP): Performed by: HOSPITALIST

## 2021-04-25 PROCEDURE — 770006 HCHG ROOM/CARE - MED/SURG/GYN SEMI*

## 2021-04-25 PROCEDURE — 82962 GLUCOSE BLOOD TEST: CPT | Mod: 91

## 2021-04-25 PROCEDURE — 94640 AIRWAY INHALATION TREATMENT: CPT

## 2021-04-25 PROCEDURE — 700111 HCHG RX REV CODE 636 W/ 250 OVERRIDE (IP): Performed by: STUDENT IN AN ORGANIZED HEALTH CARE EDUCATION/TRAINING PROGRAM

## 2021-04-25 PROCEDURE — 700111 HCHG RX REV CODE 636 W/ 250 OVERRIDE (IP): Performed by: HOSPITALIST

## 2021-04-25 PROCEDURE — 99233 SBSQ HOSP IP/OBS HIGH 50: CPT | Performed by: HOSPITALIST

## 2021-04-25 PROCEDURE — A9270 NON-COVERED ITEM OR SERVICE: HCPCS | Performed by: HOSPITALIST

## 2021-04-25 PROCEDURE — 80053 COMPREHEN METABOLIC PANEL: CPT

## 2021-04-25 PROCEDURE — 85027 COMPLETE CBC AUTOMATED: CPT

## 2021-04-25 RX ORDER — POTASSIUM CHLORIDE 20 MEQ/1
20 TABLET, EXTENDED RELEASE ORAL DAILY
Status: DISCONTINUED | OUTPATIENT
Start: 2021-04-25 | End: 2021-05-04

## 2021-04-25 RX ORDER — FUROSEMIDE 10 MG/ML
40 INJECTION INTRAMUSCULAR; INTRAVENOUS
Status: DISCONTINUED | OUTPATIENT
Start: 2021-04-25 | End: 2021-04-27

## 2021-04-25 RX ADMIN — ACETAMINOPHEN 1000 MG: 500 TABLET ORAL at 17:09

## 2021-04-25 RX ADMIN — DOCUSATE SODIUM 50 MG AND SENNOSIDES 8.6 MG 2 TABLET: 8.6; 5 TABLET, FILM COATED ORAL at 05:46

## 2021-04-25 RX ADMIN — FUROSEMIDE 40 MG: 10 INJECTION, SOLUTION INTRAMUSCULAR; INTRAVENOUS at 12:37

## 2021-04-25 RX ADMIN — IBUPROFEN 600 MG: 600 TABLET ORAL at 21:34

## 2021-04-25 RX ADMIN — BENZONATATE 100 MG: 100 CAPSULE ORAL at 05:43

## 2021-04-25 RX ADMIN — BENZONATATE 100 MG: 100 CAPSULE ORAL at 11:55

## 2021-04-25 RX ADMIN — POTASSIUM CHLORIDE 20 MEQ: 1500 TABLET, EXTENDED RELEASE ORAL at 12:36

## 2021-04-25 RX ADMIN — INSULIN LISPRO 2 UNITS: 100 INJECTION, SOLUTION INTRAVENOUS; SUBCUTANEOUS at 11:57

## 2021-04-25 RX ADMIN — ENOXAPARIN SODIUM 40 MG: 40 INJECTION SUBCUTANEOUS at 05:43

## 2021-04-25 RX ADMIN — ACETAMINOPHEN 1000 MG: 500 TABLET ORAL at 05:42

## 2021-04-25 RX ADMIN — IBUPROFEN 600 MG: 600 TABLET ORAL at 01:12

## 2021-04-25 RX ADMIN — ACETAMINOPHEN 1000 MG: 500 TABLET ORAL at 11:55

## 2021-04-25 RX ADMIN — INSULIN LISPRO 1 UNITS: 100 INJECTION, SOLUTION INTRAVENOUS; SUBCUTANEOUS at 17:07

## 2021-04-25 RX ADMIN — BENZONATATE 100 MG: 100 CAPSULE ORAL at 17:09

## 2021-04-25 RX ADMIN — DEXAMETHASONE 6 MG: 4 TABLET ORAL at 05:42

## 2021-04-25 RX ADMIN — GUAIFENESIN AND DEXTROMETHORPHAN 10 ML: 100; 10 SYRUP ORAL at 01:13

## 2021-04-25 ASSESSMENT — ENCOUNTER SYMPTOMS
EYE PAIN: 0
FEVER: 0
BLURRED VISION: 0
ABDOMINAL PAIN: 0
NECK PAIN: 0
NEUROLOGICAL NEGATIVE: 1
NAUSEA: 0
DOUBLE VISION: 0
BACK PAIN: 0
CHILLS: 0
VOMITING: 0
PALPITATIONS: 0
PSYCHIATRIC NEGATIVE: 1
PHOTOPHOBIA: 0
MYALGIAS: 0
SHORTNESS OF BREATH: 1
WEIGHT LOSS: 0
COUGH: 1
HEARTBURN: 0
CLAUDICATION: 0

## 2021-04-25 ASSESSMENT — PAIN DESCRIPTION - PAIN TYPE
TYPE: ACUTE PAIN

## 2021-04-25 ASSESSMENT — FIBROSIS 4 INDEX: FIB4 SCORE: 1.32

## 2021-04-25 NOTE — PROGRESS NOTES
Hospital Medicine Daily Progress Note    Date of Service  4/25/2021    Chief Complaint  50 y.o. female admitted 4/20/2021 with sob    Hospital Course  No notes on file    Interval Problem Update  Remains on HFNC 80% at 40 liters    sob and  Cough-unchanged    Referred to LTAC    malaise    Afebrile      Consultants/Specialty  Id md    Code Status  Full Code    Disposition  pending    Review of Systems  Review of Systems   Constitutional: Positive for malaise/fatigue. Negative for chills, fever and weight loss.   HENT: Negative for ear pain, hearing loss and tinnitus.    Eyes: Negative for blurred vision, double vision, photophobia and pain.   Respiratory: Positive for cough and shortness of breath.    Cardiovascular: Negative for chest pain, palpitations and claudication.   Gastrointestinal: Negative for abdominal pain, heartburn, nausea and vomiting.   Genitourinary: Negative for dysuria, frequency, hematuria and urgency.   Musculoskeletal: Negative for back pain, joint pain, myalgias and neck pain.   Neurological: Negative.    Psychiatric/Behavioral: Negative.    All other systems reviewed and are negative.       Physical Exam  Temp:  [35.8 °C (96.5 °F)-36.3 °C (97.4 °F)] 36.3 °C (97.4 °F)  Pulse:  [62-82] 82  Resp:  [15-25] 25  BP: (107-137)/(56-81) 119/75  SpO2:  [91 %-95 %] 92 %    Physical Exam  Vitals reviewed.   Constitutional:       General: She is not in acute distress.     Appearance: She is ill-appearing. She is not toxic-appearing or diaphoretic.   HENT:      Head: Normocephalic and atraumatic.      Nose: Nose normal.      Mouth/Throat:      Mouth: Mucous membranes are moist.   Eyes:      General: No scleral icterus.        Left eye: No discharge.      Extraocular Movements: Extraocular movements intact.      Pupils: Pupils are equal, round, and reactive to light.   Cardiovascular:      Rate and Rhythm: Normal rate and regular rhythm.      Pulses: Normal pulses.      Heart sounds: No gallop.     Pulmonary:      Effort: Respiratory distress present.      Comments: Decrease bs at bases  Abdominal:      General: There is distension.      Palpations: There is no mass.      Tenderness: There is no right CVA tenderness, left CVA tenderness or guarding.      Hernia: No hernia is present.   Musculoskeletal:         General: No swelling, tenderness or deformity. Normal range of motion.      Cervical back: Normal range of motion and neck supple. No rigidity.      Right lower leg: Edema present.      Left lower leg: Edema present.   Lymphadenopathy:      Cervical: No cervical adenopathy.   Skin:     General: Skin is warm.      Capillary Refill: Capillary refill takes 2 to 3 seconds.      Coloration: Skin is not jaundiced or pale.      Findings: No bruising, erythema, lesion or rash.   Neurological:      General: No focal deficit present.      Mental Status: She is alert and oriented to person, place, and time.      Cranial Nerves: No cranial nerve deficit.      Motor: No weakness.      Gait: Gait normal.   Psychiatric:         Mood and Affect: Mood normal.         Fluids    Intake/Output Summary (Last 24 hours) at 4/25/2021 1154  Last data filed at 4/25/2021 0900  Gross per 24 hour   Intake 420 ml   Output 800 ml   Net -380 ml       Laboratory  Recent Labs     04/23/21  0433 04/24/21  0046 04/25/21  0608   WBC 6.8 7.6 6.9   RBC 4.06* 4.31 4.32   HEMOGLOBIN 9.1* 9.8* 9.8*   HEMATOCRIT 32.4* 34.0* 33.3*   MCV 79.8* 78.9* 77.1*   MCH 22.4* 22.7* 22.7*   MCHC 28.1* 28.8* 29.4*   RDW 52.7* 52.5* 50.2*   PLATELETCT 278 297 286   MPV 9.9 9.7 9.5     Recent Labs     04/23/21  0433 04/24/21  0046 04/25/21  0608   SODIUM 139 139 139   POTASSIUM 4.6 4.6 4.1   CHLORIDE 105 104 103   CO2 29 27 29   GLUCOSE 146* 135* 108*   BUN 19 14 15   CREATININE 0.44* 0.36* 0.40*   CALCIUM 9.1 9.1 8.6                   Imaging  EC-ECHOCARDIOGRAM COMPLETE W/O CONT   Final Result      DX-CHEST-PORTABLE (1 VIEW)   Final Result      New moderate  to severe multifocal consolidation is compatible with Covid pneumonia favored over bacterial pneumonia           Assessment/Plan  * Acute respiratory failure with hypoxia (HCC)- (present on admission)  Assessment & Plan    Chronic history of bilateral extremity edema without CHF and on Lasix at home        I's and O's  Daily weights  Echocardiogram results noted  Titrate oxygen as tolerable      Pneumonia due to COVID-19 virus- (present on admission)  Assessment & Plan    Complicated by acute hypoxic respiratory failure    Cont Decadron     procalcitonin negative    Pulmonary toilet with iv lasix    Robitussin and tessalon    Titrate oxygen as tolerable  Labs on a.m.    Decreased urine output  Assessment & Plan  Encourage po fluid intake        Morbidly obese (HCC)- (present on admission)  Assessment & Plan  Contributing to diabetes acute hypoxic respiratory failure  Discussed diet and exercise prior to discharge    Lower extremity edema- (present on admission)  Assessment & Plan  Elevate legs    Pulmonary infiltrates- (present on admission)  Assessment & Plan  Noted on Chest x-ray  Suggestive of covid      RT/O2  Titrate O2    Type 2 diabetes mellitus (HCC)- (present on admission)  Assessment & Plan  Hold home Metformin  Insulin sliding scale   Accu-Cheks         VTE prophylaxis: lovenox    Check am cbc, bmp

## 2021-04-25 NOTE — CARE PLAN
Problem: Pain Management  Goal: Pain level will decrease to patient's comfort goal  Outcome: PROGRESSING AS EXPECTED   Tylenol and Motrin for headache.     Problem: Respiratory:  Goal: Respiratory status will improve  Outcome: PROGRESSING AS EXPECTED  Between 40L 80% and 40L 100% on HHF through shift. Decompensates down to 70s with ambulation. Takes ~15minutes to recover. Prn roboutssin x1 for cough.    Problem: Urinary Elimination:  Goal: Ability to reestablish a normal urinary elimination pattern will improve  Outcome: PROGRESSING AS EXPECTED   Mixed continence and frequency. Purewick in place.     Noted ble edema and slight redness? Possibly positional - denies any pain in legs.

## 2021-04-25 NOTE — RESPIRATORY CARE
Respiratory Update    Treatment modality: HHFO 40 LPM/80%    Frequency:Q4    Pt tolerating current treatments well with no adverse reactions.

## 2021-04-26 LAB
ANION GAP SERPL CALC-SCNC: 9 MMOL/L (ref 7–16)
BUN SERPL-MCNC: 17 MG/DL (ref 8–22)
CALCIUM SERPL-MCNC: 8.7 MG/DL (ref 8.5–10.5)
CHLORIDE SERPL-SCNC: 102 MMOL/L (ref 96–112)
CO2 SERPL-SCNC: 29 MMOL/L (ref 20–33)
CREAT SERPL-MCNC: 0.31 MG/DL (ref 0.5–1.4)
ERYTHROCYTE [DISTWIDTH] IN BLOOD BY AUTOMATED COUNT: 51.8 FL (ref 35.9–50)
GLUCOSE BLD-MCNC: 116 MG/DL (ref 65–99)
GLUCOSE BLD-MCNC: 136 MG/DL (ref 65–99)
GLUCOSE BLD-MCNC: 140 MG/DL (ref 65–99)
GLUCOSE BLD-MCNC: 271 MG/DL (ref 65–99)
GLUCOSE SERPL-MCNC: 113 MG/DL (ref 65–99)
HCT VFR BLD AUTO: 36.1 % (ref 37–47)
HGB BLD-MCNC: 10.4 G/DL (ref 12–16)
MCH RBC QN AUTO: 22.6 PG (ref 27–33)
MCHC RBC AUTO-ENTMCNC: 28.8 G/DL (ref 33.6–35)
MCV RBC AUTO: 78.3 FL (ref 81.4–97.8)
PLATELET # BLD AUTO: 253 K/UL (ref 164–446)
PMV BLD AUTO: 9.9 FL (ref 9–12.9)
POTASSIUM SERPL-SCNC: 4.3 MMOL/L (ref 3.6–5.5)
RBC # BLD AUTO: 4.61 M/UL (ref 4.2–5.4)
SODIUM SERPL-SCNC: 140 MMOL/L (ref 135–145)
WBC # BLD AUTO: 6.4 K/UL (ref 4.8–10.8)

## 2021-04-26 PROCEDURE — 36415 COLL VENOUS BLD VENIPUNCTURE: CPT

## 2021-04-26 PROCEDURE — 700111 HCHG RX REV CODE 636 W/ 250 OVERRIDE (IP): Performed by: STUDENT IN AN ORGANIZED HEALTH CARE EDUCATION/TRAINING PROGRAM

## 2021-04-26 PROCEDURE — 85027 COMPLETE CBC AUTOMATED: CPT

## 2021-04-26 PROCEDURE — 700102 HCHG RX REV CODE 250 W/ 637 OVERRIDE(OP): Performed by: STUDENT IN AN ORGANIZED HEALTH CARE EDUCATION/TRAINING PROGRAM

## 2021-04-26 PROCEDURE — 770006 HCHG ROOM/CARE - MED/SURG/GYN SEMI*

## 2021-04-26 PROCEDURE — 94760 N-INVAS EAR/PLS OXIMETRY 1: CPT

## 2021-04-26 PROCEDURE — A9270 NON-COVERED ITEM OR SERVICE: HCPCS | Performed by: STUDENT IN AN ORGANIZED HEALTH CARE EDUCATION/TRAINING PROGRAM

## 2021-04-26 PROCEDURE — A9270 NON-COVERED ITEM OR SERVICE: HCPCS | Performed by: HOSPITALIST

## 2021-04-26 PROCEDURE — 99233 SBSQ HOSP IP/OBS HIGH 50: CPT | Performed by: HOSPITALIST

## 2021-04-26 PROCEDURE — 94640 AIRWAY INHALATION TREATMENT: CPT

## 2021-04-26 PROCEDURE — 80048 BASIC METABOLIC PNL TOTAL CA: CPT

## 2021-04-26 PROCEDURE — 700102 HCHG RX REV CODE 250 W/ 637 OVERRIDE(OP): Performed by: HOSPITALIST

## 2021-04-26 PROCEDURE — 700111 HCHG RX REV CODE 636 W/ 250 OVERRIDE (IP): Performed by: HOSPITALIST

## 2021-04-26 PROCEDURE — 82962 GLUCOSE BLOOD TEST: CPT | Mod: 91

## 2021-04-26 RX ADMIN — IBUPROFEN 600 MG: 600 TABLET ORAL at 22:50

## 2021-04-26 RX ADMIN — DOCUSATE SODIUM 50 MG AND SENNOSIDES 8.6 MG 2 TABLET: 8.6; 5 TABLET, FILM COATED ORAL at 17:03

## 2021-04-26 RX ADMIN — POTASSIUM CHLORIDE 20 MEQ: 1500 TABLET, EXTENDED RELEASE ORAL at 04:54

## 2021-04-26 RX ADMIN — ACETAMINOPHEN 1000 MG: 500 TABLET ORAL at 17:02

## 2021-04-26 RX ADMIN — BENZONATATE 100 MG: 100 CAPSULE ORAL at 11:42

## 2021-04-26 RX ADMIN — FUROSEMIDE 40 MG: 10 INJECTION, SOLUTION INTRAMUSCULAR; INTRAVENOUS at 04:54

## 2021-04-26 RX ADMIN — BENZONATATE 100 MG: 100 CAPSULE ORAL at 04:54

## 2021-04-26 RX ADMIN — ENOXAPARIN SODIUM 40 MG: 40 INJECTION SUBCUTANEOUS at 04:54

## 2021-04-26 RX ADMIN — ACETAMINOPHEN 1000 MG: 500 TABLET ORAL at 11:41

## 2021-04-26 RX ADMIN — BENZONATATE 100 MG: 100 CAPSULE ORAL at 17:03

## 2021-04-26 RX ADMIN — DEXAMETHASONE 6 MG: 4 TABLET ORAL at 04:54

## 2021-04-26 RX ADMIN — DOCUSATE SODIUM 50 MG AND SENNOSIDES 8.6 MG 2 TABLET: 8.6; 5 TABLET, FILM COATED ORAL at 04:54

## 2021-04-26 RX ADMIN — INSULIN LISPRO 3 UNITS: 100 INJECTION, SOLUTION INTRAVENOUS; SUBCUTANEOUS at 11:43

## 2021-04-26 RX ADMIN — ACETAMINOPHEN 1000 MG: 500 TABLET ORAL at 04:53

## 2021-04-26 ASSESSMENT — ENCOUNTER SYMPTOMS
FEVER: 0
PALPITATIONS: 0
MYALGIAS: 0
SHORTNESS OF BREATH: 1
WEIGHT LOSS: 0
NECK PAIN: 0
NAUSEA: 0
BLURRED VISION: 0
COUGH: 1
ABDOMINAL PAIN: 0
NEUROLOGICAL NEGATIVE: 1
VOMITING: 0
PHOTOPHOBIA: 0
CLAUDICATION: 0
PSYCHIATRIC NEGATIVE: 1
EYE PAIN: 0
BACK PAIN: 0
CHILLS: 0
HEARTBURN: 0
DOUBLE VISION: 0

## 2021-04-26 ASSESSMENT — FIBROSIS 4 INDEX: FIB4 SCORE: 1.32

## 2021-04-26 ASSESSMENT — PAIN DESCRIPTION - PAIN TYPE
TYPE: ACUTE PAIN
TYPE: ACUTE PAIN

## 2021-04-26 NOTE — DISCHARGE PLANNING
Care Transition Team Discharge Planning    Anticipated Discharge Disposition: TBD    Action: Lsw spoke with patient via the language line. Sue ID# [id number] was the interpretor. The patient presented A&Ox4 e/b being able to confirm information on the facesheet as being accurate. She stated that Marilyn Bro through [facility] is her PCP.    The patient reported that she was independent with all ADLs and IADLs. She denied having a substance abuse or mental health histories.    She verbally consented to the LTAC referral.    Lsw faxed CHOICE form to [facility].  Lsw placed CHOICE form in the CM basket.    Barriers to Discharge: Awaiting LTAC approval.    Plan: Lsw will assist medical team with DC planning.    Care Transition Team Assessment    Information Source  Orientation : Oriented x 4  Information Given By: Patient  Informant's Name: Moni Wray  Who is responsible for making decisions for patient? : Patient    Readmission Evaluation  Is this a readmission?: Yes - unplanned readmission  Why do you think you were readmitted?: Covid 19  Was an appointment arranged for you prior to discharge?: No  Why didn't you go to your appointment?: Other (comment)(Not appicable)  Were there new prescriptions you were supposed to fill after you were discharged?: No  Did you understand your discharge instructions?: Yes  Please explain: (Not applicable)  Did you have enough support after your last discharge?: Yes    Elopement Risk  Legal Hold: No  Ambulatory or Self Mobile in Wheelchair: Yes  Disoriented: No  Psychiatric Symptoms: None  History of Wandering: No  Elopement this Admit: No  Vocalizing Wanting to Leave: No  Displays Behaviors, Body Language Wanting to Leave: No-Not at Risk for Elopement  Elopement Risk: Not at Risk for Elopement    Interdisciplinary Discharge Planning  Lives with - Patient's Self Care Capacity: Other (Comments)(sister and neice)  Patient or legal guardian wants to designate a  caregiver: No  Support Systems: Family Member(s), Friends / Neighbors  Housing / Facility: 2 Story Apartment / Condo    Discharge Preparedness  What is your plan after discharge?: Home with help  What are your discharge supports?: Other (comment)(Nephew)  Prior Functional Level: Ambulatory, Independent with Activities of Daily Living, Independent with Medication Management  Difficulity with ADLs: None  Difficulity with IADLs: None    Functional Assesment  Prior Functional Level: Ambulatory, Independent with Activities of Daily Living, Independent with Medication Management    Finances  Financial Barriers to Discharge: Yes  Average Monthly Income: (pt only gets stimulus checks)  Source of Income: Other (comment)  Prescription Coverage: Yes    Vision / Hearing Impairment  Vision Impairment : No  Hearing Impairment : No         Advance Directive  Advance Directive?: None  Advance Directive offered?: AD Booklet refused    Domestic Abuse  Have you ever been the victim of abuse or violence?: No  Physical Abuse or Sexual Abuse: No  Verbal Abuse or Emotional Abuse: No  Possible Abuse/Neglect Reported to:: Not Applicable    Psychological Assessment  History of Substance Abuse: None  History of Psychiatric Problems: No  Non-compliant with Treatment: No  Newly Diagnosed Illness: Yes    Discharge Risks or Barriers  Discharge risks or barriers?: No PCP  Patient risk factors: Readmission    Anticipated Discharge Information  Discharge Disposition: Still a Patient (30)  Discharge Address: [address] [place] [id number]  Discharge Contact Phone Number: [phone]

## 2021-04-26 NOTE — DISCHARGE PLANNING
Received Choice form at 1212  Agency/Facility Name: [facility]  Referral sent per Choice form @ 1217

## 2021-04-26 NOTE — CARE PLAN
Problem: Pain Management  Goal: Pain level will decrease to patient's comfort goal  Outcome: PROGRESSING AS EXPECTED   Motrin and tylenol for headache.     Problem: Respiratory:  Goal: Respiratory status will improve  Outcome: PROGRESSING AS EXPECTED   Doing better! Still tires quickly and struggles to maintain sats with ambulation. As low as 40L 70% through evening.

## 2021-04-26 NOTE — CARE PLAN
Problem: Respiratory:  Goal: Respiratory status will improve  Outcome: PROGRESSING SLOWER THAN EXPECTED  Note: Encouraged use of IS     Problem: Discharge Barriers/Planning  Goal: Patient's continuum of care needs will be met  Note: Case coordination for SNF

## 2021-04-27 PROBLEM — I27.20 PULMONARY HTN (HCC): Status: ACTIVE | Noted: 2021-04-20

## 2021-04-27 LAB
ANION GAP SERPL CALC-SCNC: 7 MMOL/L (ref 7–16)
BUN SERPL-MCNC: 15 MG/DL (ref 8–22)
CALCIUM SERPL-MCNC: 9.1 MG/DL (ref 8.5–10.5)
CHLORIDE SERPL-SCNC: 98 MMOL/L (ref 96–112)
CO2 SERPL-SCNC: 32 MMOL/L (ref 20–33)
CREAT SERPL-MCNC: 0.32 MG/DL (ref 0.5–1.4)
ERYTHROCYTE [DISTWIDTH] IN BLOOD BY AUTOMATED COUNT: 50.2 FL (ref 35.9–50)
GLUCOSE BLD-MCNC: 110 MG/DL (ref 65–99)
GLUCOSE BLD-MCNC: 116 MG/DL (ref 65–99)
GLUCOSE BLD-MCNC: 183 MG/DL (ref 65–99)
GLUCOSE BLD-MCNC: 209 MG/DL (ref 65–99)
GLUCOSE SERPL-MCNC: 111 MG/DL (ref 65–99)
HCT VFR BLD AUTO: 34.5 % (ref 37–47)
HGB BLD-MCNC: 10.2 G/DL (ref 12–16)
MCH RBC QN AUTO: 22.7 PG (ref 27–33)
MCHC RBC AUTO-ENTMCNC: 29.6 G/DL (ref 33.6–35)
MCV RBC AUTO: 76.7 FL (ref 81.4–97.8)
PLATELET # BLD AUTO: 294 K/UL (ref 164–446)
PMV BLD AUTO: 9.5 FL (ref 9–12.9)
POTASSIUM SERPL-SCNC: 4.4 MMOL/L (ref 3.6–5.5)
RBC # BLD AUTO: 4.5 M/UL (ref 4.2–5.4)
SODIUM SERPL-SCNC: 137 MMOL/L (ref 135–145)
WBC # BLD AUTO: 7.1 K/UL (ref 4.8–10.8)

## 2021-04-27 PROCEDURE — 99233 SBSQ HOSP IP/OBS HIGH 50: CPT | Performed by: HOSPITALIST

## 2021-04-27 PROCEDURE — 700102 HCHG RX REV CODE 250 W/ 637 OVERRIDE(OP): Performed by: HOSPITALIST

## 2021-04-27 PROCEDURE — A9270 NON-COVERED ITEM OR SERVICE: HCPCS | Performed by: STUDENT IN AN ORGANIZED HEALTH CARE EDUCATION/TRAINING PROGRAM

## 2021-04-27 PROCEDURE — 700102 HCHG RX REV CODE 250 W/ 637 OVERRIDE(OP): Performed by: STUDENT IN AN ORGANIZED HEALTH CARE EDUCATION/TRAINING PROGRAM

## 2021-04-27 PROCEDURE — 36415 COLL VENOUS BLD VENIPUNCTURE: CPT

## 2021-04-27 PROCEDURE — 82962 GLUCOSE BLOOD TEST: CPT

## 2021-04-27 PROCEDURE — 80048 BASIC METABOLIC PNL TOTAL CA: CPT

## 2021-04-27 PROCEDURE — 700111 HCHG RX REV CODE 636 W/ 250 OVERRIDE (IP): Performed by: STUDENT IN AN ORGANIZED HEALTH CARE EDUCATION/TRAINING PROGRAM

## 2021-04-27 PROCEDURE — 85027 COMPLETE CBC AUTOMATED: CPT

## 2021-04-27 PROCEDURE — 94760 N-INVAS EAR/PLS OXIMETRY 1: CPT

## 2021-04-27 PROCEDURE — 770006 HCHG ROOM/CARE - MED/SURG/GYN SEMI*

## 2021-04-27 PROCEDURE — A9270 NON-COVERED ITEM OR SERVICE: HCPCS | Performed by: HOSPITALIST

## 2021-04-27 PROCEDURE — 700111 HCHG RX REV CODE 636 W/ 250 OVERRIDE (IP): Performed by: HOSPITALIST

## 2021-04-27 PROCEDURE — 94640 AIRWAY INHALATION TREATMENT: CPT

## 2021-04-27 RX ORDER — AZITHROMYCIN 250 MG/1
500 TABLET, FILM COATED ORAL DAILY
Status: COMPLETED | OUTPATIENT
Start: 2021-04-27 | End: 2021-04-29

## 2021-04-27 RX ADMIN — POTASSIUM CHLORIDE 20 MEQ: 1500 TABLET, EXTENDED RELEASE ORAL at 06:27

## 2021-04-27 RX ADMIN — DEXAMETHASONE 6 MG: 4 TABLET ORAL at 06:27

## 2021-04-27 RX ADMIN — INSULIN LISPRO 2 UNITS: 100 INJECTION, SOLUTION INTRAVENOUS; SUBCUTANEOUS at 11:48

## 2021-04-27 RX ADMIN — ACETAMINOPHEN 1000 MG: 500 TABLET ORAL at 16:40

## 2021-04-27 RX ADMIN — ENOXAPARIN SODIUM 40 MG: 40 INJECTION SUBCUTANEOUS at 06:27

## 2021-04-27 RX ADMIN — AZITHROMYCIN MONOHYDRATE 500 MG: 250 TABLET ORAL at 12:52

## 2021-04-27 RX ADMIN — ACETAMINOPHEN 1000 MG: 500 TABLET ORAL at 06:27

## 2021-04-27 RX ADMIN — BENZONATATE 100 MG: 100 CAPSULE ORAL at 16:40

## 2021-04-27 RX ADMIN — DOCUSATE SODIUM 50 MG AND SENNOSIDES 8.6 MG 2 TABLET: 8.6; 5 TABLET, FILM COATED ORAL at 06:27

## 2021-04-27 RX ADMIN — FUROSEMIDE 40 MG: 10 INJECTION, SOLUTION INTRAMUSCULAR; INTRAVENOUS at 06:26

## 2021-04-27 RX ADMIN — BENZONATATE 100 MG: 100 CAPSULE ORAL at 06:27

## 2021-04-27 RX ADMIN — BENZONATATE 100 MG: 100 CAPSULE ORAL at 11:46

## 2021-04-27 RX ADMIN — INSULIN LISPRO 1 UNITS: 100 INJECTION, SOLUTION INTRAVENOUS; SUBCUTANEOUS at 16:41

## 2021-04-27 RX ADMIN — ACETAMINOPHEN 1000 MG: 500 TABLET ORAL at 11:46

## 2021-04-27 ASSESSMENT — ENCOUNTER SYMPTOMS
PSYCHIATRIC NEGATIVE: 1
NAUSEA: 0
PALPITATIONS: 0
COUGH: 1
CLAUDICATION: 0
HEARTBURN: 0
PHOTOPHOBIA: 0
ABDOMINAL PAIN: 0
MYALGIAS: 0
NEUROLOGICAL NEGATIVE: 1
WEIGHT LOSS: 0
EYE PAIN: 0
NECK PAIN: 0
DOUBLE VISION: 0
VOMITING: 0
BACK PAIN: 0
BLURRED VISION: 0
CHILLS: 0
SHORTNESS OF BREATH: 1
FEVER: 0

## 2021-04-27 ASSESSMENT — FIBROSIS 4 INDEX: FIB4 SCORE: 1.14

## 2021-04-27 ASSESSMENT — PAIN DESCRIPTION - PAIN TYPE
TYPE: ACUTE PAIN
TYPE: ACUTE PAIN

## 2021-04-27 NOTE — PROGRESS NOTES
Hospital Medicine Daily Progress Note    Date of Service  4/26/2021    Chief Complaint  50 y.o. female admitted 4/20/2021 with sob    Hospital Course  No notes on file    Interval Problem Update   on HFNC 60% at 40 liters- so slight imrpovement    sob and  Cough-unchanged    Referred to LTAC    malaise    Afebrile      Consultants/Specialty  Id md    Code Status  Full Code    Disposition  pending    Review of Systems  Review of Systems   Constitutional: Positive for malaise/fatigue. Negative for chills, fever and weight loss.   HENT: Negative for ear pain, hearing loss and tinnitus.    Eyes: Negative for blurred vision, double vision, photophobia and pain.   Respiratory: Positive for cough and shortness of breath.    Cardiovascular: Negative for chest pain, palpitations and claudication.   Gastrointestinal: Negative for abdominal pain, heartburn, nausea and vomiting.   Genitourinary: Negative for dysuria, frequency, hematuria and urgency.   Musculoskeletal: Negative for back pain, joint pain, myalgias and neck pain.   Neurological: Negative.    Psychiatric/Behavioral: Negative.    All other systems reviewed and are negative.       Physical Exam  Temp:  [36 °C (96.8 °F)-36.6 °C (97.9 °F)] 36 °C (96.8 °F)  Pulse:  [60-84] 60  Resp:  [18-23] 19  BP: (113-137)/(57-82) 137/82  SpO2:  [90 %-95 %] 92 %    Physical Exam  Vitals reviewed.   Constitutional:       General: She is not in acute distress.     Appearance: She is ill-appearing. She is not toxic-appearing or diaphoretic.   HENT:      Head: Normocephalic and atraumatic.      Nose: Nose normal.      Mouth/Throat:      Mouth: Mucous membranes are moist.   Eyes:      General: No scleral icterus.        Left eye: No discharge.      Extraocular Movements: Extraocular movements intact.      Pupils: Pupils are equal, round, and reactive to light.   Cardiovascular:      Rate and Rhythm: Normal rate and regular rhythm.      Pulses: Normal pulses.      Heart sounds: No gallop.     Pulmonary:      Effort: Respiratory distress present.      Comments: Decrease bs at bases  Abdominal:      General: There is distension.      Palpations: There is no mass.      Tenderness: There is no right CVA tenderness, left CVA tenderness or guarding.      Hernia: No hernia is present.   Musculoskeletal:         General: No swelling, tenderness or deformity. Normal range of motion.      Cervical back: Normal range of motion and neck supple. No rigidity.      Right lower leg: Edema present.      Left lower leg: Edema present.   Lymphadenopathy:      Cervical: No cervical adenopathy.   Skin:     General: Skin is warm.      Capillary Refill: Capillary refill takes 2 to 3 seconds.      Coloration: Skin is not jaundiced or pale.      Findings: No bruising, erythema, lesion or rash.   Neurological:      General: No focal deficit present.      Mental Status: She is alert and oriented to person, place, and time.      Cranial Nerves: No cranial nerve deficit.      Motor: No weakness.      Gait: Gait normal.   Psychiatric:         Mood and Affect: Mood normal.         Fluids    Intake/Output Summary (Last 24 hours) at 4/26/2021 1808  Last data filed at 4/26/2021 1700  Gross per 24 hour   Intake 480 ml   Output 1300 ml   Net -820 ml       Laboratory  Recent Labs     04/24/21  0046 04/25/21  0608 04/26/21  0415   WBC 7.6 6.9 6.4   RBC 4.31 4.32 4.61   HEMOGLOBIN 9.8* 9.8* 10.4*   HEMATOCRIT 34.0* 33.3* 36.1*   MCV 78.9* 77.1* 78.3*   MCH 22.7* 22.7* 22.6*   MCHC 28.8* 29.4* 28.8*   RDW 52.5* 50.2* 51.8*   PLATELETCT 297 286 253   MPV 9.7 9.5 9.9     Recent Labs     04/24/21  0046 04/25/21  0608 04/26/21  0415   SODIUM 139 139 140   POTASSIUM 4.6 4.1 4.3   CHLORIDE 104 103 102   CO2 27 29 29   GLUCOSE 135* 108* 113*   BUN 14 15 17   CREATININE 0.36* 0.40* 0.31*   CALCIUM 9.1 8.6 8.7                   Imaging  EC-ECHOCARDIOGRAM COMPLETE W/O CONT   Final Result      DX-CHEST-PORTABLE (1 VIEW)   Final Result      New  moderate to severe multifocal consolidation is compatible with Covid pneumonia favored over bacterial pneumonia           Assessment/Plan  * Acute respiratory failure with hypoxia (HCC)- (present on admission)  Assessment & Plan    Chronic history of bilateral extremity edema without CHF and on Lasix at home        I's and O's  Daily weights  Echocardiogram results noted  Titrate oxygen as tolerable      Pneumonia due to COVID-19 virus- (present on admission)  Assessment & Plan    Complicated by acute hypoxic respiratory failure    Cont Decadron     procalcitonin negative    Pulmonary toilet with iv lasix    Robitussin and tessalon    Titrate oxygen as tolerable  Labs on a.m.    Decreased urine output  Assessment & Plan  Encourage po fluid intake        Morbidly obese (HCC)- (present on admission)  Assessment & Plan  Contributing to diabetes acute hypoxic respiratory failure  Discussed diet and exercise prior to discharge    Lower extremity edema- (present on admission)  Assessment & Plan  Elevate legs    Pulmonary infiltrates- (present on admission)  Assessment & Plan  Noted on Chest x-ray  Suggestive of covid      RT/O2  Titrate O2    Type 2 diabetes mellitus (HCC)- (present on admission)  Assessment & Plan  Hold home Metformin  Insulin sliding scale   Accu-Cheks         VTE prophylaxis: lovenox    Check am cbc, bmp

## 2021-04-27 NOTE — PROGRESS NOTES
Aaox4, feels better as verbalized, denies any discomfort, encouraged use of IS , awaiting placement, needs attended.

## 2021-04-27 NOTE — PROGRESS NOTES
Hospital Medicine Daily Progress Note    Date of Service  4/27/2021    Chief Complaint  50 y.o. female admitted 4/20/2021 with past medical history of chronic lower extremity edema on Lasix presents emergency department on 4/20/2021 with a 2-day history of worsening dyspnea on exertion, and cough.  Patient reports that she has been exposed to her niece who recently tested positive for Covid.  She did get her first Covid dose on 4/10/2021    Hospital Course  At the emergency department, vital signs with tachypnea up to the 30s, hypotension with SBP in the 90s.  Patient requiring 2 L nasal cannula to maintain saturations. CBC with microcytic anemia with elevated RDW, no leukocytosis.  Chemistry with mild hypokalemia, AST 66.  Lactic acid 1.3.  Covid nasopharyngeal swab positive. Chest x-ray showing moderate to severe multifocal consolidation which is compatible with Covid pneumonia. He received one dose of lasix.  Patient was admitted for acute hypoxic respiratory failure secondary to Covid pneumonia.     Interval Problem Update  4/27: Patient remains on high flow oxygen on 40 L, 50% FiO2.  I encouraged ice as ambulation.  We will continue Decadron, azithromycin.  Discontinue Lasix.    Consultants/Specialty  Id md    Code Status  Full Code    Disposition  pending    Review of Systems  Review of Systems   Constitutional: Positive for malaise/fatigue. Negative for chills, fever and weight loss.   HENT: Negative for ear pain, hearing loss and tinnitus.    Eyes: Negative for blurred vision, double vision, photophobia and pain.   Respiratory: Positive for cough and shortness of breath.    Cardiovascular: Negative for chest pain, palpitations and claudication.   Gastrointestinal: Negative for abdominal pain, heartburn, nausea and vomiting.   Genitourinary: Negative for dysuria, frequency, hematuria and urgency.   Musculoskeletal: Negative for back pain, joint pain, myalgias and neck pain.   Neurological: Negative.     Psychiatric/Behavioral: Negative.    All other systems reviewed and are negative.       Physical Exam  Temp:  [35.8 °C (96.5 °F)-36 °C (96.8 °F)] 35.8 °C (96.5 °F)  Pulse:  [60-81] 71  Resp:  [16-22] 18  BP: (102-137)/(46-82) 102/46  SpO2:  [90 %-95 %] 92 %    Physical Exam  Vitals reviewed.   Constitutional:       General: She is not in acute distress.     Appearance: She is ill-appearing. She is not toxic-appearing or diaphoretic.   HENT:      Head: Normocephalic and atraumatic.      Nose: Nose normal.      Mouth/Throat:      Mouth: Mucous membranes are moist.   Eyes:      General: No scleral icterus.        Left eye: No discharge.      Extraocular Movements: Extraocular movements intact.      Pupils: Pupils are equal, round, and reactive to light.   Cardiovascular:      Rate and Rhythm: Normal rate and regular rhythm.      Pulses: Normal pulses.      Heart sounds: No gallop.    Pulmonary:      Effort: Respiratory distress present.      Comments: Decrease bs at bases  Abdominal:      General: There is distension.      Palpations: There is no mass.      Tenderness: There is no right CVA tenderness, left CVA tenderness or guarding.      Hernia: No hernia is present.   Musculoskeletal:         General: No swelling, tenderness or deformity. Normal range of motion.      Cervical back: Normal range of motion and neck supple. No rigidity.      Right lower leg: Edema present.      Left lower leg: Edema present.   Lymphadenopathy:      Cervical: No cervical adenopathy.   Skin:     General: Skin is warm.      Capillary Refill: Capillary refill takes 2 to 3 seconds.      Coloration: Skin is not jaundiced or pale.      Findings: No bruising, erythema, lesion or rash.   Neurological:      General: No focal deficit present.      Mental Status: She is alert and oriented to person, place, and time.      Cranial Nerves: No cranial nerve deficit.      Motor: No weakness.      Gait: Gait normal.   Psychiatric:         Mood and  Affect: Mood normal.         Fluids    Intake/Output Summary (Last 24 hours) at 4/27/2021 1552  Last data filed at 4/27/2021 0800  Gross per 24 hour   Intake 600 ml   Output 750 ml   Net -150 ml       Laboratory  Recent Labs     04/25/21  0608 04/26/21 0415 04/27/21  0052   WBC 6.9 6.4 7.1   RBC 4.32 4.61 4.50   HEMOGLOBIN 9.8* 10.4* 10.2*   HEMATOCRIT 33.3* 36.1* 34.5*   MCV 77.1* 78.3* 76.7*   MCH 22.7* 22.6* 22.7*   MCHC 29.4* 28.8* 29.6*   RDW 50.2* 51.8* 50.2*   PLATELETCT 286 253 294   MPV 9.5 9.9 9.5     Recent Labs     04/25/21  0608 04/26/21 0415 04/27/21  0052   SODIUM 139 140 137   POTASSIUM 4.1 4.3 4.4   CHLORIDE 103 102 98   CO2 29 29 32   GLUCOSE 108* 113* 111*   BUN 15 17 15   CREATININE 0.40* 0.31* 0.32*   CALCIUM 8.6 8.7 9.1                   Imaging  EC-ECHOCARDIOGRAM COMPLETE W/O CONT   Final Result      DX-CHEST-PORTABLE (1 VIEW)   Final Result      New moderate to severe multifocal consolidation is compatible with Covid pneumonia favored over bacterial pneumonia           Assessment/Plan  * Pneumonia due to COVID-19 virus- (present on admission)  Assessment & Plan  Complicated by acute hypoxic respiratory failure  Cont Decadron   procalcitonin negative  Robitussin and tessalon  Titrate oxygen as tolerable      Decreased urine output  Assessment & Plan  Encourage po fluid intake        Morbidly obese (HCC)- (present on admission)  Assessment & Plan  Contributing to diabetes acute hypoxic respiratory failure  Discussed diet and exercise prior to discharge    Pulmonary HTN (HCC)- (present on admission)  Assessment & Plan  rvsp 55mmhg   Monitor volume status     Pulmonary infiltrates- (present on admission)  Assessment & Plan  Noted on Chest x-ray  Suggestive of covid      RT/O2  Titrate O2    Type 2 diabetes mellitus (HCC)- (present on admission)  Assessment & Plan  Hold home Metformin  Insulin sliding scale   Accu-Cheks      Acute respiratory failure with hypoxia (HCC)- (present on  admission)  Assessment & Plan    Chronic history of bilateral extremity edema without CHF and on Lasix at home        I's and O's  Daily weights  Echocardiogram results noted  Titrate oxygen as tolerable         VTE prophylaxis: lovenox

## 2021-04-27 NOTE — CARE PLAN
Problem: Pain Management  Goal: Pain level will decrease to patient's comfort goal  Outcome: PROGRESSING AS EXPECTED   Motrin and tylenol for headache.     Problem: Respiratory:  Goal: Respiratory status will improve  Outcome: PROGRESSING AS EXPECTED   Able to titrate HHF down to as low as 40L 50%. Less tachypneic, less dyspnea on exertion

## 2021-04-27 NOTE — DISCHARGE PLANNING
Agency/Facility Name: [facility]  Spoke To: Rosa Elena  Outcome: Did not receive referral, resent but they do not have a bed today.

## 2021-04-27 NOTE — CARE PLAN
Problem: Infection  Goal: Will remain free from infection  Outcome: PROGRESSING AS EXPECTED  Note: Check labs, vital signs     Problem: Discharge Barriers/Planning  Goal: Patient's continuum of care needs will be met  Outcome: PROGRESSING SLOWER THAN EXPECTED  Note: Placement coordination

## 2021-04-28 LAB
GLUCOSE BLD-MCNC: 119 MG/DL (ref 65–99)
GLUCOSE BLD-MCNC: 143 MG/DL (ref 65–99)
GLUCOSE BLD-MCNC: 261 MG/DL (ref 65–99)

## 2021-04-28 PROCEDURE — A9270 NON-COVERED ITEM OR SERVICE: HCPCS | Performed by: STUDENT IN AN ORGANIZED HEALTH CARE EDUCATION/TRAINING PROGRAM

## 2021-04-28 PROCEDURE — A9270 NON-COVERED ITEM OR SERVICE: HCPCS | Performed by: HOSPITALIST

## 2021-04-28 PROCEDURE — 94640 AIRWAY INHALATION TREATMENT: CPT

## 2021-04-28 PROCEDURE — 700102 HCHG RX REV CODE 250 W/ 637 OVERRIDE(OP): Performed by: STUDENT IN AN ORGANIZED HEALTH CARE EDUCATION/TRAINING PROGRAM

## 2021-04-28 PROCEDURE — 99233 SBSQ HOSP IP/OBS HIGH 50: CPT | Performed by: HOSPITALIST

## 2021-04-28 PROCEDURE — 700102 HCHG RX REV CODE 250 W/ 637 OVERRIDE(OP): Performed by: HOSPITALIST

## 2021-04-28 PROCEDURE — 82962 GLUCOSE BLOOD TEST: CPT

## 2021-04-28 PROCEDURE — 770006 HCHG ROOM/CARE - MED/SURG/GYN SEMI*

## 2021-04-28 PROCEDURE — 97162 PT EVAL MOD COMPLEX 30 MIN: CPT

## 2021-04-28 PROCEDURE — 700111 HCHG RX REV CODE 636 W/ 250 OVERRIDE (IP): Performed by: STUDENT IN AN ORGANIZED HEALTH CARE EDUCATION/TRAINING PROGRAM

## 2021-04-28 RX ORDER — FUROSEMIDE 40 MG/1
40 TABLET ORAL
Status: DISCONTINUED | OUTPATIENT
Start: 2021-04-28 | End: 2021-05-04

## 2021-04-28 RX ADMIN — ACETAMINOPHEN 1000 MG: 500 TABLET ORAL at 05:51

## 2021-04-28 RX ADMIN — DEXAMETHASONE 6 MG: 4 TABLET ORAL at 05:51

## 2021-04-28 RX ADMIN — INSULIN LISPRO 3 UNITS: 100 INJECTION, SOLUTION INTRAVENOUS; SUBCUTANEOUS at 11:35

## 2021-04-28 RX ADMIN — BENZONATATE 100 MG: 100 CAPSULE ORAL at 18:00

## 2021-04-28 RX ADMIN — BENZONATATE 100 MG: 100 CAPSULE ORAL at 05:51

## 2021-04-28 RX ADMIN — INSULIN LISPRO 1 UNITS: 100 INJECTION, SOLUTION INTRAVENOUS; SUBCUTANEOUS at 16:21

## 2021-04-28 RX ADMIN — ENOXAPARIN SODIUM 40 MG: 40 INJECTION SUBCUTANEOUS at 05:51

## 2021-04-28 RX ADMIN — BENZONATATE 100 MG: 100 CAPSULE ORAL at 11:34

## 2021-04-28 RX ADMIN — AZITHROMYCIN MONOHYDRATE 500 MG: 250 TABLET ORAL at 06:21

## 2021-04-28 RX ADMIN — POTASSIUM CHLORIDE 20 MEQ: 1500 TABLET, EXTENDED RELEASE ORAL at 05:51

## 2021-04-28 RX ADMIN — FUROSEMIDE 40 MG: 40 TABLET ORAL at 16:00

## 2021-04-28 RX ADMIN — ACETAMINOPHEN 1000 MG: 500 TABLET ORAL at 18:00

## 2021-04-28 RX ADMIN — ACETAMINOPHEN 1000 MG: 500 TABLET ORAL at 11:34

## 2021-04-28 ASSESSMENT — ENCOUNTER SYMPTOMS
NECK PAIN: 0
PHOTOPHOBIA: 0
COUGH: 1
BLURRED VISION: 0
NEUROLOGICAL NEGATIVE: 1
WEIGHT LOSS: 0
HEARTBURN: 0
PSYCHIATRIC NEGATIVE: 1
EYE PAIN: 0
VOMITING: 0
FEVER: 0
BACK PAIN: 0
CHILLS: 0
PALPITATIONS: 0
DOUBLE VISION: 0
NAUSEA: 0
SHORTNESS OF BREATH: 1
MYALGIAS: 0
CLAUDICATION: 0
ABDOMINAL PAIN: 0

## 2021-04-28 ASSESSMENT — COGNITIVE AND FUNCTIONAL STATUS - GENERAL
MOBILITY SCORE: 17
MOVING FROM LYING ON BACK TO SITTING ON SIDE OF FLAT BED: A LITTLE
CLIMB 3 TO 5 STEPS WITH RAILING: A LOT
SUGGESTED CMS G CODE MODIFIER MOBILITY: CK
WALKING IN HOSPITAL ROOM: A LITTLE
STANDING UP FROM CHAIR USING ARMS: A LITTLE
TURNING FROM BACK TO SIDE WHILE IN FLAT BAD: A LITTLE
MOVING TO AND FROM BED TO CHAIR: A LITTLE

## 2021-04-28 ASSESSMENT — FIBROSIS 4 INDEX: FIB4 SCORE: 1.14

## 2021-04-28 ASSESSMENT — PAIN DESCRIPTION - PAIN TYPE: TYPE: ACUTE PAIN

## 2021-04-28 ASSESSMENT — GAIT ASSESSMENTS: GAIT LEVEL OF ASSIST: UNABLE TO PARTICIPATE

## 2021-04-28 NOTE — CARE PLAN
Problem: Safety  Goal: Will remain free from injury  Outcome: PROGRESSING AS EXPECTED  Note: Pt will remain free from falls during hospital stay     Problem: Knowledge Deficit  Goal: Knowledge of disease process/condition, treatment plan, diagnostic tests, and medications will improve  Outcome: PROGRESSING AS EXPECTED  Note: Pt will understand POC and be able to teach back medications and understand what they are for

## 2021-04-28 NOTE — PROGRESS NOTES
[facility] Medicine Daily Progress Note    Date of Service  4/28/2021    Chief Complaint  50 y.o. female admitted 4/20/2021 with past medical history of chronic lower extremity edema on Lasix presents emergency department on 4/20/2021 with a 2-day history of worsening dyspnea on exertion, and cough.  Patient reports that she has been exposed to her niece who recently tested positive for Covid.  She did get her first Covid dose on 4/10/2021    Hospital Course  At the emergency department, vital signs with tachypnea up to the 30s, hypotension with SBP in the 90s.  Patient requiring 2 L nasal cannula to maintain saturations. CBC with microcytic anemia with elevated RDW, no leukocytosis.  Chemistry with mild hypokalemia, AST 66.  Lactic acid 1.3.  Covid nasopharyngeal swab positive. Chest x-ray showing moderate to severe multifocal consolidation which is compatible with Covid pneumonia. He received one dose of lasix.  Patient was admitted for acute hypoxic respiratory failure secondary to Covid pneumonia.     Interval Problem Update  4/27: Patient remains on high flow oxygen on 40 L, 50% FiO2.  I encouraged ice as ambulation.  We will continue Decadron, azithromycin.  Discontinue Lasix.  4/28: She continues to be on high flow oxygen 40 L at 40%.  Which is improvement since yesterday.  I have encouraged ISS and ambulation.  Continue Decadron and help wean off of high flow oxygen.  Consultants/Specialty  Id md    Code Status  Full Code    Disposition  pending    Review of Systems  Review of Systems   Constitutional: Positive for malaise/fatigue. Negative for chills, fever and weight loss.   HENT: Negative for ear pain, hearing loss and tinnitus.    Eyes: Negative for blurred vision, double vision, photophobia and pain.   Respiratory: Positive for cough and shortness of breath.    Cardiovascular: Negative for chest pain, palpitations and claudication.   Gastrointestinal: Negative for abdominal pain, heartburn, nausea and  vomiting.   Genitourinary: Negative for dysuria, frequency, hematuria and urgency.   Musculoskeletal: Negative for back pain, joint pain, myalgias and neck pain.   Neurological: Negative.    Psychiatric/Behavioral: Negative.    All other systems reviewed and are negative.       Physical Exam  Temp:  [36.2 °C (97.1 °F)-36.4 °C (97.5 °F)] 36.2 °C (97.1 °F)  Pulse:  [64-88] 88  Resp:  [16-22] 16  BP: (100-122)/(54-71) 100/71  SpO2:  [88 %-95 %] 91 %    Physical Exam  Vitals reviewed.   Constitutional:       General: She is not in acute distress.     Appearance: She is ill-appearing. She is not toxic-appearing or diaphoretic.   HENT:      Head: Normocephalic and atraumatic.      Nose: Nose normal.      Mouth/Throat:      Mouth: Mucous membranes are moist.   Eyes:      General: No scleral icterus.        Left eye: No discharge.      Extraocular Movements: Extraocular movements intact.      Pupils: Pupils are equal, round, and reactive to light.   Cardiovascular:      Rate and Rhythm: Normal rate and regular rhythm.      Pulses: Normal pulses.      Heart sounds: No gallop.    Pulmonary:      Effort: Respiratory distress present.      Comments: Decrease bs at bases  Abdominal:      General: There is distension.      Palpations: There is no mass.      Tenderness: There is no right CVA tenderness, left CVA tenderness or guarding.      Hernia: No hernia is present.   Musculoskeletal:         General: No swelling, tenderness or deformity. Normal range of motion.      Cervical back: Normal range of motion and neck supple. No rigidity.      Right lower leg: Edema present.      Left lower leg: Edema present.   Lymphadenopathy:      Cervical: No cervical adenopathy.   Skin:     General: Skin is warm.      Capillary Refill: Capillary refill takes 2 to 3 seconds.      Coloration: Skin is not jaundiced or pale.      Findings: No bruising, erythema, lesion or rash.   Neurological:      General: No focal deficit present.      Mental  Status: She is alert and oriented to person, place, and time.      Cranial Nerves: No cranial nerve deficit.      Motor: No weakness.      Gait: Gait normal.   Psychiatric:         Mood and Affect: Mood normal.         Fluids    Intake/Output Summary (Last 24 hours) at 4/28/2021 1523  Last data filed at 4/28/2021 0600  Gross per 24 hour   Intake 940 ml   Output 1550 ml   Net -610 ml       Laboratory  Recent Labs     04/26/21  0415 04/27/21  0052   WBC 6.4 7.1   RBC 4.61 4.50   HEMOGLOBIN 10.4* 10.2*   HEMATOCRIT 36.1* 34.5*   MCV 78.3* 76.7*   MCH 22.6* 22.7*   MCHC 28.8* 29.6*   RDW 51.8* 50.2*   PLATELETCT 253 294   MPV 9.9 9.5     Recent Labs     04/26/21 0415 04/27/21  0052   SODIUM 140 137   POTASSIUM 4.3 4.4   CHLORIDE 102 98   CO2 29 32   GLUCOSE 113* 111*   BUN 17 15   CREATININE 0.31* 0.32*   CALCIUM 8.7 9.1                   Imaging  EC-ECHOCARDIOGRAM COMPLETE W/O CONT   Final Result      DX-CHEST-PORTABLE (1 VIEW)   Final Result      New moderate to severe multifocal consolidation is compatible with Covid pneumonia favored over bacterial pneumonia           Assessment/Plan  * Pneumonia due to COVID-19 virus- (present on admission)  Assessment & Plan  Complicated by acute hypoxic respiratory failure  Cont Decadron   procalcitonin negative  Robitussin and tessalon  Titrate oxygen as tolerable      Decreased urine output  Assessment & Plan  Encourage po fluid intake        Morbidly obese (HCC)- (present on admission)  Assessment & Plan  Contributing to diabetes acute hypoxic respiratory failure  Discussed diet and exercise prior to discharge    Pulmonary HTN (HCC)- (present on admission)  Assessment & Plan  rvsp 55mmhg   Monitor volume status     Pulmonary infiltrates- (present on admission)  Assessment & Plan  Noted on Chest x-ray  Suggestive of covid      RT/O2  Titrate O2    Type 2 diabetes mellitus (HCC)- (present on admission)  Assessment & Plan  Hold home Metformin  Insulin sliding scale    Accu-Cheks      Acute respiratory failure with hypoxia (HCC)- (present on admission)  Assessment & Plan  Currently on high flow o2 40L 40% fio2   Chronic history of bilateral extremity edema without CHF and on Lasix at home        I's and O's  Daily weights  Echocardiogram results noted  Titrate oxygen as tolerable         VTE prophylaxis: lovenox

## 2021-04-28 NOTE — DISCHARGE PLANNING
Care Transition Team Discharge Planning    Anticipated Discharge Disposition: LTAC - pending.    Action: Dhaval spoke with [facility] ([facility] liasion who reported that they are waiting on a [facility] approval. Currently, there are no beds available, but there should be a bed available next week.     Barriers to Discharge: Awaiting LTAC approval.    Plan: Dhaval is working with medical team on discharge planning.

## 2021-04-28 NOTE — RESPIRATORY CARE
HHFNC 30L @ 40%. Attempted 15l oxymask and patients sats dropped to low 80's, tolerating hfnc well

## 2021-04-28 NOTE — THERAPY
Physical Therapy   Initial Evaluation     Patient Name: Moni Wray  Age:  50 y.o., Sex:  female  Medical Record #: [id number]  Today's Date: 4/28/2021     Precautions: (P) Fall Risk    Assessment  Patient is 50 y.o. female admitted with dyspnea on exertion, cough and found to be COVID+. Patient lives with niece in apartment on second floor with FOS to enter. Patient has a sister that lives nearby who can assist with IADLs. At time of evaluation, patient presenting with BLE weakness, impaired balance, decreased pulmonary endurance and overall decreased activity tolerance. Patient currently requiring HFNC 30L FIO2 50%, and desaturating to low 80 during activity requiring return to supine and 5min recovery for SPO2 >90%. At this time pt requiring placement.    Plan    Recommend Physical Therapy 3 times per week until therapy goals are met for the following treatments:  Bed Mobility, Gait Training and Stair Training    DC Equipment Recommendations: (P) Unable to determine at this time  Discharge Recommendations: (P) Recommend post-acute placement for additional physical therapy services prior to discharge home        Objective       04/28/21 1140   Prior Living Situation   Housing / Facility 1 Story Apartment / Condo   Steps Into Home   (FOS to second floor)   Steps In Home 0   Rail Both Rail (Steps in Home)   Bathroom Set up Bathtub / Shower Combination   Equipment Owned None   Lives with - Patient's Self Care Capacity Other (Comments)  (niece, sister lives nearby)   Comments sister and neice able to assist with IADLs   Prior Level of Functional Mobility   Bed Mobility Independent   Transfer Status Independent   Ambulation Independent   Distance Ambulation (Feet)   (limited community ambulator)   Assistive Devices Used None   Stairs Independent   Comments over last 3 months patient stating decline in endurance   Gait Analysis   Gait Level Of Assist Unable to Participate   Comments d/t desaturation   Bed  Mobility    Supine to Sit Supervised   Sit to Supine Supervised   Scooting Supervised   Rolling Supervised   Comments with use of bed features   Functional Mobility   Sit to Stand Supervised   Bed, Chair, Wheelchair Transfer Supervised   Transfer Method Stand Step   Mobility w/ FWW   Short Term Goals    Short Term Goal # 1 Pt to demo bed mobility IND without use of bed features within 6 visits in order to return to PLOF   Short Term Goal # 2 Pt to demo ambulation x250' mod-I within 6 visits in order to return to PLOF.    Short Term Goal # 3 Pt to demo stair negotiation x10 mod-I within 6 visits in order to return to PLOF.    Anticipated Discharge Equipment and Recommendations   DC Equipment Recommendations Unable to determine at this time   Discharge Recommendations Recommend post-acute placement for additional physical therapy services prior to discharge home

## 2021-04-28 NOTE — CARE PLAN
Problem: Communication  Goal: The ability to communicate needs accurately and effectively will improve  Outcome: PROGRESSING AS EXPECTED     Problem: Respiratory:  Goal: Respiratory status will improve  Outcome: PROGRESSING AS EXPECTED  Titrated down to hhf 40L 45%. Noted decrease in tachypnea and decreased work of breathing.

## 2021-04-28 NOTE — DISCHARGE PLANNING
@1006  Agency/Facility Name: [facility]  Spoke To: Rosa Elena  Outcome: Working on getting cost out on [facility] and insurance auth will take a couple days.    @[id number]  Agency/Facility Name: [facility]  Outcome: Left message, awaiting call back.

## 2021-04-29 LAB
GLUCOSE BLD-MCNC: 101 MG/DL (ref 65–99)
GLUCOSE BLD-MCNC: 174 MG/DL (ref 65–99)
GLUCOSE BLD-MCNC: 188 MG/DL (ref 65–99)
GLUCOSE BLD-MCNC: 190 MG/DL (ref 65–99)
GLUCOSE BLD-MCNC: 247 MG/DL (ref 65–99)

## 2021-04-29 PROCEDURE — 94640 AIRWAY INHALATION TREATMENT: CPT

## 2021-04-29 PROCEDURE — A9270 NON-COVERED ITEM OR SERVICE: HCPCS | Performed by: HOSPITALIST

## 2021-04-29 PROCEDURE — 700102 HCHG RX REV CODE 250 W/ 637 OVERRIDE(OP): Performed by: HOSPITALIST

## 2021-04-29 PROCEDURE — 99233 SBSQ HOSP IP/OBS HIGH 50: CPT | Performed by: HOSPITALIST

## 2021-04-29 PROCEDURE — 700102 HCHG RX REV CODE 250 W/ 637 OVERRIDE(OP): Performed by: STUDENT IN AN ORGANIZED HEALTH CARE EDUCATION/TRAINING PROGRAM

## 2021-04-29 PROCEDURE — 82962 GLUCOSE BLOOD TEST: CPT | Mod: 91

## 2021-04-29 PROCEDURE — 700111 HCHG RX REV CODE 636 W/ 250 OVERRIDE (IP): Performed by: STUDENT IN AN ORGANIZED HEALTH CARE EDUCATION/TRAINING PROGRAM

## 2021-04-29 PROCEDURE — A9270 NON-COVERED ITEM OR SERVICE: HCPCS | Performed by: STUDENT IN AN ORGANIZED HEALTH CARE EDUCATION/TRAINING PROGRAM

## 2021-04-29 PROCEDURE — 770006 HCHG ROOM/CARE - MED/SURG/GYN SEMI*

## 2021-04-29 RX ADMIN — INSULIN LISPRO 2 UNITS: 100 INJECTION, SOLUTION INTRAVENOUS; SUBCUTANEOUS at 11:00

## 2021-04-29 RX ADMIN — INSULIN LISPRO 1 UNITS: 100 INJECTION, SOLUTION INTRAVENOUS; SUBCUTANEOUS at 19:05

## 2021-04-29 RX ADMIN — ACETAMINOPHEN 1000 MG: 500 TABLET ORAL at 11:45

## 2021-04-29 RX ADMIN — BENZONATATE 100 MG: 100 CAPSULE ORAL at 11:45

## 2021-04-29 RX ADMIN — ACETAMINOPHEN 1000 MG: 500 TABLET ORAL at 17:19

## 2021-04-29 RX ADMIN — DEXAMETHASONE 6 MG: 4 TABLET ORAL at 05:35

## 2021-04-29 RX ADMIN — BENZONATATE 100 MG: 100 CAPSULE ORAL at 05:35

## 2021-04-29 RX ADMIN — POTASSIUM CHLORIDE 20 MEQ: 1500 TABLET, EXTENDED RELEASE ORAL at 05:35

## 2021-04-29 RX ADMIN — ACETAMINOPHEN 1000 MG: 500 TABLET ORAL at 05:35

## 2021-04-29 RX ADMIN — INSULIN LISPRO 1 UNITS: 100 INJECTION, SOLUTION INTRAVENOUS; SUBCUTANEOUS at 17:13

## 2021-04-29 RX ADMIN — ENOXAPARIN SODIUM 40 MG: 40 INJECTION SUBCUTANEOUS at 05:36

## 2021-04-29 RX ADMIN — BENZONATATE 100 MG: 100 CAPSULE ORAL at 17:19

## 2021-04-29 RX ADMIN — FUROSEMIDE 40 MG: 40 TABLET ORAL at 05:35

## 2021-04-29 RX ADMIN — AZITHROMYCIN MONOHYDRATE 500 MG: 250 TABLET ORAL at 05:35

## 2021-04-29 RX ADMIN — FUROSEMIDE 40 MG: 40 TABLET ORAL at 17:19

## 2021-04-29 ASSESSMENT — ENCOUNTER SYMPTOMS
MYALGIAS: 0
BLURRED VISION: 0
PSYCHIATRIC NEGATIVE: 1
WEIGHT LOSS: 0
NECK PAIN: 0
CLAUDICATION: 0
COUGH: 1
ABDOMINAL PAIN: 0
DOUBLE VISION: 0
CHILLS: 0
VOMITING: 0
BACK PAIN: 0
PHOTOPHOBIA: 0
FEVER: 0
NEUROLOGICAL NEGATIVE: 1
SHORTNESS OF BREATH: 1
HEARTBURN: 0
PALPITATIONS: 0
EYE PAIN: 0
NAUSEA: 0

## 2021-04-29 ASSESSMENT — FIBROSIS 4 INDEX: FIB4 SCORE: 1.14

## 2021-04-29 NOTE — CARE PLAN
Problem: Safety  Goal: Will remain free from falls  Outcome: PROGRESSING AS EXPECTED  Note: Bed locked and in lowest position, Pt has bedside commode within reach of bed, call light within reach, and all items within reach.       Problem: Respiratory:  Goal: Respiratory status will improve  Outcome: PROGRESSING SLOWER THAN EXPECTED  Note: Pt still requiring high flow nasal cannula oxygen at 30L Fi02 40%, encouraged incentive spirometer use, cough and deep breathing, to call for help if feeling increased shortness of breath.

## 2021-04-29 NOTE — PROGRESS NOTES
[facility] Medicine Daily Progress Note    Date of Service  4/29/2021    Chief Complaint  50 y.o. female admitted 4/20/2021 with past medical history of chronic lower extremity edema on Lasix presents emergency department on 4/20/2021 with a 2-day history of worsening dyspnea on exertion, and cough.  Patient reports that she has been exposed to her niece who recently tested positive for Covid.  She did get her first Covid dose on 4/10/2021    Hospital Course  At the emergency department, vital signs with tachypnea up to the 30s, hypotension with SBP in the 90s.  Patient requiring 2 L nasal cannula to maintain saturations. CBC with microcytic anemia with elevated RDW, no leukocytosis.  Chemistry with mild hypokalemia, AST 66.  Lactic acid 1.3.  Covid nasopharyngeal swab positive. Chest x-ray showing moderate to severe multifocal consolidation which is compatible with Covid pneumonia. He received one dose of lasix.  Patient was admitted for acute hypoxic respiratory failure secondary to Covid pneumonia.     Interval Problem Update  4/27: Patient remains on high flow oxygen on 40 L, 50% FiO2.  I encouraged ice as ambulation.  We will continue Decadron, azithromycin.  Discontinue Lasix.  4/28: She continues to be on high flow oxygen 40 L at 40%.  Which is improvement since yesterday.  I have encouraged ISS and ambulation.  Continue Decadron and help wean off of high flow oxygen.  4/29: Overall improvement in patient's hypoxia will continue to wean off oxygen as necessary.  Patient is below 6 L of oxygen we can discharge her.  Continue Decadron for now.  Consultants/Specialty  Id md    Code Status  Full Code    Disposition  pending    Review of Systems  Review of Systems   Constitutional: Positive for malaise/fatigue. Negative for chills, fever and weight loss.   HENT: Negative for ear pain, hearing loss and tinnitus.    Eyes: Negative for blurred vision, double vision, photophobia and pain.   Respiratory: Positive for  cough and shortness of breath.    Cardiovascular: Negative for chest pain, palpitations and claudication.   Gastrointestinal: Negative for abdominal pain, heartburn, nausea and vomiting.   Genitourinary: Negative for dysuria, frequency, hematuria and urgency.   Musculoskeletal: Negative for back pain, joint pain, myalgias and neck pain.   Neurological: Negative.    Psychiatric/Behavioral: Negative.    All other systems reviewed and are negative.       Physical Exam  Temp:  [35.8 °C (96.5 °F)-36.1 °C (97 °F)] 35.8 °C (96.5 °F)  Pulse:  [57-94] 94  Resp:  [16-20] 18  BP: (103-125)/(51-70) 125/64  SpO2:  [91 %-95 %] 93 %    Physical Exam  Vitals reviewed.   Constitutional:       General: She is not in acute distress.     Appearance: She is ill-appearing. She is not toxic-appearing or diaphoretic.   HENT:      Head: Normocephalic and atraumatic.      Nose: Nose normal.      Mouth/Throat:      Mouth: Mucous membranes are moist.   Eyes:      General: No scleral icterus.        Left eye: No discharge.      Extraocular Movements: Extraocular movements intact.      Pupils: Pupils are equal, round, and reactive to light.   Cardiovascular:      Rate and Rhythm: Normal rate and regular rhythm.      Pulses: Normal pulses.      Heart sounds: No gallop.    Pulmonary:      Effort: Respiratory distress present.      Comments: Decrease bs at bases  Abdominal:      General: There is distension.      Palpations: There is no mass.      Tenderness: There is no right CVA tenderness, left CVA tenderness or guarding.      Hernia: No hernia is present.   Musculoskeletal:         General: No swelling, tenderness or deformity. Normal range of motion.      Cervical back: Normal range of motion and neck supple. No rigidity.      Right lower leg: Edema present.      Left lower leg: Edema present.   Lymphadenopathy:      Cervical: No cervical adenopathy.   Skin:     General: Skin is warm.      Capillary Refill: Capillary refill takes 2 to 3  seconds.      Coloration: Skin is not jaundiced or pale.      Findings: No bruising, erythema, lesion or rash.   Neurological:      General: No focal deficit present.      Mental Status: She is alert and oriented to person, place, and time.      Cranial Nerves: No cranial nerve deficit.      Motor: No weakness.      Gait: Gait normal.   Psychiatric:         Mood and Affect: Mood normal.         Fluids    Intake/Output Summary (Last 24 hours) at 4/29/2021 1507  Last data filed at 4/29/2021 0200  Gross per 24 hour   Intake --   Output 1200 ml   Net -1200 ml       Laboratory  Recent Labs     04/27/21  0052   WBC 7.1   RBC 4.50   HEMOGLOBIN 10.2*   HEMATOCRIT 34.5*   MCV 76.7*   MCH 22.7*   MCHC 29.6*   RDW 50.2*   PLATELETCT 294   MPV 9.5     Recent Labs     04/27/21  0052   SODIUM 137   POTASSIUM 4.4   CHLORIDE 98   CO2 32   GLUCOSE 111*   BUN 15   CREATININE 0.32*   CALCIUM 9.1                   Imaging  EC-ECHOCARDIOGRAM COMPLETE W/O CONT   Final Result      DX-CHEST-PORTABLE (1 VIEW)   Final Result      New moderate to severe multifocal consolidation is compatible with Covid pneumonia favored over bacterial pneumonia           Assessment/Plan  * Pneumonia due to COVID-19 virus- (present on admission)  Assessment & Plan  Complicated by acute hypoxic respiratory failure  Cont Decadron   procalcitonin negative  Robitussin and tessalon  Titrate oxygen as tolerable      Decreased urine output  Assessment & Plan  Encourage po fluid intake        Morbidly obese (HCC)- (present on admission)  Assessment & Plan  Contributing to diabetes acute hypoxic respiratory failure  Discussed diet and exercise prior to discharge    Pulmonary HTN (HCC)- (present on admission)  Assessment & Plan  rvsp 55mmhg   Monitor volume status     Pulmonary infiltrates- (present on admission)  Assessment & Plan  Noted on Chest x-ray  Suggestive of covid      RT/O2  Titrate O2    Type 2 diabetes mellitus (HCC)- (present on admission)  Assessment &  Plan  Hold home Metformin  Insulin sliding scale   Accu-Cheks      Acute respiratory failure with hypoxia (HCC)- (present on admission)  Assessment & Plan  Currently on high flow o2 40L 40% fio2   Chronic history of bilateral extremity edema without CHF and on Lasix at home        I's and O's  Daily weights  Echocardiogram results noted  Titrate oxygen as tolerable         VTE prophylaxis: lovenox

## 2021-04-29 NOTE — CARE PLAN
Update pt on plan of care. Pt wanted to lay bed to sleep, but educated her that it is better for her to sit up due to her high flow o2. Pt o2 sat increased within expected range Pt satisfied with plan of care

## 2021-04-30 LAB
GLUCOSE BLD-MCNC: 103 MG/DL (ref 65–99)
GLUCOSE BLD-MCNC: 148 MG/DL (ref 65–99)
GLUCOSE BLD-MCNC: 189 MG/DL (ref 65–99)
GLUCOSE BLD-MCNC: 226 MG/DL (ref 65–99)

## 2021-04-30 PROCEDURE — A9270 NON-COVERED ITEM OR SERVICE: HCPCS | Performed by: STUDENT IN AN ORGANIZED HEALTH CARE EDUCATION/TRAINING PROGRAM

## 2021-04-30 PROCEDURE — 94760 N-INVAS EAR/PLS OXIMETRY 1: CPT

## 2021-04-30 PROCEDURE — 700111 HCHG RX REV CODE 636 W/ 250 OVERRIDE (IP): Performed by: STUDENT IN AN ORGANIZED HEALTH CARE EDUCATION/TRAINING PROGRAM

## 2021-04-30 PROCEDURE — 82962 GLUCOSE BLOOD TEST: CPT

## 2021-04-30 PROCEDURE — 770006 HCHG ROOM/CARE - MED/SURG/GYN SEMI*

## 2021-04-30 PROCEDURE — 700102 HCHG RX REV CODE 250 W/ 637 OVERRIDE(OP): Performed by: HOSPITALIST

## 2021-04-30 PROCEDURE — 700102 HCHG RX REV CODE 250 W/ 637 OVERRIDE(OP): Performed by: STUDENT IN AN ORGANIZED HEALTH CARE EDUCATION/TRAINING PROGRAM

## 2021-04-30 PROCEDURE — A9270 NON-COVERED ITEM OR SERVICE: HCPCS | Performed by: HOSPITALIST

## 2021-04-30 PROCEDURE — 94640 AIRWAY INHALATION TREATMENT: CPT

## 2021-04-30 PROCEDURE — 99233 SBSQ HOSP IP/OBS HIGH 50: CPT | Performed by: HOSPITALIST

## 2021-04-30 RX ADMIN — DOCUSATE SODIUM 50 MG AND SENNOSIDES 8.6 MG 2 TABLET: 8.6; 5 TABLET, FILM COATED ORAL at 17:24

## 2021-04-30 RX ADMIN — INSULIN LISPRO 2 UNITS: 100 INJECTION, SOLUTION INTRAVENOUS; SUBCUTANEOUS at 12:02

## 2021-04-30 RX ADMIN — BENZONATATE 100 MG: 100 CAPSULE ORAL at 12:02

## 2021-04-30 RX ADMIN — FUROSEMIDE 40 MG: 40 TABLET ORAL at 16:35

## 2021-04-30 RX ADMIN — POTASSIUM CHLORIDE 20 MEQ: 1500 TABLET, EXTENDED RELEASE ORAL at 05:12

## 2021-04-30 RX ADMIN — ACETAMINOPHEN 1000 MG: 500 TABLET ORAL at 05:12

## 2021-04-30 RX ADMIN — FUROSEMIDE 40 MG: 40 TABLET ORAL at 05:13

## 2021-04-30 RX ADMIN — ENOXAPARIN SODIUM 40 MG: 40 INJECTION SUBCUTANEOUS at 05:12

## 2021-04-30 RX ADMIN — ACETAMINOPHEN 1000 MG: 500 TABLET ORAL at 12:02

## 2021-04-30 RX ADMIN — BENZONATATE 100 MG: 100 CAPSULE ORAL at 17:24

## 2021-04-30 RX ADMIN — DEXAMETHASONE 6 MG: 4 TABLET ORAL at 05:12

## 2021-04-30 RX ADMIN — INSULIN LISPRO 1 UNITS: 100 INJECTION, SOLUTION INTRAVENOUS; SUBCUTANEOUS at 19:21

## 2021-04-30 RX ADMIN — ACETAMINOPHEN 1000 MG: 500 TABLET ORAL at 17:23

## 2021-04-30 RX ADMIN — BENZONATATE 100 MG: 100 CAPSULE ORAL at 05:12

## 2021-04-30 ASSESSMENT — ENCOUNTER SYMPTOMS
NEUROLOGICAL NEGATIVE: 1
MYALGIAS: 0
HEARTBURN: 0
ABDOMINAL PAIN: 0
VOMITING: 0
BLURRED VISION: 0
COUGH: 1
NECK PAIN: 0
PHOTOPHOBIA: 0
CHILLS: 0
CLAUDICATION: 0
NAUSEA: 0
PALPITATIONS: 0
FEVER: 0
WEIGHT LOSS: 0
DOUBLE VISION: 0
PSYCHIATRIC NEGATIVE: 1
SHORTNESS OF BREATH: 1
EYE PAIN: 0
BACK PAIN: 0

## 2021-04-30 ASSESSMENT — FIBROSIS 4 INDEX
FIB4 SCORE: 1.14
FIB4 SCORE: 1.14

## 2021-04-30 ASSESSMENT — PATIENT HEALTH QUESTIONNAIRE - PHQ9
SUM OF ALL RESPONSES TO PHQ9 QUESTIONS 1 AND 2: 0
1. LITTLE INTEREST OR PLEASURE IN DOING THINGS: NOT AT ALL

## 2021-04-30 NOTE — CARE PLAN
Updated pt on plan of care. She is satisfied. Current o2 settings are working well for her as she has been maintaining in lower 90s for o2 saturation

## 2021-04-30 NOTE — PROGRESS NOTES
Hospital Medicine Daily Progress Note    Date of Service  4/30/2021    Chief Complaint  50 y.o. female admitted 4/20/2021 with past medical history of chronic lower extremity edema on Lasix presents emergency department on 4/20/2021 with a 2-day history of worsening dyspnea on exertion, and cough.  Patient reports that she has been exposed to her niece who recently tested positive for Covid.  She did get her first Covid dose on 4/10/2021    Hospital Course  At the emergency department, vital signs with tachypnea up to the 30s, hypotension with SBP in the 90s.  Patient requiring 2 L nasal cannula to maintain saturations. CBC with microcytic anemia with elevated RDW, no leukocytosis.  Chemistry with mild hypokalemia, AST 66.  Lactic acid 1.3.  Covid nasopharyngeal swab positive. Chest x-ray showing moderate to severe multifocal consolidation which is compatible with Covid pneumonia. He received one dose of lasix.  Patient was admitted for acute hypoxic respiratory failure secondary to Covid pneumonia.     Interval Problem Update  4/27: Patient remains on high flow oxygen on 40 L, 50% FiO2.  I encouraged ice as ambulation.  We will continue Decadron, azithromycin.  Discontinue Lasix.  4/28: She continues to be on high flow oxygen 40 L at 40%.  Which is improvement since yesterday.  I have encouraged ISS and ambulation.  Continue Decadron and help wean off of high flow oxygen.  4/29: Overall improvement in patient's hypoxia will continue to wean off oxygen as necessary.  Patient is below 6 L of oxygen we can discharge her.  Continue Decadron for now.  4/30: Continue to encourage ISS and ambulation.  We will continue to wean her off oxygen.  Currently the patient is on high flow nasal cannula 40% 40 L.  Consultants/Specialty  Id md    Code Status  Full Code    Disposition  pending    Review of Systems  Review of Systems   Constitutional: Positive for malaise/fatigue. Negative for chills, fever and weight loss.   HENT:  Negative for ear pain, hearing loss and tinnitus.    Eyes: Negative for blurred vision, double vision, photophobia and pain.   Respiratory: Positive for cough and shortness of breath.    Cardiovascular: Negative for chest pain, palpitations and claudication.   Gastrointestinal: Negative for abdominal pain, heartburn, nausea and vomiting.   Genitourinary: Negative for dysuria, frequency, hematuria and urgency.   Musculoskeletal: Negative for back pain, joint pain, myalgias and neck pain.   Neurological: Negative.    Psychiatric/Behavioral: Negative.    All other systems reviewed and are negative.       Physical Exam  Temp:  [35.8 °C (96.5 °F)-36.8 °C (98.2 °F)] 36.1 °C (96.9 °F)  Pulse:  [61-94] 68  Resp:  [18-20] 20  BP: (103-135)/(51-72) 135/72  SpO2:  [91 %-97 %] 94 %    Physical Exam  Vitals reviewed.   Constitutional:       General: She is not in acute distress.     Appearance: She is ill-appearing. She is not toxic-appearing or diaphoretic.   HENT:      Head: Normocephalic and atraumatic.      Nose: Nose normal.      Mouth/Throat:      Mouth: Mucous membranes are moist.   Eyes:      General: No scleral icterus.        Left eye: No discharge.      Extraocular Movements: Extraocular movements intact.      Pupils: Pupils are equal, round, and reactive to light.   Cardiovascular:      Rate and Rhythm: Normal rate and regular rhythm.      Pulses: Normal pulses.      Heart sounds: No gallop.    Pulmonary:      Effort: Respiratory distress present.      Comments: Decrease bs at bases  Abdominal:      General: There is distension.      Palpations: There is no mass.      Tenderness: There is no right CVA tenderness, left CVA tenderness or guarding.      Hernia: No hernia is present.   Musculoskeletal:         General: No swelling, tenderness or deformity. Normal range of motion.      Cervical back: Normal range of motion and neck supple. No rigidity.      Right lower leg: Edema present.      Left lower leg: Edema  present.   Lymphadenopathy:      Cervical: No cervical adenopathy.   Skin:     General: Skin is warm.      Capillary Refill: Capillary refill takes 2 to 3 seconds.      Coloration: Skin is not jaundiced or pale.      Findings: No bruising, erythema, lesion or rash.   Neurological:      General: No focal deficit present.      Mental Status: She is alert and oriented to person, place, and time.      Cranial Nerves: No cranial nerve deficit.      Motor: No weakness.      Gait: Gait normal.   Psychiatric:         Mood and Affect: Mood normal.         Fluids    Intake/Output Summary (Last 24 hours) at 4/30/2021 1400  Last data filed at 4/30/2021 0518  Gross per 24 hour   Intake --   Output 1400 ml   Net -1400 ml       Laboratory                        Imaging  EC-ECHOCARDIOGRAM COMPLETE W/O CONT   Final Result      DX-CHEST-PORTABLE (1 VIEW)   Final Result      New moderate to severe multifocal consolidation is compatible with Covid pneumonia favored over bacterial pneumonia           Assessment/Plan  * Pneumonia due to COVID-19 virus- (present on admission)  Assessment & Plan  Complicated by acute hypoxic respiratory failure  Cont Decadron   procalcitonin negative  Robitussin and tessalon  Titrate oxygen as tolerable      Decreased urine output  Assessment & Plan  Encourage po fluid intake        Morbidly obese (HCC)- (present on admission)  Assessment & Plan  Contributing to diabetes acute hypoxic respiratory failure  Discussed diet and exercise prior to discharge    Pulmonary HTN (HCC)- (present on admission)  Assessment & Plan  rvsp 55mmhg   Monitor volume status     Pulmonary infiltrates- (present on admission)  Assessment & Plan  Noted on Chest x-ray  Suggestive of covid      RT/O2  Titrate O2    Type 2 diabetes mellitus (HCC)- (present on admission)  Assessment & Plan  Hold home Metformin  Insulin sliding scale   Accu-Cheks      Acute respiratory failure with hypoxia (HCC)- (present on admission)  Assessment &  Plan  Currently on high flow o2 40L 40% fio2   Chronic history of bilateral extremity edema without CHF and on Lasix at home        I's and O's  Daily weights  Echocardiogram results noted  Titrate oxygen as tolerable         VTE prophylaxis: lovenox

## 2021-04-30 NOTE — CARE PLAN
Problem: Communication  Goal: The ability to communicate needs accurately and effectively will improve  Outcome: PROGRESSING AS EXPECTED     Problem: Safety  Goal: Will remain free from injury  Outcome: PROGRESSING AS EXPECTED  Goal: Will remain free from falls  Outcome: PROGRESSING AS EXPECTED     Problem: Infection  Goal: Will remain free from infection  Outcome: PROGRESSING AS EXPECTED     Problem: Venous Thromboembolism (VTW)/Deep Vein Thrombosis (DVT) Prevention:  Goal: Patient will participate in Venous Thrombosis (VTE)/Deep Vein Thrombosis (DVT)Prevention Measures  Outcome: PROGRESSING AS EXPECTED     Problem: Bowel/Gastric:  Goal: Normal bowel function is maintained or improved  Outcome: PROGRESSING AS EXPECTED  Goal: Will not experience complications related to bowel motility  Outcome: PROGRESSING AS EXPECTED     Problem: Knowledge Deficit  Goal: Knowledge of disease process/condition, treatment plan, diagnostic tests, and medications will improve  Outcome: PROGRESSING AS EXPECTED  Goal: Knowledge of the prescribed therapeutic regimen will improve  Outcome: PROGRESSING AS EXPECTED     Problem: Discharge Barriers/Planning  Goal: Patient's continuum of care needs will be met  Outcome: PROGRESSING AS EXPECTED     Problem: Pain Management  Goal: Pain level will decrease to patient's comfort goal  Outcome: PROGRESSING AS EXPECTED     Problem: Respiratory:  Goal: Respiratory status will improve  Outcome: PROGRESSING AS EXPECTED     Problem: Fluid Volume:  Goal: Will maintain balanced intake and output  Outcome: PROGRESSING AS EXPECTED     Problem: Urinary Elimination:  Goal: Ability to reestablish a normal urinary elimination pattern will improve  Outcome: PROGRESSING AS EXPECTED

## 2021-05-01 LAB
GLUCOSE BLD-MCNC: 121 MG/DL (ref 65–99)
GLUCOSE BLD-MCNC: 208 MG/DL (ref 65–99)
GLUCOSE BLD-MCNC: 234 MG/DL (ref 65–99)

## 2021-05-01 PROCEDURE — 82962 GLUCOSE BLOOD TEST: CPT | Mod: 91

## 2021-05-01 PROCEDURE — A9270 NON-COVERED ITEM OR SERVICE: HCPCS | Performed by: HOSPITALIST

## 2021-05-01 PROCEDURE — 94640 AIRWAY INHALATION TREATMENT: CPT

## 2021-05-01 PROCEDURE — 94760 N-INVAS EAR/PLS OXIMETRY 1: CPT

## 2021-05-01 PROCEDURE — 700102 HCHG RX REV CODE 250 W/ 637 OVERRIDE(OP): Performed by: STUDENT IN AN ORGANIZED HEALTH CARE EDUCATION/TRAINING PROGRAM

## 2021-05-01 PROCEDURE — 700102 HCHG RX REV CODE 250 W/ 637 OVERRIDE(OP): Performed by: HOSPITALIST

## 2021-05-01 PROCEDURE — 700111 HCHG RX REV CODE 636 W/ 250 OVERRIDE (IP): Performed by: STUDENT IN AN ORGANIZED HEALTH CARE EDUCATION/TRAINING PROGRAM

## 2021-05-01 PROCEDURE — 770006 HCHG ROOM/CARE - MED/SURG/GYN SEMI*

## 2021-05-01 PROCEDURE — 99233 SBSQ HOSP IP/OBS HIGH 50: CPT | Performed by: HOSPITALIST

## 2021-05-01 PROCEDURE — A9270 NON-COVERED ITEM OR SERVICE: HCPCS | Performed by: STUDENT IN AN ORGANIZED HEALTH CARE EDUCATION/TRAINING PROGRAM

## 2021-05-01 RX ADMIN — ACETAMINOPHEN 1000 MG: 500 TABLET ORAL at 17:27

## 2021-05-01 RX ADMIN — INSULIN LISPRO 2 UNITS: 100 INJECTION, SOLUTION INTRAVENOUS; SUBCUTANEOUS at 19:10

## 2021-05-01 RX ADMIN — POTASSIUM CHLORIDE 20 MEQ: 1500 TABLET, EXTENDED RELEASE ORAL at 05:20

## 2021-05-01 RX ADMIN — BENZONATATE 100 MG: 100 CAPSULE ORAL at 05:20

## 2021-05-01 RX ADMIN — INSULIN LISPRO 2 UNITS: 100 INJECTION, SOLUTION INTRAVENOUS; SUBCUTANEOUS at 11:48

## 2021-05-01 RX ADMIN — BENZONATATE 100 MG: 100 CAPSULE ORAL at 17:27

## 2021-05-01 RX ADMIN — ENOXAPARIN SODIUM 40 MG: 40 INJECTION SUBCUTANEOUS at 05:19

## 2021-05-01 RX ADMIN — ACETAMINOPHEN 1000 MG: 500 TABLET ORAL at 05:20

## 2021-05-01 RX ADMIN — BENZONATATE 100 MG: 100 CAPSULE ORAL at 11:47

## 2021-05-01 RX ADMIN — ACETAMINOPHEN 1000 MG: 500 TABLET ORAL at 11:47

## 2021-05-01 RX ADMIN — INSULIN LISPRO 1 UNITS: 100 INJECTION, SOLUTION INTRAVENOUS; SUBCUTANEOUS at 17:28

## 2021-05-01 RX ADMIN — FUROSEMIDE 40 MG: 40 TABLET ORAL at 05:20

## 2021-05-01 RX ADMIN — DEXAMETHASONE 6 MG: 4 TABLET ORAL at 05:20

## 2021-05-01 RX ADMIN — FUROSEMIDE 40 MG: 40 TABLET ORAL at 17:27

## 2021-05-01 ASSESSMENT — ENCOUNTER SYMPTOMS
CLAUDICATION: 0
FEVER: 0
SHORTNESS OF BREATH: 1
WEIGHT LOSS: 0
EYE PAIN: 0
BLURRED VISION: 0
MYALGIAS: 0
NECK PAIN: 0
COUGH: 1
CHILLS: 0
VOMITING: 0
PSYCHIATRIC NEGATIVE: 1
NAUSEA: 0
DOUBLE VISION: 0
HEARTBURN: 0
ABDOMINAL PAIN: 0
BACK PAIN: 0
PALPITATIONS: 0
PHOTOPHOBIA: 0
NEUROLOGICAL NEGATIVE: 1

## 2021-05-01 ASSESSMENT — PATIENT HEALTH QUESTIONNAIRE - PHQ9
1. LITTLE INTEREST OR PLEASURE IN DOING THINGS: NOT AT ALL
SUM OF ALL RESPONSES TO PHQ9 QUESTIONS 1 AND 2: 0

## 2021-05-01 ASSESSMENT — FIBROSIS 4 INDEX: FIB4 SCORE: 1.14

## 2021-05-01 NOTE — PROGRESS NOTES
[facility] Medicine Daily Progress Note    Date of Service  5/1/2021    Chief Complaint  50 y.o. female admitted 4/20/2021 with past medical history of chronic lower extremity edema on Lasix presents emergency department on 4/20/2021 with a 2-day history of worsening dyspnea on exertion, and cough.  Patient reports that she has been exposed to her niece who recently tested positive for Covid.  She did get her first Covid dose on 4/10/2021    Hospital Course  At the emergency department, vital signs with tachypnea up to the 30s, hypotension with SBP in the 90s.  Patient requiring 2 L nasal cannula to maintain saturations. CBC with microcytic anemia with elevated RDW, no leukocytosis.  Chemistry with mild hypokalemia, AST 66.  Lactic acid 1.3.  Covid nasopharyngeal swab positive. Chest x-ray showing moderate to severe multifocal consolidation which is compatible with Covid pneumonia. He received one dose of lasix.  Patient was admitted for acute hypoxic respiratory failure secondary to Covid pneumonia.     Interval Problem Update  4/27: Patient remains on high flow oxygen on 40 L, 50% FiO2.  I encouraged ice as ambulation.  We will continue Decadron, azithromycin.  Discontinue Lasix.  4/28: She continues to be on high flow oxygen 40 L at 40%.  Which is improvement since yesterday.  I have encouraged ISS and ambulation.  Continue Decadron and help wean off of high flow oxygen.  4/29: Overall improvement in patient's hypoxia will continue to wean off oxygen as necessary.  Patient is below 6 L of oxygen we can discharge her.  Continue Decadron for now.  4/30: Continue to encourage ISS and ambulation.  We will continue to wean her off oxygen.  Currently the patient is on high flow nasal cannula 40% 40 L.  5/1: Continue to wean off of high flow oxygen  Consultants/Specialty  Id md    Code Status  Full Code    Disposition  pending    Review of Systems  Review of Systems   Constitutional: Positive for malaise/fatigue. Negative  for chills, fever and weight loss.   HENT: Negative for ear pain, hearing loss and tinnitus.    Eyes: Negative for blurred vision, double vision, photophobia and pain.   Respiratory: Positive for cough and shortness of breath.    Cardiovascular: Negative for chest pain, palpitations and claudication.   Gastrointestinal: Negative for abdominal pain, heartburn, nausea and vomiting.   Genitourinary: Negative for dysuria, frequency, hematuria and urgency.   Musculoskeletal: Negative for back pain, joint pain, myalgias and neck pain.   Neurological: Negative.    Psychiatric/Behavioral: Negative.    All other systems reviewed and are negative.       Physical Exam  Temp:  [35.8 °C (96.5 °F)-36.2 °C (97.1 °F)] 36.2 °C (97.1 °F)  Pulse:  [60-75] 75  Resp:  [18-20] 18  BP: (106-110)/(49-69) 106/49  SpO2:  [91 %-96 %] 93 %    Physical Exam  Vitals reviewed.   Constitutional:       General: She is not in acute distress.     Appearance: She is ill-appearing. She is not toxic-appearing or diaphoretic.   HENT:      Head: Normocephalic and atraumatic.      Nose: Nose normal.      Mouth/Throat:      Mouth: Mucous membranes are moist.   Eyes:      General: No scleral icterus.        Left eye: No discharge.      Extraocular Movements: Extraocular movements intact.      Pupils: Pupils are equal, round, and reactive to light.   Cardiovascular:      Rate and Rhythm: Normal rate and regular rhythm.      Pulses: Normal pulses.      Heart sounds: No gallop.    Pulmonary:      Effort: Respiratory distress present.      Comments: Decrease bs at bases  Abdominal:      General: There is distension.      Palpations: There is no mass.      Tenderness: There is no right CVA tenderness, left CVA tenderness or guarding.      Hernia: No hernia is present.   Musculoskeletal:         General: No swelling, tenderness or deformity. Normal range of motion.      Cervical back: Normal range of motion and neck supple. No rigidity.      Right lower leg: Edema  present.      Left lower leg: Edema present.   Lymphadenopathy:      Cervical: No cervical adenopathy.   Skin:     General: Skin is warm.      Capillary Refill: Capillary refill takes 2 to 3 seconds.      Coloration: Skin is not jaundiced or pale.      Findings: No bruising, erythema, lesion or rash.   Neurological:      General: No focal deficit present.      Mental Status: She is alert and oriented to person, place, and time.      Cranial Nerves: No cranial nerve deficit.      Motor: No weakness.      Gait: Gait normal.   Psychiatric:         Mood and Affect: Mood normal.         Fluids    Intake/Output Summary (Last 24 hours) at 5/1/2021 1418  Last data filed at 5/1/2021 1300  Gross per 24 hour   Intake 360 ml   Output 1950 ml   Net -1590 ml       Laboratory                        Imaging  EC-ECHOCARDIOGRAM COMPLETE W/O CONT   Final Result      DX-CHEST-PORTABLE (1 VIEW)   Final Result      New moderate to severe multifocal consolidation is compatible with Covid pneumonia favored over bacterial pneumonia           Assessment/Plan  * Pneumonia due to COVID-19 virus- (present on admission)  Assessment & Plan  Complicated by acute hypoxic respiratory failure  Cont Decadron   procalcitonin negative  Robitussin and tessalon  Titrate oxygen as tolerable      Decreased urine output  Assessment & Plan  Encourage po fluid intake        Morbidly obese (HCC)- (present on admission)  Assessment & Plan  Contributing to diabetes acute hypoxic respiratory failure  Discussed diet and exercise prior to discharge    Pulmonary HTN (HCC)- (present on admission)  Assessment & Plan  rvsp 55mmhg   Monitor volume status     Pulmonary infiltrates- (present on admission)  Assessment & Plan  Noted on Chest x-ray  Suggestive of covid      RT/O2  Titrate O2    Type 2 diabetes mellitus (HCC)- (present on admission)  Assessment & Plan  Hold home Metformin  Insulin sliding scale   Accu-Cheks      Acute respiratory failure with hypoxia (HCC)-  (present on admission)  Assessment & Plan  Currently on high flow o2 40L 40% fio2   Chronic history of bilateral extremity edema without CHF and on Lasix at home        I's and O's  Daily weights  Echocardiogram results noted  Titrate oxygen as tolerable         VTE prophylaxis: lovenox

## 2021-05-01 NOTE — PROGRESS NOTES
With patient’s permission (if able), completed daily phone call to designated support person, dc Leslie  Discussed patient condition and plan of care. All questions answered.

## 2021-05-01 NOTE — PROGRESS NOTES
Isolation Precautions:    Patient is on  Contact/Droplet/Eye Protection isolation for COVID.    Patient educated on reason for isolation, how the infection may be transmitted, and how to help prevent transmission to others.     Patient educated that Contact/Droplet/Eye Protection precautions involves staff and visitors wearing PPE, follow  Standard Precautions and perform meticulous hand hygiene in order to prevent transmission of infection. (Contact Precautions: gown and gloves; Special Contact Precautions: gown and gloves, with the added requirement of soap and water for hand hygiene; Droplet Precautions: surgical mask worn by staff and visitors in the room, and worn by the patient when out of the room; Airborne Precautions: involves staff wearing PPE to include an N95 Respirator or Controlled Air Purifying Respirator (CAPR).  Visitors should be limited and may wear an N95 mask).         Patient transport and mobilization on unit. Patient educated that they may leave their room, but prior to exiting, the patient needs to have on a fresh patient gown, ensure the potentially infectious area is covered, perform appropriate hand hygiene immediately prior to exiting the room. (*For Airborne Precautions: Patient educated that time out of the room should be minimized and limited to transport to diagnostic procedures or other activities. Patient is to wear surgical mask when required to leave the patient room).

## 2021-05-01 NOTE — CARE PLAN
Problem: Safety  Goal: Will remain free from injury  Outcome: PROGRESSING AS EXPECTED  Note: Pt will remain free from falls during hospital stay     Problem: Knowledge Deficit  Goal: Knowledge of disease process/condition, treatment plan, diagnostic tests, and medications will improve  Outcome: PROGRESSING AS EXPECTED  Note: Pt will understand POC and medications being given and be able to teach back what they are administered for

## 2021-05-02 LAB
GLUCOSE BLD-MCNC: 119 MG/DL (ref 65–99)
GLUCOSE BLD-MCNC: 121 MG/DL (ref 65–99)
GLUCOSE BLD-MCNC: 160 MG/DL (ref 65–99)
GLUCOSE BLD-MCNC: 168 MG/DL (ref 65–99)
GLUCOSE BLD-MCNC: 205 MG/DL (ref 65–99)

## 2021-05-02 PROCEDURE — 700102 HCHG RX REV CODE 250 W/ 637 OVERRIDE(OP): Performed by: HOSPITALIST

## 2021-05-02 PROCEDURE — 94760 N-INVAS EAR/PLS OXIMETRY 1: CPT

## 2021-05-02 PROCEDURE — A9270 NON-COVERED ITEM OR SERVICE: HCPCS | Performed by: HOSPITALIST

## 2021-05-02 PROCEDURE — 770006 HCHG ROOM/CARE - MED/SURG/GYN SEMI*

## 2021-05-02 PROCEDURE — A9270 NON-COVERED ITEM OR SERVICE: HCPCS | Performed by: STUDENT IN AN ORGANIZED HEALTH CARE EDUCATION/TRAINING PROGRAM

## 2021-05-02 PROCEDURE — 700111 HCHG RX REV CODE 636 W/ 250 OVERRIDE (IP): Performed by: STUDENT IN AN ORGANIZED HEALTH CARE EDUCATION/TRAINING PROGRAM

## 2021-05-02 PROCEDURE — 700102 HCHG RX REV CODE 250 W/ 637 OVERRIDE(OP): Performed by: STUDENT IN AN ORGANIZED HEALTH CARE EDUCATION/TRAINING PROGRAM

## 2021-05-02 PROCEDURE — 99232 SBSQ HOSP IP/OBS MODERATE 35: CPT | Performed by: HOSPITALIST

## 2021-05-02 PROCEDURE — 82962 GLUCOSE BLOOD TEST: CPT

## 2021-05-02 PROCEDURE — 94640 AIRWAY INHALATION TREATMENT: CPT

## 2021-05-02 RX ADMIN — ENOXAPARIN SODIUM 40 MG: 40 INJECTION SUBCUTANEOUS at 05:04

## 2021-05-02 RX ADMIN — FUROSEMIDE 40 MG: 40 TABLET ORAL at 05:04

## 2021-05-02 RX ADMIN — ACETAMINOPHEN 1000 MG: 500 TABLET ORAL at 11:36

## 2021-05-02 RX ADMIN — BENZONATATE 100 MG: 100 CAPSULE ORAL at 11:36

## 2021-05-02 RX ADMIN — ACETAMINOPHEN 1000 MG: 500 TABLET ORAL at 16:32

## 2021-05-02 RX ADMIN — DEXAMETHASONE 6 MG: 4 TABLET ORAL at 05:04

## 2021-05-02 RX ADMIN — FUROSEMIDE 40 MG: 40 TABLET ORAL at 16:32

## 2021-05-02 RX ADMIN — ACETAMINOPHEN 1000 MG: 500 TABLET ORAL at 05:05

## 2021-05-02 RX ADMIN — POTASSIUM CHLORIDE 20 MEQ: 1500 TABLET, EXTENDED RELEASE ORAL at 05:04

## 2021-05-02 RX ADMIN — BENZONATATE 100 MG: 100 CAPSULE ORAL at 16:32

## 2021-05-02 RX ADMIN — BENZONATATE 100 MG: 100 CAPSULE ORAL at 05:05

## 2021-05-02 RX ADMIN — INSULIN LISPRO 2 UNITS: 100 INJECTION, SOLUTION INTRAVENOUS; SUBCUTANEOUS at 11:37

## 2021-05-02 ASSESSMENT — ENCOUNTER SYMPTOMS
COUGH: 1
PHOTOPHOBIA: 0
ABDOMINAL PAIN: 0
BACK PAIN: 0
EYE PAIN: 0
CLAUDICATION: 0
SHORTNESS OF BREATH: 1
CHILLS: 0
MYALGIAS: 0
BLURRED VISION: 0
VOMITING: 0
NAUSEA: 0
WEIGHT LOSS: 0
FEVER: 0
HEARTBURN: 0
DOUBLE VISION: 0
PSYCHIATRIC NEGATIVE: 1
NECK PAIN: 0
NEUROLOGICAL NEGATIVE: 1
PALPITATIONS: 0

## 2021-05-02 ASSESSMENT — FIBROSIS 4 INDEX: FIB4 SCORE: 1.14

## 2021-05-02 NOTE — CARE PLAN
Problem: Knowledge Deficit  Goal: Knowledge of disease process/condition, treatment plan, diagnostic tests, and medications will improve  Outcome: PROGRESSING AS EXPECTED  Note: Pt will understand POC and medications and pt will be able to teach back and why they are getting medication     Problem: Respiratory:  Goal: Respiratory status will improve  Outcome: PROGRESSING AS EXPECTED  Note: Educated on IS and to do 10x/hr and verbalized understanding

## 2021-05-02 NOTE — CARE PLAN
Updated pt on plan of care. Waiting on LTAC placement for discharge possibly Monday 5/3. Pt is satisfied with plan of care

## 2021-05-02 NOTE — PROGRESS NOTES
[facility] Medicine Daily Progress Note    Date of Service  5/2/2021    Chief Complaint  50 y.o. female admitted 4/20/2021 with past medical history of chronic lower extremity edema on Lasix presents emergency department on 4/20/2021 with a 2-day history of worsening dyspnea on exertion, and cough.  Patient reports that she has been exposed to her niece who recently tested positive for Covid.  She did get her first Covid dose on 4/10/2021    Hospital Course  At the emergency department, vital signs with tachypnea up to the 30s, hypotension with SBP in the 90s.  Patient requiring 2 L nasal cannula to maintain saturations. CBC with microcytic anemia with elevated RDW, no leukocytosis.  Chemistry with mild hypokalemia, AST 66.  Lactic acid 1.3.  Covid nasopharyngeal swab positive. Chest x-ray showing moderate to severe multifocal consolidation which is compatible with Covid pneumonia. He received one dose of lasix.  Patient was admitted for acute hypoxic respiratory failure secondary to Covid pneumonia.     Interval Problem Update  4/27: Patient remains on high flow oxygen on 40 L, 50% FiO2.  I encouraged ice as ambulation.  We will continue Decadron, azithromycin.  Discontinue Lasix.  4/28: She continues to be on high flow oxygen 40 L at 40%.  Which is improvement since yesterday.  I have encouraged ISS and ambulation.  Continue Decadron and help wean off of high flow oxygen.  4/29: Overall improvement in patient's hypoxia will continue to wean off oxygen as necessary.  Patient is below 6 L of oxygen we can discharge her.  Continue Decadron for now.  4/30: Continue to encourage ISS and ambulation.  We will continue to wean her off oxygen.  Currently the patient is on high flow nasal cannula 40% 40 L.  5/1: Continue to wean off of high flow oxygen  5/2: Patient was placed on oxygen mask at 15 L today.  Consultants/Specialty  Id md    Code Status  Full Code    Disposition  pending    Review of Systems  Review of Systems    Constitutional: Positive for malaise/fatigue. Negative for chills, fever and weight loss.   HENT: Negative for ear pain, hearing loss and tinnitus.    Eyes: Negative for blurred vision, double vision, photophobia and pain.   Respiratory: Positive for cough and shortness of breath.    Cardiovascular: Negative for chest pain, palpitations and claudication.   Gastrointestinal: Negative for abdominal pain, heartburn, nausea and vomiting.   Genitourinary: Negative for dysuria, frequency, hematuria and urgency.   Musculoskeletal: Negative for back pain, joint pain, myalgias and neck pain.   Neurological: Negative.    Psychiatric/Behavioral: Negative.    All other systems reviewed and are negative.       Physical Exam  Temp:  [35.8 °C (96.5 °F)-36.1 °C (97 °F)] 36.1 °C (96.9 °F)  Pulse:  [67-74] 73  Resp:  [19-20] 20  BP: (112-137)/(60-73) 118/65  SpO2:  [89 %-96 %] 89 %    Physical Exam  Vitals reviewed.   Constitutional:       General: She is not in acute distress.     Appearance: She is ill-appearing. She is not toxic-appearing or diaphoretic.   HENT:      Head: Normocephalic and atraumatic.      Nose: Nose normal.      Mouth/Throat:      Mouth: Mucous membranes are moist.   Eyes:      General: No scleral icterus.        Left eye: No discharge.      Extraocular Movements: Extraocular movements intact.      Pupils: Pupils are equal, round, and reactive to light.   Cardiovascular:      Rate and Rhythm: Normal rate and regular rhythm.      Pulses: Normal pulses.      Heart sounds: No gallop.    Pulmonary:      Effort: Respiratory distress present.      Comments: Decrease bs at bases  Abdominal:      General: There is distension.      Palpations: There is no mass.      Tenderness: There is no right CVA tenderness, left CVA tenderness or guarding.      Hernia: No hernia is present.   Musculoskeletal:         General: No swelling, tenderness or deformity. Normal range of motion.      Cervical back: Normal range of motion  and neck supple. No rigidity.      Right lower leg: Edema present.      Left lower leg: Edema present.   Lymphadenopathy:      Cervical: No cervical adenopathy.   Skin:     General: Skin is warm.      Capillary Refill: Capillary refill takes 2 to 3 seconds.      Coloration: Skin is not jaundiced or pale.      Findings: No bruising, erythema, lesion or rash.   Neurological:      General: No focal deficit present.      Mental Status: She is alert and oriented to person, place, and time.      Cranial Nerves: No cranial nerve deficit.      Motor: No weakness.      Gait: Gait normal.   Psychiatric:         Mood and Affect: Mood normal.         Fluids    Intake/Output Summary (Last 24 hours) at 5/2/2021 1306  Last data filed at 5/2/2021 0900  Gross per 24 hour   Intake 360 ml   Output 2000 ml   Net -1640 ml       Laboratory                        Imaging  EC-ECHOCARDIOGRAM COMPLETE W/O CONT   Final Result      DX-CHEST-PORTABLE (1 VIEW)   Final Result      New moderate to severe multifocal consolidation is compatible with Covid pneumonia favored over bacterial pneumonia           Assessment/Plan  * Pneumonia due to COVID-19 virus- (present on admission)  Assessment & Plan  Complicated by acute hypoxic respiratory failure  Cont Decadron   procalcitonin negative  Robitussin and tessalon  Titrate oxygen as tolerable      Decreased urine output  Assessment & Plan  Encourage po fluid intake        Morbidly obese (HCC)- (present on admission)  Assessment & Plan  Contributing to diabetes acute hypoxic respiratory failure  Discussed diet and exercise prior to discharge    Pulmonary HTN (HCC)- (present on admission)  Assessment & Plan  rvsp 55mmhg   Monitor volume status     Pulmonary infiltrates- (present on admission)  Assessment & Plan  Noted on Chest x-ray  Suggestive of covid      RT/O2  Titrate O2    Type 2 diabetes mellitus (HCC)- (present on admission)  Assessment & Plan  Hold home Metformin  Insulin sliding scale    Accu-Cheks      Acute respiratory failure with hypoxia (HCC)- (present on admission)  Assessment & Plan  Placed on oxygen mask 15 L  Chronic history of bilateral extremity edema without CHF and on Lasix at home        I's and O's  Daily weights  Echocardiogram results noted  Titrate oxygen as tolerable         VTE prophylaxis: lovenox

## 2021-05-03 LAB
GLUCOSE BLD-MCNC: 135 MG/DL (ref 65–99)
GLUCOSE BLD-MCNC: 149 MG/DL (ref 65–99)
GLUCOSE BLD-MCNC: 161 MG/DL (ref 65–99)

## 2021-05-03 PROCEDURE — A9270 NON-COVERED ITEM OR SERVICE: HCPCS | Performed by: STUDENT IN AN ORGANIZED HEALTH CARE EDUCATION/TRAINING PROGRAM

## 2021-05-03 PROCEDURE — 94640 AIRWAY INHALATION TREATMENT: CPT

## 2021-05-03 PROCEDURE — 700102 HCHG RX REV CODE 250 W/ 637 OVERRIDE(OP): Performed by: HOSPITALIST

## 2021-05-03 PROCEDURE — A9270 NON-COVERED ITEM OR SERVICE: HCPCS | Performed by: HOSPITALIST

## 2021-05-03 PROCEDURE — 770006 HCHG ROOM/CARE - MED/SURG/GYN SEMI*

## 2021-05-03 PROCEDURE — 700111 HCHG RX REV CODE 636 W/ 250 OVERRIDE (IP): Performed by: STUDENT IN AN ORGANIZED HEALTH CARE EDUCATION/TRAINING PROGRAM

## 2021-05-03 PROCEDURE — 99232 SBSQ HOSP IP/OBS MODERATE 35: CPT | Performed by: HOSPITALIST

## 2021-05-03 PROCEDURE — 700102 HCHG RX REV CODE 250 W/ 637 OVERRIDE(OP): Performed by: STUDENT IN AN ORGANIZED HEALTH CARE EDUCATION/TRAINING PROGRAM

## 2021-05-03 PROCEDURE — 82962 GLUCOSE BLOOD TEST: CPT | Mod: 91

## 2021-05-03 RX ADMIN — FUROSEMIDE 40 MG: 40 TABLET ORAL at 05:38

## 2021-05-03 RX ADMIN — ACETAMINOPHEN 1000 MG: 500 TABLET ORAL at 05:38

## 2021-05-03 RX ADMIN — DEXAMETHASONE 6 MG: 4 TABLET ORAL at 05:38

## 2021-05-03 RX ADMIN — ACETAMINOPHEN 1000 MG: 500 TABLET ORAL at 11:43

## 2021-05-03 RX ADMIN — DOCUSATE SODIUM 50 MG AND SENNOSIDES 8.6 MG 2 TABLET: 8.6; 5 TABLET, FILM COATED ORAL at 17:14

## 2021-05-03 RX ADMIN — ACETAMINOPHEN 1000 MG: 500 TABLET ORAL at 17:13

## 2021-05-03 RX ADMIN — POTASSIUM CHLORIDE 20 MEQ: 1500 TABLET, EXTENDED RELEASE ORAL at 05:38

## 2021-05-03 RX ADMIN — INSULIN LISPRO 2 UNITS: 100 INJECTION, SOLUTION INTRAVENOUS; SUBCUTANEOUS at 12:53

## 2021-05-03 RX ADMIN — BENZONATATE 100 MG: 100 CAPSULE ORAL at 05:38

## 2021-05-03 RX ADMIN — BENZONATATE 100 MG: 100 CAPSULE ORAL at 17:13

## 2021-05-03 RX ADMIN — BENZONATATE 100 MG: 100 CAPSULE ORAL at 11:43

## 2021-05-03 RX ADMIN — ENOXAPARIN SODIUM 40 MG: 40 INJECTION SUBCUTANEOUS at 05:38

## 2021-05-03 RX ADMIN — INSULIN LISPRO 1 UNITS: 100 INJECTION, SOLUTION INTRAVENOUS; SUBCUTANEOUS at 17:12

## 2021-05-03 ASSESSMENT — ENCOUNTER SYMPTOMS
SHORTNESS OF BREATH: 1
NEUROLOGICAL NEGATIVE: 1
MYALGIAS: 0
NAUSEA: 0
NECK PAIN: 0
BLURRED VISION: 0
EYE PAIN: 0
PALPITATIONS: 0
CHILLS: 0
FEVER: 0
HEARTBURN: 0
WEIGHT LOSS: 0
COUGH: 1
BACK PAIN: 0
CLAUDICATION: 0
ABDOMINAL PAIN: 0
DOUBLE VISION: 0
PSYCHIATRIC NEGATIVE: 1
VOMITING: 0
PHOTOPHOBIA: 0

## 2021-05-03 ASSESSMENT — PAIN DESCRIPTION - PAIN TYPE: TYPE: ACUTE PAIN

## 2021-05-03 NOTE — PROGRESS NOTES
Assumed care of patient this AM, pt a/ox4, oxy mask with 15L O2, sats remain at 93%. Pt denies pain, denies SOB. Sitting up in bed eating at this time.

## 2021-05-03 NOTE — CARE PLAN
Problem: Infection  Goal: Will remain free from infection  Outcome: PROGRESSING AS EXPECTED  Pt VSS, afebrile at this time. Requiring 15L OXY MASK     Problem: Discharge Barriers/Planning  Goal: Patient's continuum of care needs will be met  Outcome: PROGRESSING AS EXPECTED  Attempting to wean patient O2.

## 2021-05-03 NOTE — PROGRESS NOTES
[facility] Medicine Daily Progress Note    Date of Service  5/3/2021    Chief Complaint  50 y.o. female admitted 4/20/2021 with past medical history of chronic lower extremity edema on Lasix presents emergency department on 4/20/2021 with a 2-day history of worsening dyspnea on exertion, and cough.  Patient reports that she has been exposed to her niece who recently tested positive for Covid.  She did get her first Covid dose on 4/10/2021    Hospital Course  At the emergency department, vital signs with tachypnea up to the 30s, hypotension with SBP in the 90s.  Patient requiring 2 L nasal cannula to maintain saturations. CBC with microcytic anemia with elevated RDW, no leukocytosis.  Chemistry with mild hypokalemia, AST 66.  Lactic acid 1.3.  Covid nasopharyngeal swab positive. Chest x-ray showing moderate to severe multifocal consolidation which is compatible with Covid pneumonia. He received one dose of lasix.  Patient was admitted for acute hypoxic respiratory failure secondary to Covid pneumonia.     Interval Problem Update  4/27: Patient remains on high flow oxygen on 40 L, 50% FiO2.  I encouraged ice as ambulation.  We will continue Decadron, azithromycin.  Discontinue Lasix.  4/28: She continues to be on high flow oxygen 40 L at 40%.  Which is improvement since yesterday.  I have encouraged ISS and ambulation.  Continue Decadron and help wean off of high flow oxygen.  4/29: Overall improvement in patient's hypoxia will continue to wean off oxygen as necessary.  Patient is below 6 L of oxygen we can discharge her.  Continue Decadron for now.  4/30: Continue to encourage ISS and ambulation.  We will continue to wean her off oxygen.  Currently the patient is on high flow nasal cannula 40% 40 L.  5/1: Continue to wean off of high flow oxygen  5/2: Patient was placed on oxygen mask at 15 L today.  5/3 she continues to be on 15 L of oxy mask we will continue to wean off oxygen.  Consultants/Specialty  Id md    Code  Status  Full Code    Disposition  pending    Review of Systems  Review of Systems   Constitutional: Positive for malaise/fatigue. Negative for chills, fever and weight loss.   HENT: Negative for ear pain, hearing loss and tinnitus.    Eyes: Negative for blurred vision, double vision, photophobia and pain.   Respiratory: Positive for cough and shortness of breath.    Cardiovascular: Negative for chest pain, palpitations and claudication.   Gastrointestinal: Negative for abdominal pain, heartburn, nausea and vomiting.   Genitourinary: Negative for dysuria, frequency, hematuria and urgency.   Musculoskeletal: Negative for back pain, joint pain, myalgias and neck pain.   Neurological: Negative.    Psychiatric/Behavioral: Negative.    All other systems reviewed and are negative.       Physical Exam  Temp:  [35.6 °C (96 °F)-36.6 °C (97.8 °F)] 35.6 °C (96 °F)  Pulse:  [66-80] 78  Resp:  [16-22] 16  BP: (110-138)/(66-84) 110/66  SpO2:  [90 %-95 %] 91 %    Physical Exam  Vitals reviewed.   Constitutional:       General: She is not in acute distress.     Appearance: She is ill-appearing. She is not toxic-appearing or diaphoretic.   HENT:      Head: Normocephalic and atraumatic.      Nose: Nose normal.      Mouth/Throat:      Mouth: Mucous membranes are moist.   Eyes:      General: No scleral icterus.        Left eye: No discharge.      Extraocular Movements: Extraocular movements intact.      Pupils: Pupils are equal, round, and reactive to light.   Cardiovascular:      Rate and Rhythm: Normal rate and regular rhythm.      Pulses: Normal pulses.      Heart sounds: No gallop.    Pulmonary:      Effort: Respiratory distress present.      Comments: Decrease bs at bases  Abdominal:      General: There is distension.      Palpations: There is no mass.      Tenderness: There is no right CVA tenderness, left CVA tenderness or guarding.      Hernia: No hernia is present.   Musculoskeletal:         General: No swelling, tenderness or  deformity. Normal range of motion.      Cervical back: Normal range of motion and neck supple. No rigidity.      Right lower leg: Edema present.      Left lower leg: Edema present.   Lymphadenopathy:      Cervical: No cervical adenopathy.   Skin:     General: Skin is warm.      Capillary Refill: Capillary refill takes 2 to 3 seconds.      Coloration: Skin is not jaundiced or pale.      Findings: No bruising, erythema, lesion or rash.   Neurological:      General: No focal deficit present.      Mental Status: She is alert and oriented to person, place, and time.      Cranial Nerves: No cranial nerve deficit.      Motor: No weakness.      Gait: Gait normal.   Psychiatric:         Mood and Affect: Mood normal.         Fluids    Intake/Output Summary (Last 24 hours) at 5/3/2021 1529  Last data filed at 5/3/2021 1200  Gross per 24 hour   Intake 240 ml   Output 1000 ml   Net -760 ml       Laboratory                        Imaging  EC-ECHOCARDIOGRAM COMPLETE W/O CONT   Final Result      DX-CHEST-PORTABLE (1 VIEW)   Final Result      New moderate to severe multifocal consolidation is compatible with Covid pneumonia favored over bacterial pneumonia           Assessment/Plan  * Pneumonia due to COVID-19 virus- (present on admission)  Assessment & Plan  Complicated by acute hypoxic respiratory failure  Cont Decadron   procalcitonin negative  Robitussin and tessalon  Titrate oxygen as tolerable      Decreased urine output  Assessment & Plan  Encourage po fluid intake        Morbidly obese (HCC)- (present on admission)  Assessment & Plan  Contributing to diabetes acute hypoxic respiratory failure  Discussed diet and exercise prior to discharge    Pulmonary HTN (HCC)- (present on admission)  Assessment & Plan  rvsp 55mmhg   Monitor volume status     Pulmonary infiltrates- (present on admission)  Assessment & Plan  Noted on Chest x-ray  Suggestive of covid      RT/O2  Titrate O2    Type 2 diabetes mellitus (HCC)- (present on  admission)  Assessment & Plan  Hold home Metformin  Insulin sliding scale   Accu-Cheks      Acute respiratory failure with hypoxia (HCC)- (present on admission)  Assessment & Plan  Placed on oxygen mask 15 L  Chronic history of bilateral extremity edema without CHF and on Lasix at home        I's and O's  Daily weights  Echocardiogram results noted  Titrate oxygen as tolerable         VTE prophylaxis: lovenox

## 2021-05-04 LAB
GLUCOSE BLD-MCNC: 128 MG/DL (ref 65–99)
GLUCOSE BLD-MCNC: 217 MG/DL (ref 65–99)

## 2021-05-04 PROCEDURE — 99232 SBSQ HOSP IP/OBS MODERATE 35: CPT | Performed by: INTERNAL MEDICINE

## 2021-05-04 PROCEDURE — A9270 NON-COVERED ITEM OR SERVICE: HCPCS | Performed by: HOSPITALIST

## 2021-05-04 PROCEDURE — 700102 HCHG RX REV CODE 250 W/ 637 OVERRIDE(OP): Performed by: HOSPITALIST

## 2021-05-04 PROCEDURE — 82962 GLUCOSE BLOOD TEST: CPT | Mod: 91

## 2021-05-04 PROCEDURE — 700111 HCHG RX REV CODE 636 W/ 250 OVERRIDE (IP): Performed by: STUDENT IN AN ORGANIZED HEALTH CARE EDUCATION/TRAINING PROGRAM

## 2021-05-04 PROCEDURE — 770006 HCHG ROOM/CARE - MED/SURG/GYN SEMI*

## 2021-05-04 PROCEDURE — A9270 NON-COVERED ITEM OR SERVICE: HCPCS | Performed by: STUDENT IN AN ORGANIZED HEALTH CARE EDUCATION/TRAINING PROGRAM

## 2021-05-04 PROCEDURE — 700102 HCHG RX REV CODE 250 W/ 637 OVERRIDE(OP): Performed by: STUDENT IN AN ORGANIZED HEALTH CARE EDUCATION/TRAINING PROGRAM

## 2021-05-04 RX ORDER — ACETAMINOPHEN 325 MG/1
650 TABLET ORAL EVERY 6 HOURS PRN
Status: DISCONTINUED | OUTPATIENT
Start: 2021-05-04 | End: 2021-05-08 | Stop reason: HOSPADM

## 2021-05-04 RX ADMIN — FUROSEMIDE 40 MG: 40 TABLET ORAL at 05:09

## 2021-05-04 RX ADMIN — BENZONATATE 100 MG: 100 CAPSULE ORAL at 16:59

## 2021-05-04 RX ADMIN — DOCUSATE SODIUM 50 MG AND SENNOSIDES 8.6 MG 2 TABLET: 8.6; 5 TABLET, FILM COATED ORAL at 16:59

## 2021-05-04 RX ADMIN — ACETAMINOPHEN 1000 MG: 500 TABLET ORAL at 11:59

## 2021-05-04 RX ADMIN — POTASSIUM CHLORIDE 20 MEQ: 1500 TABLET, EXTENDED RELEASE ORAL at 05:09

## 2021-05-04 RX ADMIN — ACETAMINOPHEN 1000 MG: 500 TABLET ORAL at 05:09

## 2021-05-04 RX ADMIN — BENZONATATE 100 MG: 100 CAPSULE ORAL at 11:59

## 2021-05-04 RX ADMIN — BENZONATATE 100 MG: 100 CAPSULE ORAL at 05:09

## 2021-05-04 RX ADMIN — ENOXAPARIN SODIUM 40 MG: 40 INJECTION SUBCUTANEOUS at 05:09

## 2021-05-04 ASSESSMENT — ENCOUNTER SYMPTOMS
COUGH: 1
FEVER: 0
VOMITING: 0
ABDOMINAL PAIN: 0
DIZZINESS: 0
DIARRHEA: 0
SHORTNESS OF BREATH: 1

## 2021-05-04 ASSESSMENT — FIBROSIS 4 INDEX: FIB4 SCORE: 1.14

## 2021-05-04 ASSESSMENT — PAIN DESCRIPTION - PAIN TYPE: TYPE: ACUTE PAIN

## 2021-05-04 NOTE — PROGRESS NOTES
Hospital Medicine Daily Progress Note    Date of Service  5/4/2021    Chief Complaint  50 y.o. female admitted 4/20/2021 with SOB    Hospital Course  49 yo woman with chronic leg edema on Lasix who presented with 2 days of worsening shortness of breath.  Patient got her first Covid vaccine 4/10, however she was exposed to her niece who is Covid positive.  Patient tested positive for Covid and chest x-ray showed new multifocal consolidation and admitted for hypoxia needing high flow oxygen.  She was started on Decadron and given dose of Tocilizumab.  TTE showed pulmonary hypertension.  She was also given 3 days of azithromycin and Lasix.    Interval Problem Update  Her O2 needs are weaning, currently 6 L oxymask she says her shortness of breath is improving    Consultants/Specialty  ID    Code Status  Full Code    Disposition  Pending improved oxygenation    Review of Systems  Review of Systems   Constitutional: Negative for fever.   Respiratory: Positive for cough and shortness of breath.    Cardiovascular: Negative for chest pain.   Gastrointestinal: Negative for abdominal pain, diarrhea and vomiting.   Neurological: Negative for dizziness.   All other systems reviewed and are negative.       Physical Exam  Temp:  [35.8 °C (96.5 °F)-36.3 °C (97.3 °F)] 36.1 °C (97 °F)  Pulse:  [59-64] 60  Resp:  [18] 18  BP: (103-115)/(59-69) 103/59  SpO2:  [93 %-96 %] 94 %    Physical Exam  Vitals and nursing note reviewed.   Constitutional:       General: She is not in acute distress.     Appearance: She is not toxic-appearing or diaphoretic.   HENT:      Head: Normocephalic.      Mouth/Throat:      Mouth: Mucous membranes are moist.   Eyes:      General:         Right eye: No discharge.         Left eye: No discharge.   Cardiovascular:      Rate and Rhythm: Normal rate and regular rhythm.   Pulmonary:      Effort: No respiratory distress.      Breath sounds: Rales present. No wheezing.   Abdominal:      Palpations: Abdomen is  soft.      Tenderness: There is no abdominal tenderness.   Musculoskeletal:      Cervical back: Neck supple.      Comments: No pitting edema   Skin:     General: Skin is warm and dry.   Neurological:      Mental Status: She is alert.      Comments: AOx4   Psychiatric:         Mood and Affect: Mood normal.         Behavior: Behavior normal.         Fluids    Intake/Output Summary (Last 24 hours) at 5/4/2021 1344  Last data filed at 5/4/2021 0900  Gross per 24 hour   Intake 240 ml   Output 800 ml   Net -560 ml       Laboratory                        Imaging  EC-ECHOCARDIOGRAM COMPLETE W/O CONT   Final Result      DX-CHEST-PORTABLE (1 VIEW)   Final Result      New moderate to severe multifocal consolidation is compatible with Covid pneumonia favored over bacterial pneumonia           Assessment/Plan  * Pneumonia due to COVID-19 virus- (present on admission)  Assessment & Plan  Complicated by acute hypoxic respiratory failure  Received Decadron and Tocilizumab  Robitussin and tessalon  Titrate oxygen as tolerable      Acute respiratory failure with hypoxia (HCC)- (present on admission)  Assessment & Plan  Received Decadron, Tocilizumab, azithromycin  He appears euvolemic, hold Lasix  No longer on high flow nasal cannula.  Continue weaning O2, I-S, ambulation      Decreased urine output  Assessment & Plan  Encourage po fluid intake        Morbidly obese (HCC)- (present on admission)  Assessment & Plan  Contributing to diabetes acute hypoxic respiratory failure  Discussed diet and exercise prior to discharge    Pulmonary HTN (HCC)- (present on admission)  Assessment & Plan  rvsp 55mmhg   Monitor volume status     Pulmonary infiltrates- (present on admission)  Assessment & Plan  Noted on Chest x-ray  Suggestive of covid      RT/O2  Titrate O2    Type 2 diabetes mellitus (HCC)- (present on admission)  Assessment & Plan  Hold home Metformin  Insulin sliding scale   Accu-Cheks  Glucose is controlled       VTE prophylaxis:  lovenox

## 2021-05-04 NOTE — CARE PLAN
Problem: Communication  Goal: The ability to communicate needs accurately and effectively will improve  Outcome: PROGRESSING AS EXPECTED  Whiteboard updated. Pt updated on POC. All questions answered.     Problem: Safety  Goal: Will remain free from injury  Outcome: PROGRESSING AS EXPECTED  Goal: Will remain free from falls  Outcome: PROGRESSING AS EXPECTED  Fall precautions in place. Call light within reach.     Problem: Infection  Goal: Will remain free from infection  Outcome: PROGRESSING AS EXPECTED   Pt able to wean down to 6L O2 mask so far. Will continue to wean O2 as tolerated.

## 2021-05-04 NOTE — CARE PLAN
Problem: Venous Thromboembolism (VTW)/Deep Vein Thrombosis (DVT) Prevention:  Goal: Patient will participate in Venous Thrombosis (VTE)/Deep Vein Thrombosis (DVT)Prevention Measures  Outcome: PROGRESSING AS EXPECTED  Note: Patient getting lovenox shots for DVT prevention.     Problem: Respiratory:  Goal: Respiratory status will improve  Outcome: PROGRESSING AS EXPECTED  Note: Patient remains stable and was able to be weaned to 10L oxymask.

## 2021-05-05 LAB
ANION GAP SERPL CALC-SCNC: 9 MMOL/L (ref 7–16)
BASOPHILS # BLD AUTO: 0.3 % (ref 0–1.8)
BASOPHILS # BLD: 0.03 K/UL (ref 0–0.12)
BUN SERPL-MCNC: 24 MG/DL (ref 8–22)
CALCIUM SERPL-MCNC: 9.6 MG/DL (ref 8.5–10.5)
CHLORIDE SERPL-SCNC: 100 MMOL/L (ref 96–112)
CO2 SERPL-SCNC: 29 MMOL/L (ref 20–33)
CREAT SERPL-MCNC: 0.42 MG/DL (ref 0.5–1.4)
EOSINOPHIL # BLD AUTO: 0.12 K/UL (ref 0–0.51)
EOSINOPHIL NFR BLD: 1.2 % (ref 0–6.9)
ERYTHROCYTE [DISTWIDTH] IN BLOOD BY AUTOMATED COUNT: 55.9 FL (ref 35.9–50)
GLUCOSE BLD-MCNC: 116 MG/DL (ref 65–99)
GLUCOSE BLD-MCNC: 116 MG/DL (ref 65–99)
GLUCOSE BLD-MCNC: 122 MG/DL (ref 65–99)
GLUCOSE BLD-MCNC: 125 MG/DL (ref 65–99)
GLUCOSE BLD-MCNC: 133 MG/DL (ref 65–99)
GLUCOSE BLD-MCNC: 139 MG/DL (ref 65–99)
GLUCOSE BLD-MCNC: 146 MG/DL (ref 65–99)
GLUCOSE SERPL-MCNC: 183 MG/DL (ref 65–99)
HCT VFR BLD AUTO: 37.7 % (ref 37–47)
HGB BLD-MCNC: 11.1 G/DL (ref 12–16)
IMM GRANULOCYTES # BLD AUTO: 0.04 K/UL (ref 0–0.11)
IMM GRANULOCYTES NFR BLD AUTO: 0.4 % (ref 0–0.9)
LYMPHOCYTES # BLD AUTO: 2.47 K/UL (ref 1–4.8)
LYMPHOCYTES NFR BLD: 23.8 % (ref 22–41)
MCH RBC QN AUTO: 23 PG (ref 27–33)
MCHC RBC AUTO-ENTMCNC: 29.4 G/DL (ref 33.6–35)
MCV RBC AUTO: 78.1 FL (ref 81.4–97.8)
MONOCYTES # BLD AUTO: 1.22 K/UL (ref 0–0.85)
MONOCYTES NFR BLD AUTO: 11.8 % (ref 0–13.4)
NEUTROPHILS # BLD AUTO: 6.49 K/UL (ref 2–7.15)
NEUTROPHILS NFR BLD: 62.5 % (ref 44–72)
NRBC # BLD AUTO: 0 K/UL
NRBC BLD-RTO: 0 /100 WBC
PLATELET # BLD AUTO: 293 K/UL (ref 164–446)
PMV BLD AUTO: 9.7 FL (ref 9–12.9)
POTASSIUM SERPL-SCNC: 4 MMOL/L (ref 3.6–5.5)
RBC # BLD AUTO: 4.83 M/UL (ref 4.2–5.4)
SODIUM SERPL-SCNC: 138 MMOL/L (ref 135–145)
WBC # BLD AUTO: 10.4 K/UL (ref 4.8–10.8)

## 2021-05-05 PROCEDURE — A9270 NON-COVERED ITEM OR SERVICE: HCPCS | Performed by: INTERNAL MEDICINE

## 2021-05-05 PROCEDURE — 82962 GLUCOSE BLOOD TEST: CPT | Mod: 91

## 2021-05-05 PROCEDURE — 80048 BASIC METABOLIC PNL TOTAL CA: CPT

## 2021-05-05 PROCEDURE — 36415 COLL VENOUS BLD VENIPUNCTURE: CPT

## 2021-05-05 PROCEDURE — A9270 NON-COVERED ITEM OR SERVICE: HCPCS | Performed by: HOSPITALIST

## 2021-05-05 PROCEDURE — 700102 HCHG RX REV CODE 250 W/ 637 OVERRIDE(OP): Performed by: INTERNAL MEDICINE

## 2021-05-05 PROCEDURE — 700111 HCHG RX REV CODE 636 W/ 250 OVERRIDE (IP): Performed by: STUDENT IN AN ORGANIZED HEALTH CARE EDUCATION/TRAINING PROGRAM

## 2021-05-05 PROCEDURE — 85025 COMPLETE CBC W/AUTO DIFF WBC: CPT

## 2021-05-05 PROCEDURE — A9270 NON-COVERED ITEM OR SERVICE: HCPCS | Performed by: STUDENT IN AN ORGANIZED HEALTH CARE EDUCATION/TRAINING PROGRAM

## 2021-05-05 PROCEDURE — 700102 HCHG RX REV CODE 250 W/ 637 OVERRIDE(OP): Performed by: HOSPITALIST

## 2021-05-05 PROCEDURE — 770006 HCHG ROOM/CARE - MED/SURG/GYN SEMI*

## 2021-05-05 PROCEDURE — 99232 SBSQ HOSP IP/OBS MODERATE 35: CPT | Performed by: INTERNAL MEDICINE

## 2021-05-05 PROCEDURE — 700102 HCHG RX REV CODE 250 W/ 637 OVERRIDE(OP): Performed by: STUDENT IN AN ORGANIZED HEALTH CARE EDUCATION/TRAINING PROGRAM

## 2021-05-05 RX ADMIN — ACETAMINOPHEN 650 MG: 325 TABLET, FILM COATED ORAL at 11:48

## 2021-05-05 RX ADMIN — ACETAMINOPHEN 650 MG: 325 TABLET, FILM COATED ORAL at 20:41

## 2021-05-05 RX ADMIN — DOCUSATE SODIUM 50 MG AND SENNOSIDES 8.6 MG 2 TABLET: 8.6; 5 TABLET, FILM COATED ORAL at 05:34

## 2021-05-05 RX ADMIN — ENOXAPARIN SODIUM 40 MG: 40 INJECTION SUBCUTANEOUS at 05:34

## 2021-05-05 RX ADMIN — BENZONATATE 100 MG: 100 CAPSULE ORAL at 05:33

## 2021-05-05 RX ADMIN — BENZONATATE 100 MG: 100 CAPSULE ORAL at 11:44

## 2021-05-05 RX ADMIN — BENZONATATE 100 MG: 100 CAPSULE ORAL at 16:45

## 2021-05-05 ASSESSMENT — ENCOUNTER SYMPTOMS
FEVER: 0
COUGH: 1
SHORTNESS OF BREATH: 1
DIARRHEA: 0
VOMITING: 0
ABDOMINAL PAIN: 0
DIZZINESS: 0

## 2021-05-05 NOTE — PROGRESS NOTES
Hospital Medicine Daily Progress Note    Date of Service  5/5/2021    Chief Complaint  50 y.o. female admitted 4/20/2021 with SOB    Hospital Course  49 yo woman with chronic leg edema on Lasix who presented with 2 days of worsening shortness of breath.  Patient got her first Covid vaccine 4/10, however she was exposed to her niece who is Covid positive.  Patient tested positive for Covid and chest x-ray showed new multifocal consolidation and admitted for hypoxia needing high flow oxygen.  She was started on Decadron and given dose of Tocilizumab.  TTE showed pulmonary hypertension.  She was also given 3 days of azithromycin and Lasix.    Interval Problem Update  Her O2 needs are weaning, but needs increased O2 with ambulation  Says she feels better, SOB and cough improved    Consultants/Specialty  ID    Code Status  Full Code    Disposition  Pending improved oxygenation    Review of Systems  Review of Systems   Constitutional: Negative for fever.   Respiratory: Positive for cough and shortness of breath.    Cardiovascular: Negative for chest pain.   Gastrointestinal: Negative for abdominal pain, diarrhea and vomiting.   Neurological: Negative for dizziness.   All other systems reviewed and are negative.       Physical Exam  Temp:  [36 °C (96.8 °F)-36.4 °C (97.5 °F)] 36.4 °C (97.5 °F)  Pulse:  [63-70] 70  Resp:  [18-19] 19  BP: (110-116)/(67-71) 116/71  SpO2:  [92 %-94 %] 94 %    Physical Exam  Vitals and nursing note reviewed.   Constitutional:       General: She is not in acute distress.     Appearance: She is not toxic-appearing or diaphoretic.   HENT:      Head: Normocephalic.      Mouth/Throat:      Mouth: Mucous membranes are moist.   Eyes:      General:         Right eye: No discharge.         Left eye: No discharge.   Cardiovascular:      Rate and Rhythm: Normal rate and regular rhythm.   Pulmonary:      Effort: No respiratory distress.      Breath sounds: No wheezing or rales.   Abdominal:      Palpations:  Abdomen is soft.      Tenderness: There is no abdominal tenderness.   Musculoskeletal:      Cervical back: Neck supple.      Comments: No pitting edema   Skin:     General: Skin is warm and dry.   Neurological:      Mental Status: She is alert.      Comments: AOx4   Psychiatric:         Mood and Affect: Mood normal.         Behavior: Behavior normal.         Fluids    Intake/Output Summary (Last 24 hours) at 5/5/2021 1328  Last data filed at 5/5/2021 1000  Gross per 24 hour   Intake 690 ml   Output 700 ml   Net -10 ml       Laboratory  Recent Labs     05/05/21  0153   WBC 10.4   RBC 4.83   HEMOGLOBIN 11.1*   HEMATOCRIT 37.7   MCV 78.1*   MCH 23.0*   MCHC 29.4*   RDW 55.9*   PLATELETCT 293   MPV 9.7     Recent Labs     05/05/21  0153   SODIUM 138   POTASSIUM 4.0   CHLORIDE 100   CO2 29   GLUCOSE 183*   BUN 24*   CREATININE 0.42*   CALCIUM 9.6                   Imaging  EC-ECHOCARDIOGRAM COMPLETE W/O CONT   Final Result      DX-CHEST-PORTABLE (1 VIEW)   Final Result      New moderate to severe multifocal consolidation is compatible with Covid pneumonia favored over bacterial pneumonia           Assessment/Plan  * Pneumonia due to COVID-19 virus- (present on admission)  Assessment & Plan  Complicated by acute hypoxic respiratory failure  Received Decadron and Tocilizumab  Robitussin and tessalon  Titrate oxygen as tolerable      Acute respiratory failure with hypoxia (HCC)- (present on admission)  Assessment & Plan  Received Decadron, Tocilizumab, azithromycin  Appears euvolemic, hold Lasix  No longer on high flow nasal cannula.    Continue weaning O2, I-S, ambulation      Decreased urine output  Assessment & Plan  Encourage po fluid intake        Morbidly obese (HCC)- (present on admission)  Assessment & Plan  Contributing to diabetes acute hypoxic respiratory failure  Discussed diet and exercise prior to discharge    Pulmonary HTN (HCC)- (present on admission)  Assessment & Plan  rvsp 55mmhg   Monitor volume status      Pulmonary infiltrates- (present on admission)  Assessment & Plan  Noted on Chest x-ray  Suggestive of covid      RT/O2  Titrate O2    Type 2 diabetes mellitus (HCC)- (present on admission)  Assessment & Plan  Hold home Metformin  Insulin sliding scale   Accu-Cheks  Glucose is controlled       VTE prophylaxis: lovenox

## 2021-05-06 LAB
GLUCOSE BLD-MCNC: 140 MG/DL (ref 65–99)
GLUCOSE BLD-MCNC: 153 MG/DL (ref 65–99)
GLUCOSE BLD-MCNC: 156 MG/DL (ref 65–99)

## 2021-05-06 PROCEDURE — 82962 GLUCOSE BLOOD TEST: CPT | Mod: 91

## 2021-05-06 PROCEDURE — 700102 HCHG RX REV CODE 250 W/ 637 OVERRIDE(OP): Performed by: HOSPITALIST

## 2021-05-06 PROCEDURE — A9270 NON-COVERED ITEM OR SERVICE: HCPCS | Performed by: HOSPITALIST

## 2021-05-06 PROCEDURE — 770006 HCHG ROOM/CARE - MED/SURG/GYN SEMI*

## 2021-05-06 PROCEDURE — A9270 NON-COVERED ITEM OR SERVICE: HCPCS | Performed by: STUDENT IN AN ORGANIZED HEALTH CARE EDUCATION/TRAINING PROGRAM

## 2021-05-06 PROCEDURE — 99232 SBSQ HOSP IP/OBS MODERATE 35: CPT | Performed by: INTERNAL MEDICINE

## 2021-05-06 PROCEDURE — 700102 HCHG RX REV CODE 250 W/ 637 OVERRIDE(OP): Performed by: STUDENT IN AN ORGANIZED HEALTH CARE EDUCATION/TRAINING PROGRAM

## 2021-05-06 PROCEDURE — 700111 HCHG RX REV CODE 636 W/ 250 OVERRIDE (IP): Performed by: STUDENT IN AN ORGANIZED HEALTH CARE EDUCATION/TRAINING PROGRAM

## 2021-05-06 RX ADMIN — INSULIN LISPRO 1 UNITS: 100 INJECTION, SOLUTION INTRAVENOUS; SUBCUTANEOUS at 11:26

## 2021-05-06 RX ADMIN — INSULIN LISPRO 1 UNITS: 100 INJECTION, SOLUTION INTRAVENOUS; SUBCUTANEOUS at 20:11

## 2021-05-06 RX ADMIN — IBUPROFEN 600 MG: 600 TABLET ORAL at 14:52

## 2021-05-06 RX ADMIN — BENZONATATE 100 MG: 100 CAPSULE ORAL at 04:30

## 2021-05-06 RX ADMIN — BENZONATATE 100 MG: 100 CAPSULE ORAL at 16:53

## 2021-05-06 RX ADMIN — BENZONATATE 100 MG: 100 CAPSULE ORAL at 11:25

## 2021-05-06 RX ADMIN — ENOXAPARIN SODIUM 40 MG: 40 INJECTION SUBCUTANEOUS at 04:30

## 2021-05-06 RX ADMIN — INSULIN LISPRO 1 UNITS: 100 INJECTION, SOLUTION INTRAVENOUS; SUBCUTANEOUS at 16:54

## 2021-05-06 ASSESSMENT — ENCOUNTER SYMPTOMS
DIZZINESS: 0
COUGH: 1
SHORTNESS OF BREATH: 1
WEAKNESS: 1
FEVER: 0
VOMITING: 0
DIARRHEA: 0
ABDOMINAL PAIN: 0

## 2021-05-06 NOTE — CARE PLAN
Problem: Safety  Goal: Will remain free from falls  Outcome: PROGRESSING AS EXPECTED   Pt is aaox4 and up self.  On 4l oxygen.  PT educated to contact staff if ever feels too weak or SOB to ambulate.  Pt verbalized understanding.  Will continue to monitor

## 2021-05-06 NOTE — CARE PLAN
Problem: Safety  Goal: Will remain free from injury  Outcome: PROGRESSING AS EXPECTED  Goal: Will remain free from falls  Outcome: PROGRESSING AS EXPECTED     Problem: Pain Management  Goal: Pain level will decrease to patient's comfort goal  Outcome: PROGRESSING AS EXPECTED  Note: Patient medicated per MAR     Problem: Respiratory:  Goal: Respiratory status will improve  Outcome: PROGRESSING AS EXPECTED  Note: Titrate O2 as appropriately      Problem: Mobility  Goal: Risk for activity intolerance will decrease  Outcome: PROGRESSING AS EXPECTED

## 2021-05-06 NOTE — PROGRESS NOTES
Hospital Medicine Daily Progress Note    Date of Service  5/6/2021    Chief Complaint  50 y.o. female admitted 4/20/2021 with SOB    Hospital Course  51 yo woman with chronic leg edema on Lasix who presented with 2 days of worsening shortness of breath.  Patient got her first Covid vaccine 4/10, however she was exposed to her niece who is Covid positive.  Patient tested positive for Covid and chest x-ray showed new multifocal consolidation and admitted for hypoxia needing high flow oxygen.  She was started on Decadron and given dose of Tocilizumab.  TTE showed pulmonary hypertension.  She was also given 3 days of azithromycin and Lasix.    Interval Problem Update  She feels stronger, SOB improved but feels SOB with ambulation. Remains on 7L O2    Consultants/Specialty  ID    Code Status  Full Code    Disposition  Pending improved oxygenation    Review of Systems  Review of Systems   Constitutional: Positive for malaise/fatigue. Negative for fever.   Respiratory: Positive for cough and shortness of breath.    Cardiovascular: Negative for chest pain.   Gastrointestinal: Negative for abdominal pain, diarrhea and vomiting.   Neurological: Positive for weakness. Negative for dizziness.   All other systems reviewed and are negative.       Physical Exam  Temp:  [35.8 °C (96.5 °F)-36.4 °C (97.6 °F)] 36.4 °C (97.6 °F)  Pulse:  [75-80] 75  Resp:  [18-19] 18  BP: (110-130)/(66-75) 130/75  SpO2:  [95 %-97 %] 95 %    Physical Exam  Vitals and nursing note reviewed.   Constitutional:       General: She is not in acute distress.     Appearance: She is not toxic-appearing or diaphoretic.   HENT:      Head: Normocephalic.      Mouth/Throat:      Mouth: Mucous membranes are moist.   Eyes:      General:         Right eye: No discharge.         Left eye: No discharge.   Cardiovascular:      Rate and Rhythm: Normal rate and regular rhythm.   Pulmonary:      Effort: No respiratory distress.      Breath sounds: No wheezing or rales.       Comments: Poor inspiratory effort  Abdominal:      Palpations: Abdomen is soft.      Tenderness: There is no abdominal tenderness.   Musculoskeletal:      Cervical back: Neck supple.      Comments: No pitting edema   Skin:     General: Skin is warm and dry.   Neurological:      Mental Status: She is alert.      Comments: AOx4   Psychiatric:         Mood and Affect: Mood normal.         Behavior: Behavior normal.         Fluids    Intake/Output Summary (Last 24 hours) at 5/6/2021 1459  Last data filed at 5/6/2021 0808  Gross per 24 hour   Intake 720 ml   Output --   Net 720 ml       Laboratory  Recent Labs     05/05/21  0153   WBC 10.4   RBC 4.83   HEMOGLOBIN 11.1*   HEMATOCRIT 37.7   MCV 78.1*   MCH 23.0*   MCHC 29.4*   RDW 55.9*   PLATELETCT 293   MPV 9.7     Recent Labs     05/05/21  0153   SODIUM 138   POTASSIUM 4.0   CHLORIDE 100   CO2 29   GLUCOSE 183*   BUN 24*   CREATININE 0.42*   CALCIUM 9.6                   Imaging  EC-ECHOCARDIOGRAM COMPLETE W/O CONT   Final Result      DX-CHEST-PORTABLE (1 VIEW)   Final Result      New moderate to severe multifocal consolidation is compatible with Covid pneumonia favored over bacterial pneumonia           Assessment/Plan  * Pneumonia due to COVID-19 virus- (present on admission)  Assessment & Plan  Complicated by acute hypoxic respiratory failure  Received Decadron and Tocilizumab  Robitussin and tessalon  Titrate oxygen as tolerable      Acute respiratory failure with hypoxia (HCC)- (present on admission)  Assessment & Plan  Received Decadron, Tocilizumab, azithromycin  No longer on high flow nasal cannula.    Continue weaning O2, I-S, ambulation      Decreased urine output  Assessment & Plan  Encourage po fluid intake        Morbidly obese (HCC)- (present on admission)  Assessment & Plan  Contributing to diabetes acute hypoxic respiratory failure  Discussed diet and exercise prior to discharge    Pulmonary HTN (HCC)- (present on admission)  Assessment & Plan  rvsp  55mmhg   Monitor volume status     Pulmonary infiltrates- (present on admission)  Assessment & Plan  Noted on Chest x-ray  Suggestive of covid      RT/O2  Titrate O2    Type 2 diabetes mellitus (HCC)- (present on admission)  Assessment & Plan  Hold home Metformin  Insulin sliding scale   Accu-Cheks  Glucose is controlled       VTE prophylaxis: lovenox

## 2021-05-07 PROBLEM — R34 DECREASED URINE OUTPUT: Status: RESOLVED | Noted: 2021-04-22 | Resolved: 2021-05-07

## 2021-05-07 LAB
ANION GAP SERPL CALC-SCNC: 9 MMOL/L (ref 7–16)
ANISOCYTOSIS BLD QL SMEAR: ABNORMAL
BASOPHILS # BLD AUTO: 0.3 % (ref 0–1.8)
BASOPHILS # BLD: 0.02 K/UL (ref 0–0.12)
BUN SERPL-MCNC: 11 MG/DL (ref 8–22)
CALCIUM SERPL-MCNC: 9.6 MG/DL (ref 8.5–10.5)
CHLORIDE SERPL-SCNC: 103 MMOL/L (ref 96–112)
CO2 SERPL-SCNC: 28 MMOL/L (ref 20–33)
COMMENT 1642: NORMAL
CREAT SERPL-MCNC: 0.38 MG/DL (ref 0.5–1.4)
EOSINOPHIL # BLD AUTO: 0.39 K/UL (ref 0–0.51)
EOSINOPHIL NFR BLD: 5.8 % (ref 0–6.9)
ERYTHROCYTE [DISTWIDTH] IN BLOOD BY AUTOMATED COUNT: 57.4 FL (ref 35.9–50)
GLUCOSE BLD-MCNC: 140 MG/DL (ref 65–99)
GLUCOSE BLD-MCNC: 157 MG/DL (ref 65–99)
GLUCOSE BLD-MCNC: 163 MG/DL (ref 65–99)
GLUCOSE BLD-MCNC: 189 MG/DL (ref 65–99)
GLUCOSE BLD-MCNC: 190 MG/DL (ref 65–99)
GLUCOSE SERPL-MCNC: 145 MG/DL (ref 65–99)
HCT VFR BLD AUTO: 36.9 % (ref 37–47)
HGB BLD-MCNC: 10.7 G/DL (ref 12–16)
HYPOCHROMIA BLD QL SMEAR: ABNORMAL
IMM GRANULOCYTES # BLD AUTO: 0.03 K/UL (ref 0–0.11)
IMM GRANULOCYTES NFR BLD AUTO: 0.4 % (ref 0–0.9)
LYMPHOCYTES # BLD AUTO: 1.87 K/UL (ref 1–4.8)
LYMPHOCYTES NFR BLD: 28 % (ref 22–41)
MCH RBC QN AUTO: 22.9 PG (ref 27–33)
MCHC RBC AUTO-ENTMCNC: 29 G/DL (ref 33.6–35)
MCV RBC AUTO: 78.8 FL (ref 81.4–97.8)
MICROCYTES BLD QL SMEAR: ABNORMAL
MONOCYTES # BLD AUTO: 0.87 K/UL (ref 0–0.85)
MONOCYTES NFR BLD AUTO: 13 % (ref 0–13.4)
MORPHOLOGY BLD-IMP: NORMAL
NEUTROPHILS # BLD AUTO: 3.51 K/UL (ref 2–7.15)
NEUTROPHILS NFR BLD: 52.5 % (ref 44–72)
NRBC # BLD AUTO: 0 K/UL
NRBC BLD-RTO: 0 /100 WBC
PLATELET # BLD AUTO: 242 K/UL (ref 164–446)
PLATELET BLD QL SMEAR: NORMAL
PMV BLD AUTO: 10.2 FL (ref 9–12.9)
POLYCHROMASIA BLD QL SMEAR: NORMAL
POTASSIUM SERPL-SCNC: 3.9 MMOL/L (ref 3.6–5.5)
RBC # BLD AUTO: 4.68 M/UL (ref 4.2–5.4)
RBC BLD AUTO: PRESENT
SODIUM SERPL-SCNC: 140 MMOL/L (ref 135–145)
WBC # BLD AUTO: 6.7 K/UL (ref 4.8–10.8)

## 2021-05-07 PROCEDURE — 80048 BASIC METABOLIC PNL TOTAL CA: CPT

## 2021-05-07 PROCEDURE — A9270 NON-COVERED ITEM OR SERVICE: HCPCS | Performed by: STUDENT IN AN ORGANIZED HEALTH CARE EDUCATION/TRAINING PROGRAM

## 2021-05-07 PROCEDURE — 85025 COMPLETE CBC W/AUTO DIFF WBC: CPT

## 2021-05-07 PROCEDURE — A9270 NON-COVERED ITEM OR SERVICE: HCPCS | Performed by: HOSPITALIST

## 2021-05-07 PROCEDURE — 700102 HCHG RX REV CODE 250 W/ 637 OVERRIDE(OP): Performed by: HOSPITALIST

## 2021-05-07 PROCEDURE — 770006 HCHG ROOM/CARE - MED/SURG/GYN SEMI*

## 2021-05-07 PROCEDURE — 99232 SBSQ HOSP IP/OBS MODERATE 35: CPT | Performed by: INTERNAL MEDICINE

## 2021-05-07 PROCEDURE — 700102 HCHG RX REV CODE 250 W/ 637 OVERRIDE(OP): Performed by: INTERNAL MEDICINE

## 2021-05-07 PROCEDURE — 700102 HCHG RX REV CODE 250 W/ 637 OVERRIDE(OP): Performed by: STUDENT IN AN ORGANIZED HEALTH CARE EDUCATION/TRAINING PROGRAM

## 2021-05-07 PROCEDURE — 36415 COLL VENOUS BLD VENIPUNCTURE: CPT

## 2021-05-07 PROCEDURE — A9270 NON-COVERED ITEM OR SERVICE: HCPCS | Performed by: INTERNAL MEDICINE

## 2021-05-07 PROCEDURE — 700111 HCHG RX REV CODE 636 W/ 250 OVERRIDE (IP): Performed by: STUDENT IN AN ORGANIZED HEALTH CARE EDUCATION/TRAINING PROGRAM

## 2021-05-07 PROCEDURE — 82962 GLUCOSE BLOOD TEST: CPT | Mod: 91

## 2021-05-07 RX ORDER — FUROSEMIDE 40 MG/1
40 TABLET ORAL
Status: DISCONTINUED | OUTPATIENT
Start: 2021-05-07 | End: 2021-05-08 | Stop reason: HOSPADM

## 2021-05-07 RX ADMIN — ENOXAPARIN SODIUM 40 MG: 40 INJECTION SUBCUTANEOUS at 05:48

## 2021-05-07 RX ADMIN — INSULIN LISPRO 1 UNITS: 100 INJECTION, SOLUTION INTRAVENOUS; SUBCUTANEOUS at 16:38

## 2021-05-07 RX ADMIN — BENZONATATE 100 MG: 100 CAPSULE ORAL at 05:48

## 2021-05-07 RX ADMIN — IBUPROFEN 600 MG: 600 TABLET ORAL at 16:38

## 2021-05-07 RX ADMIN — FUROSEMIDE 40 MG: 40 TABLET ORAL at 12:55

## 2021-05-07 RX ADMIN — INSULIN LISPRO 1 UNITS: 100 INJECTION, SOLUTION INTRAVENOUS; SUBCUTANEOUS at 11:49

## 2021-05-07 RX ADMIN — BENZONATATE 100 MG: 100 CAPSULE ORAL at 11:41

## 2021-05-07 RX ADMIN — INSULIN LISPRO 1 UNITS: 100 INJECTION, SOLUTION INTRAVENOUS; SUBCUTANEOUS at 21:09

## 2021-05-07 RX ADMIN — BENZONATATE 100 MG: 100 CAPSULE ORAL at 16:37

## 2021-05-07 ASSESSMENT — ENCOUNTER SYMPTOMS
WEAKNESS: 1
ABDOMINAL PAIN: 0
DIARRHEA: 0
SHORTNESS OF BREATH: 1
COUGH: 1
DIZZINESS: 0
VOMITING: 0

## 2021-05-07 ASSESSMENT — GAIT ASSESSMENTS
GAIT LEVEL OF ASSIST: SUPERVISED
DISTANCE (FEET): 30
DEVIATION: OTHER (COMMENT)

## 2021-05-07 ASSESSMENT — COGNITIVE AND FUNCTIONAL STATUS - GENERAL
MOBILITY SCORE: 20
MOVING TO AND FROM BED TO CHAIR: A LITTLE
MOVING FROM LYING ON BACK TO SITTING ON SIDE OF FLAT BED: A LITTLE
TURNING FROM BACK TO SIDE WHILE IN FLAT BAD: A LITTLE
CLIMB 3 TO 5 STEPS WITH RAILING: A LITTLE
SUGGESTED CMS G CODE MODIFIER MOBILITY: CJ

## 2021-05-07 NOTE — PROGRESS NOTES
Hospital Medicine Daily Progress Note    Date of Service  5/7/2021    Chief Complaint  50 y.o. female admitted 4/20/2021 with SOB    Hospital Course  51 yo woman with chronic leg edema on Lasix who presented with 2 days of worsening shortness of breath.  Patient got her first Covid vaccine 4/10, however she was exposed to her niece who is Covid positive.  Patient tested positive for Covid and chest x-ray showed new multifocal consolidation and admitted for hypoxia needing high flow oxygen.  She was started on Decadron and given dose of Tocilizumab.  TTE showed pulmonary hypertension.  She was also given 3 days of azithromycin and Lasix.    Interval Problem Update  She feels overall improved. On 4L O2, but still desats and SOB with short distance of ambulation    Consultants/Specialty  ID    Code Status  Full Code    Disposition  Pending improved oxygenation    Review of Systems  Review of Systems   Constitutional: Positive for malaise/fatigue.   Respiratory: Positive for cough (improved) and shortness of breath (improved).    Cardiovascular: Negative for chest pain.   Gastrointestinal: Negative for abdominal pain, diarrhea and vomiting.   Neurological: Positive for weakness. Negative for dizziness.   All other systems reviewed and are negative.       Physical Exam  Temp:  [35.8 °C (96.5 °F)-36.3 °C (97.4 °F)] 36.3 °C (97.4 °F)  Pulse:  [71-86] 85  Resp:  [18-27] 27  BP: (101-119)/(64-73) 119/69  SpO2:  [92 %-97 %] 92 %    Physical Exam  Vitals and nursing note reviewed.   Constitutional:       General: She is not in acute distress.     Appearance: She is not toxic-appearing or diaphoretic.   HENT:      Head: Normocephalic.      Mouth/Throat:      Mouth: Mucous membranes are moist.   Eyes:      General:         Right eye: No discharge.         Left eye: No discharge.   Cardiovascular:      Rate and Rhythm: Normal rate and regular rhythm.   Pulmonary:      Effort: No respiratory distress.      Breath sounds: No  wheezing or rales.      Comments: Diminished throughout  Abdominal:      Palpations: Abdomen is soft.      Tenderness: There is no abdominal tenderness.   Musculoskeletal:      Cervical back: Neck supple.      Comments: No pitting edema   Skin:     General: Skin is warm and dry.   Neurological:      Mental Status: She is alert.      Comments: AOx4   Psychiatric:         Mood and Affect: Mood normal.         Behavior: Behavior normal.         Fluids  No intake or output data in the 24 hours ending 05/07/21 1224    Laboratory  Recent Labs     05/05/21  0153 05/07/21  0510   WBC 10.4 6.7   RBC 4.83 4.68   HEMOGLOBIN 11.1* 10.7*   HEMATOCRIT 37.7 36.9*   MCV 78.1* 78.8*   MCH 23.0* 22.9*   MCHC 29.4* 29.0*   RDW 55.9* 57.4*   PLATELETCT 293 242   MPV 9.7 10.2     Recent Labs     05/05/21  0153 05/07/21  0510   SODIUM 138 140   POTASSIUM 4.0 3.9   CHLORIDE 100 103   CO2 29 28   GLUCOSE 183* 145*   BUN 24* 11   CREATININE 0.42* 0.38*   CALCIUM 9.6 9.6                   Imaging  EC-ECHOCARDIOGRAM COMPLETE W/O CONT   Final Result      DX-CHEST-PORTABLE (1 VIEW)   Final Result      New moderate to severe multifocal consolidation is compatible with Covid pneumonia favored over bacterial pneumonia           Assessment/Plan  * Pneumonia due to COVID-19 virus- (present on admission)  Assessment & Plan  Complicated by acute hypoxic respiratory failure  Received Decadron and Tocilizumab  Robitussin and tessalon  Titrate oxygen as tolerable      Acute respiratory failure with hypoxia (HCC)- (present on admission)  Assessment & Plan  Received Decadron, Tocilizumab, azithromycin  No longer on high flow nasal cannula.    Continue weaning O2, I-S, ambulation      Morbidly obese (HCC)- (present on admission)  Assessment & Plan  Contributing to diabetes acute hypoxic respiratory failure  Discussed diet and exercise prior to discharge    Pulmonary HTN (HCC)- (present on admission)  Assessment & Plan  rvsp 55mmhg   Monitor volume status      Pulmonary infiltrates- (present on admission)  Assessment & Plan  Noted on Chest x-ray  Suggestive of covid      RT/O2  Titrate O2    Type 2 diabetes mellitus (HCC)- (present on admission)  Assessment & Plan  Hold home Metformin  Insulin sliding scale   Accu-Cheks  Glucose is controlled       VTE prophylaxis: lovenox

## 2021-05-07 NOTE — CARE PLAN
Problem: Communication  Goal: The ability to communicate needs accurately and effectively will improve  Outcome: PROGRESSING AS EXPECTED     Problem: Safety  Goal: Will remain free from injury  Outcome: PROGRESSING AS EXPECTED  Goal: Will remain free from falls  Outcome: PROGRESSING AS EXPECTED     Problem: Pain Management  Goal: Pain level will decrease to patient's comfort goal  Outcome: PROGRESSING AS EXPECTED     Problem: Respiratory:  Goal: Respiratory status will improve  Outcome: PROGRESSING AS EXPECTED     Problem: Mobility  Goal: Risk for activity intolerance will decrease  Outcome: PROGRESSING AS EXPECTED

## 2021-05-07 NOTE — CARE PLAN
Problem: Safety  Goal: Will remain free from injury  Note: Call light and belongings within reach, bed down and locked, hourly rounding in progress. Treaded socks in use, no DME at bedside. pt calls appropriately.       Problem: Infection  Goal: Will remain free from infection  Note: Droplet, contact, and eye precautions in place

## 2021-05-08 ENCOUNTER — TELEPHONE (OUTPATIENT)
Dept: OTHER | Facility: MEDICAL CENTER | Age: 51
End: 2021-05-08

## 2021-05-08 VITALS
DIASTOLIC BLOOD PRESSURE: 60 MMHG | WEIGHT: 293 LBS | OXYGEN SATURATION: 92 % | HEIGHT: 63 IN | BODY MASS INDEX: 51.91 KG/M2 | HEART RATE: 82 BPM | TEMPERATURE: 97.1 F | RESPIRATION RATE: 20 BRPM | SYSTOLIC BLOOD PRESSURE: 104 MMHG

## 2021-05-08 VITALS — HEART RATE: 95 BPM | RESPIRATION RATE: 22 BRPM | OXYGEN SATURATION: 91 %

## 2021-05-08 LAB
GLUCOSE BLD-MCNC: 141 MG/DL (ref 65–99)
GLUCOSE BLD-MCNC: 174 MG/DL (ref 65–99)
GLUCOSE BLD-MCNC: 195 MG/DL (ref 65–99)

## 2021-05-08 PROCEDURE — 82962 GLUCOSE BLOOD TEST: CPT | Mod: 91

## 2021-05-08 PROCEDURE — 99454 REM MNTR PHYSIOL PARAM 16-30: CPT

## 2021-05-08 PROCEDURE — A9270 NON-COVERED ITEM OR SERVICE: HCPCS | Performed by: INTERNAL MEDICINE

## 2021-05-08 PROCEDURE — 700102 HCHG RX REV CODE 250 W/ 637 OVERRIDE(OP): Performed by: STUDENT IN AN ORGANIZED HEALTH CARE EDUCATION/TRAINING PROGRAM

## 2021-05-08 PROCEDURE — 700102 HCHG RX REV CODE 250 W/ 637 OVERRIDE(OP): Performed by: INTERNAL MEDICINE

## 2021-05-08 PROCEDURE — 700102 HCHG RX REV CODE 250 W/ 637 OVERRIDE(OP): Performed by: HOSPITALIST

## 2021-05-08 PROCEDURE — 700111 HCHG RX REV CODE 636 W/ 250 OVERRIDE (IP): Performed by: STUDENT IN AN ORGANIZED HEALTH CARE EDUCATION/TRAINING PROGRAM

## 2021-05-08 PROCEDURE — A9270 NON-COVERED ITEM OR SERVICE: HCPCS | Performed by: HOSPITALIST

## 2021-05-08 PROCEDURE — A9270 NON-COVERED ITEM OR SERVICE: HCPCS | Performed by: STUDENT IN AN ORGANIZED HEALTH CARE EDUCATION/TRAINING PROGRAM

## 2021-05-08 PROCEDURE — 99239 HOSP IP/OBS DSCHRG MGMT >30: CPT | Performed by: INTERNAL MEDICINE

## 2021-05-08 PROCEDURE — 99453 REM MNTR PHYSIOL PARAM SETUP: CPT

## 2021-05-08 RX ADMIN — ENOXAPARIN SODIUM 40 MG: 40 INJECTION SUBCUTANEOUS at 05:46

## 2021-05-08 RX ADMIN — INSULIN LISPRO 1 UNITS: 100 INJECTION, SOLUTION INTRAVENOUS; SUBCUTANEOUS at 16:15

## 2021-05-08 RX ADMIN — BENZONATATE 100 MG: 100 CAPSULE ORAL at 16:14

## 2021-05-08 RX ADMIN — BENZONATATE 100 MG: 100 CAPSULE ORAL at 05:46

## 2021-05-08 RX ADMIN — BENZONATATE 100 MG: 100 CAPSULE ORAL at 11:13

## 2021-05-08 RX ADMIN — IBUPROFEN 600 MG: 600 TABLET ORAL at 08:26

## 2021-05-08 RX ADMIN — INSULIN LISPRO 1 UNITS: 100 INJECTION, SOLUTION INTRAVENOUS; SUBCUTANEOUS at 11:14

## 2021-05-08 NOTE — CARE PLAN
Problem: Safety  Goal: Will remain free from injury  Note: Call light and belongings within reach, bed down and locked, hourly rounding in progress. Treaded socks in use, no DME at bedside. pt calls appropriately.       Problem: Infection  Goal: Will remain free from infection  Note: Droplet, contact, and eye precautions in place.

## 2021-05-08 NOTE — THERAPY
Contact Note    Patient Name: Moni Wray  Age:  50 y.o., Sex:  female  Medical Record #: [id number]  Today's Date: 5/7/2021    Attempted tx with pt declining OOB activity 2' to current fatigue; focused on linens changed 2' to urinary incontinence and attempted to coerce pt to mobilize to chair to change linens/clean pt with pt continuing to decline activity; will re-attempt as able/pt willing to mobilize (note that pt was quite pleasant in her refusal)

## 2021-05-08 NOTE — FACE TO FACE
"Face to Face Note  -  Durable Medical Equipment    Laya Simons M.D. - NPI: [id number]  I certify that this patient is under my care and that they had a durable medical equipment(DME)face to face encounter by myself that meets the physician DME face-to-face encounter requirements with this patient on:    Date of encounter:   Patient:                    MRN:                       YOB: 2023  Moni Wray  [id number]  1970     The encounter with the patient was in whole, or in part, for the following medical condition, which is the primary reason for durable medical equipment:  Covid-19 Infection    I certify that, based on my findings, the following durable medical equipment is medically necessary:  Oxygen.    HOME O2 Saturation Measurements:(Values must be present for Home Oxygen orders)  Room air sat at rest: 85  Room air sat with amb: 80  With liters of O2: 3, O2 sat at rest with O2: 93  With Liters of O2: 5, O2 sat with amb with O2 : 90  Is the patient mobile?: Yes    My Clinical findings support the need for the above equipment due to:  Hypoxia    Supporting Symptoms: The patient requires supplemental oxygen, as the following interventions have been tried with limited or no improvement: \"Oral and/or IV steroids, \"Ambulation with oximetry and \"Incentive spirometry    If patient feels more short of breath, they can go up to 6 liters per minute and contact healthcare provider.  "

## 2021-05-08 NOTE — DISCHARGE PLANNING
Received Choice form at 1118  Agency[facility]Fac[facility] Name: [facility]  Referral sent per Choice form @ 1132     [facility]: [facility]  Spoke To: Adrianna  Outcome: Adrianna stated that referral is being processed and will be delivered to bedside

## 2021-05-08 NOTE — FACE TO FACE
"Face to Face Note  -  Durable Medical Equipment    Laya Simons M.D. - NPI: [id number]  I certify that this patient is under my care and that they had a durable medical equipment(DME)face to face encounter by myself that meets the physician DME face-to-face encounter requirements with this patient on:    Date of encounter:   Patient:                    MRN:                       YOB: 2023  Moni Wray  [id number]  1970     The encounter with the patient was in whole, or in part, for the following medical condition, which is the primary reason for durable medical equipment:  Covid-19 Infection    I certify that, based on my findings, the following durable medical equipment is medically necessary:  Oxygen.    HOME O2 Saturation Measurements:(Values must be present for Home Oxygen orders)  Room air sat at rest: 82  Room air sat with amb: 75  With liters of O2: 4, O2 sat at rest with O2: 94  With Liters of O2: 6, O2 sat with amb with O2 : 88  Is the patient mobile?: Yes    My Clinical findings support the need for the above equipment due to:  Hypoxia    Supporting Symptoms: The patient requires supplemental oxygen, as the following interventions have been tried with limited or no improvement: \"Oral and/or IV steroids, \"Ambulation with oximetry and \"Incentive spirometry    If patient feels more short of breath, they can go up to 6 liters per minute and contact healthcare provider.  "

## 2021-05-08 NOTE — DISCHARGE PLANNING
Anticipated Discharge Disposition: home with oxygen    Action: RN CM called patient on phone with , Santi, and patient gave consent to send referral to [facility]. Choice faxed to DPA.  Patient reports that she will be staying with her sister at discharge at address [address], [address], [place] [address]. RN CM updated [facility] to inform [facility].    Barriers to Discharge: oxygen set up    Plan: Case coordination to f/u with oxygen referral

## 2021-05-08 NOTE — THERAPY
Physical Therapy   Daily Treatment     Patient Name: Moni Wray  Age:  50 y.o., Sex:  female  Medical Record #: [id number]  Today's Date: 5/7/2021     Precautions: (COVID +)    Assessment    Pt progressing well. She has been up self with no AD per pt and nursing. She reports no functional concerns with dc to home once medically cleared. She has no further acute PT needs and primary concerns are regarding supplemental 02 needs 2' to COVID +. See below for details/recs.    Plan    fnx capable of dc to home once medically cleared.     DC Equipment Recommendations: None  Discharge Recommendations: Anticipate that the patient will have no further physical therapy needs after discharge from the hospital       05/07/21 1640   Cognition    Cognition / Consciousness WDL   Level of Consciousness Alert   Comments very pleasant and cooperative   Balance   Sitting Balance (Static) Fair +   Sitting Balance (Dynamic) Fair +   Standing Balance (Static) Fair   Standing Balance (Dynamic) Fair   Weight Shift Sitting Good   Weight Shift Standing Fair   Comments no rhett LOB during ambulation with no AD; pt has been up self in room   Gait Analysis   Gait Level Of Assist Supervised   Assistive Device None   Distance (Feet) 30   # of Times Distance was Traveled 1   Deviation Other (Comment)  (reciprocal gait; dec marla)   # of Stairs Climbed 0   Weight Bearing Status no restrictions   Skilled Intervention Verbal Cuing   Bed Mobility    Supine to Sit Modified Independent   Sit to Supine Supervised   Scooting Supervised   Skilled Intervention Verbal Cuing   Functional Mobility   Sit to Stand Supervised   Bed, Chair, Wheelchair Transfer Supervised   Transfer Method Stand Step   Mobility with no AD   How much difficulty does the patient currently have...   Turning over in bed (including adjusting bedclothes, sheets and blankets)? 3   Sitting down on and standing up from a chair with arms (e.g., wheelchair, bedside commode,  etc.) 3   Moving from lying on back to sitting on the side of the bed? 3   How much help from another person does the patient currently need...   Moving to and from a bed to a chair (including a wheelchair)? 4   Need to walk in a hospital room? 4   Climbing 3-5 steps with a railing? 3   6 clicks Mobility Score 20   Activity Tolerance   Sitting in Chair NT   Sitting Edge of Bed functional   Standing at least 2-3 mins   Comments noted SP02 dec to mid 80s, recovers with seated rest; pt given education re: monitoring signs/syptoms and rest for recovery    Short Term Goals    Short Term Goal # 1 Pt to demo bed mobility IND without use of bed features within 6 visits in order to return to PLOF   Goal Outcome # 1 Progressing as expected   Short Term Goal # 2 Pt to demo ambulation x250' mod-I within 6 visits in order to return to PLOF.    Goal Outcome # 2 Goal not met  (unable 2' to COVID + room)   Short Term Goal # 3 Pt to demo stair negotiation x10 mod-I within 6 visits in order to return to PLOF.    Goal Outcome # 3 Goal not met  (pt reports no conerns with stairs to dc; appears fnx capable)   Education Group   Role of Physical Therapist Patient Response Patient;Acceptance;Explanation;Verbal Demonstration   Additional Comments educaiton re: pacing on stairs and at time of dc; appears fnx capable of stairs and at least short distance ambulation to return home

## 2021-05-08 NOTE — DISCHARGE SUMMARY
Discharge Summary    CHIEF COMPLAINT ON ADMISSION  Chief Complaint   Patient presents with   • Shortness of Breath     for the last 2 days, pt erports that she got her first covid vaccine, pt has been around her neice that was covid positive.  pt placed herself on 6 liters NC with her sisters oxygen.  slight non-productive cough.        Reason for Admission  SOB     Admission Date  4/20/2021    CODE STATUS  Full Code    HPI & HOSPITAL COURSE  51 yo woman with chronic leg edema on Lasix who presented with 2 days of worsening shortness of breath.  Patient got her first Covid vaccine 4/10, however she was exposed to her niece who is Covid positive.  Patient tested positive for Covid and chest x-ray showed new multifocal consolidation and admitted for hypoxia needing high flow oxygen.  She was given course of Decadron and given dose of Tocilizumab.  TTE showed pulmonary hypertension.  She was also given 3 days of azithromycin and Lasix. She was slowly weaned to 3-5L O2. She was set up with home O2 and home monitoring on discharge.    Therefore, she is discharged in good and stable condition to home with close outpatient follow-up.    The patient met 2-midnight criteria for an inpatient stay at the time of discharge.    Discharge Date  5/8/21    FOLLOW UP ITEMS POST DISCHARGE  Post-COVID oxygen management  Pulm HTN - consider sleep study    DISCHARGE DIAGNOSES  Principal Problem:    Pneumonia due to COVID-19 virus POA: Yes  Active Problems:    Acute respiratory failure with hypoxia (HCC) POA: Yes    Type 2 diabetes mellitus (HCC) POA: Yes    Pulmonary infiltrates POA: Yes    Pulmonary HTN (HCC) POA: Yes    Morbidly obese (HCC) POA: Yes  Resolved Problems:    Decreased urine output POA: No      FOLLOW UP  Future Appointments   Date Time Provider Department Center   6/16/2021 10:00 AM PÉREZ Rodgers [facility]     [facility] [place]  [address]  [address]  [facility]  [address]  [phone]    For any oxygen equipment needs      MEDICATIONS ON DISCHARGE     Medication List      CONTINUE taking these medications      Instructions   Aspirin Powd   Take 1 Dose by mouth every 6 hours as needed (for pain). Unknown dose  Dose: 1 Dose     furosemide 40 MG Tabs  Commonly known as: LASIX   Take 40 mg by mouth 2 times a day.  Dose: 40 mg     naproxen 220 MG tablet  Commonly known as: ANAPROX   Take 440 mg by mouth 2 times a day as needed.  Dose: 440 mg     Synjardy 12.5-1000 MG Tabs  Generic drug: Empagliflozin-metFORMIN HCl   Take 1 tablet by mouth 2 times a day.  Dose: 1 tablet            Allergies  No Known Allergies    DIET  Orders Placed This Encounter   Procedures   • Diet Order Diet: Cardiac; Second Modifier: (optional): Consistent CHO (Diabetic)     Standing Status:   Standing     Number of Occurrences:   1     Order Specific Question:   Diet:     Answer:   Cardiac [6]     Order Specific Question:   Second Modifier: (optional)     Answer:   Consistent CHO (Diabetic) [4]       ACTIVITY  As tolerated.  Weight bearing as tolerated    CONSULTATIONS  ID    PROCEDURES  EC-ECHOCARDIOGRAM COMPLETE W/O CONT   Final Result      DX-CHEST-PORTABLE (1 VIEW)   Final Result      New moderate to severe multifocal consolidation is compatible with Covid pneumonia favored over bacterial pneumonia            LABORATORY  Lab Results   Component Value Date    SODIUM 140 05/07/2021    POTASSIUM 3.9 05/07/2021    CHLORIDE 103 05/07/2021    CO2 28 05/07/2021    GLUCOSE 145 (H) 05/07/2021    BUN 11 05/07/2021    CREATININE 0.38 (L) 05/07/2021        Lab Results   Component Value Date    WBC 6.7 05/07/2021    HEMOGLOBIN 10.7 (L) 05/07/2021    HEMATOCRIT 36.9 (L) 05/07/2021    PLATELETCT 242 05/07/2021        Total time of the discharge process exceeds 34 minutes.

## 2021-05-09 ENCOUNTER — TELEPHONE (OUTPATIENT)
Dept: OTHER | Facility: MEDICAL CENTER | Age: 51
End: 2021-05-09

## 2021-05-09 VITALS — HEART RATE: 99 BPM | RESPIRATION RATE: 16 BRPM | OXYGEN SATURATION: 77 %

## 2021-05-09 NOTE — RESPIRATORY CARE
REMOTE MONITORING PROGRAM by RESPIRATORY THERAPY  5/8/2021 at 6:22 PM by Justine Lamas     Patient interviewed by RT. Patient agrees to Remote Monitoring program. Device instruction performed with welcome packet, consent signed and placed at bedside chart. Patient instructed on how to use MyChart and Zoom. No questions at this time.

## 2021-05-09 NOTE — DISCHARGE INSTRUCTIONS
"COVID Discharge Instructions  (use where applicable).  You were diagnosed with COVID pneumonia.     Diet- regular. Stay hydrated.   Activity -use oxygen at 3-5L and slowly return to normal activity.   If applicable and [facility] is ordered: A home health staff will contact you to get a monitoring device in your home and you will have daily video visits with a doctor for a few days until you are improved.    ISOLATE  Please quarantine yourself until 20 days after symptom onset. On the last day of the quarantine, you need to be 24 hours symptom-free and no one ill in your home. Please call the COVID hotline @ [phone] to be cleared.    Until you receive your COVID test results and have shown improvement in your symptoms, for your health and safety, we kindly advise you to postpone all elective medical appointments, including but not limited to physical therapy, dental, chiropractic, routine check-ups, etc., until cleared by the [facility], local health department, or your personal physician. If you have any upcoming elective appointments, please contact that office directly in order to cancel, reschedule, or be evaluated for a potential virtual visit. (Staff: please check Appointment Desk and make notes if visits fall within isolation time-frame)    Please note- If you had any exposure to someone who tested positive for COVID and then you developed symptoms, even with a negative test, you must \"presume\" you are positive and isolate for 10 days after the onset of your symptoms.    Please do not allow any visitors. Isolate yourself at your home. No social gatherings, no public places (I.e [employer],  centers, shopping, or other public areas).  Do not shake hands. Please avoid close contact like hugging or kissing.  If living with others, try and consolidate to your own bathroom and bedroom if possible, try and wipe down hard surfaces twice a day.      ADVICE FOR YOU  Please call the COVID-19 hotline if " you develop increased SOB or worsening symptoms or proceed to the Emergency Department.  Please let the Emergency Department, 911 providers or any other healthcare providers know that you have a COVID test pending before you seek care.    Please wear a mask in public after you have been taken off isolation, and please follow [facility] and [facility] guidelines for public masking.  Use of a mask if you are around asymptomatic household members.   If you have lost your sense of taste or smell, it may take up to 6 weeks for that to improve completely.  You do not have to wait until your sense of taste and smell have completely returned prior to stopping isolation.  (see Home Isolation Instructions above for details).  If you are still having problems at 6 weeks, please see your Primary Care Provider.    • If you become sicker (even if your initial COVID test is negative), have difficulty breathing, or chest pain, or you are unable to eat or drink enough or have severe vomiting, diarrhea, or weakness, you may need to go to the Emergency Department or contact your clinic provider for re-evaluation.  o If you need care and are coming into the hospital or one of our clinics, inform the healthcare facility by phone that you have been tested for COVID-19 prior to entry.  o Put on a facemask before you enter the facility or before emergency services arrive if you have called 911.    o Unless you have severe difficulty breathing you will be expected to wear your mask throughout your hospital/clinic visit in order to protect our staff and other employees.  • Postpone all elective medical appointments within your isolation period, including but not limited to physical therapy, dental, chiropractic, routine check-ups, etc., until cleared by the [facility] department, or your personal physician. If you have any upcoming elective appointments, please contact that office directly in order to cancel, reschedule or be evaluated  for a potential virtual visit.    IF YOU LIVE WITH OTHERS  • Please have ALL household members quarantine until your test results are back.  They should not go into the public, to  or school, or go to work.  If any of your household members need a work note,  please have them schedule a visit by calling the [employer] hotline at [phone]. If you have further questions for your doctor, you may also schedule a visit with your Primary Care Physician.  • If living with others, try to have your own bathroom and bedroom if possible.  Please try and wipe down high-touch surfaces (counters, tabletops, doorknobs, bathroom fixtures, toilets, phones, keyboards, tablets, and bedside tables) at least twice a day. Avoid sharing personal household items. You should not share dishes, drinking glasses, cups, eating utensils, towels, or bedding with other people in your home. After using these items, they should be washed thoroughly with soap and water.  Also, clean any surfaces that may have blood, stool, or body fluids on them. Use a household cleaning spray or wipe, according to the label instructions. Labels contain instructions for safe and effective use of the cleaning product including precautions you should take when applying the product, such as wearing gloves and making sure you have good ventilation during the use of the product.  • Caregivers and household members should monitor for symptoms for at least 14 days after you come home if you are still in your infectious period.  Please have them call the [employer] Hotline at [phone] if they develop any symptoms.  • Clean your hands often. Wash your hands often with soap and water for at least 20 seconds. If soap and water are not available, clean your hands with an alcohol-based hand  that contains at least 70% alcohol, covering all surfaces of your hands and rubbing them together until they feel dry. Soap and water should be used if hands are visibly  dirty. Avoid touching your eyes, nose, and mouth with unwashed hands.  • Cover your coughs and sneezes.    Please see the resources below for more information:    • CDC Corona Website https://www.cdc.gov/coronavirus/2019-ncov/index.html  • General Information https://www.cdc.gov/coronavirus/2019-ncov/faq.html  • [facility]: [phone]  • [facility] Line [phone]  • For asymptomatic testing for yourself and your family, go to [facility].care for testing at [facility].      Discharge Instructions    Discharged to home by car with relative. Discharged via wheelchair, hospital escort: Yes.  Special equipment needed: Oxygen and remote home monitoring    Be sure to schedule a follow-up appointment with your primary care doctor or any specialists as instructed.     Discharge Plan:   Diet Plan: Discussed  Activity Level: Discussed  Confirmed Follow up Appointment: Patient to Call and Schedule Appointment  Confirmed Symptoms Management: Discussed  Medication Reconciliation Updated: Yes    I understand that a diet low in cholesterol, fat, and sodium is recommended for good health. Unless I have been given specific instructions below for another diet, I accept this instruction as my diet prescription.   Other diet: cardiac, diabetic    Special Instructions: None    · Is patient discharged on Warfarin / Coumadin?   No     Depression / Suicide Risk    As you are discharged from this [facility] facility, it is important to learn how to keep safe from harming yourself.    Recognize the warning signs:  · Abrupt changes in personality, positive or negative- including increase in energy   · Giving away possessions  · Change in eating patterns- significant weight changes-  positive or negative  · Change in sleeping patterns- unable to sleep or sleeping all the time   · Unwillingness or inability to communicate  · Depression  · Unusual sadness, discouragement and loneliness  · Talk of  wanting to die  · Neglect of personal appearance   · Rebelliousness- reckless behavior  · Withdrawal from people/activities they love  · Confusion- inability to concentrate     If you or a loved one observes any of these behaviors or has concerns about self-harm, here's what you can do:  · Talk about it- your feelings and reasons for harming yourself  · Remove any means that you might use to hurt yourself (examples: pills, rope, extension cords, firearm)  · Get professional help from the community (Mental Health, Substance Abuse, psychological counseling)  · Do not be alone:Call your Safe Contact- someone whom you trust who will be there for you.  · Call your local CRISIS HOTLINE 620-3400 or [phone]  · Call your local Children's Mobile Crisis Response Team [place] (812) 214-8175 or www.[employer]  · Call the toll free National Suicide Prevention Hotlines   · National Suicide Prevention Lifeline 968-941-ZMEP (2115)  · [facility] Network 800-SUICIDE (690-9299)      COVID-19 Frequently Asked Questions  COVID-19 (coronavirus disease) is an infection that is caused by a large family of viruses. Some viruses cause illness in people and others cause illness in animals like camels, cats, and bats. In some cases, the viruses that cause illness in animals can spread to humans.  Where did the coronavirus come from?  In December 2019, [place] told the World Health Organization (WHO) of several cases of lung disease (human respiratory illness). These cases were linked to an open seafood and livestock market in the city of [place]. The link to the seafood and livestock market suggests that the virus may have spread from animals to humans. However, since that first outbreak in December, the virus has also been shown to spread from person to person.  What is the name of the disease and the virus?  Disease name  Early on, this disease was called novel coronavirus. This is because scientists determined that the  disease was caused by a new (novel) respiratory virus. The World Health Organization (WHO) has now named the disease COVID-19, or coronavirus disease.  Virus name  The virus that causes the disease is called severe acute respiratory syndrome coronavirus 2 (SARS-CoV-2).  More information on disease and virus naming  World Health Organization (WHO): www.who.int/emergencies/diseases/novel-coronavirus-2019/technical-guidance/naming-the-coronavirus-disease-(covid-2019)-and-the-virus-that-causes-it  Who is at risk for complications from coronavirus disease?  Some people may be at higher risk for complications from coronavirus disease. This includes older adults and people who have chronic diseases, such as heart disease, diabetes, and lung disease.  If you are at higher risk for complications, take these extra precautions:  · Avoid close contact with people who are sick or have a fever or cough. Stay at least 3-6 ft (1-2 m) away from them, if possible.  · Wash your hands often with soap and water for at least 20 seconds.  · Avoid touching your face, mouth, nose, or eyes.  · Keep supplies on hand at home, such as food, medicine, and cleaning supplies.  · Stay home as much as possible.  · Avoid social gatherings and travel.  How does coronavirus disease spread?  The virus that causes coronavirus disease spreads easily from person to person (is contagious). There are also cases of community-spread disease. This means the disease has spread to:  · People who have no known contact with other infected people.  · People who have not traveled to areas where there are known cases.  It appears to spread from one person to another through droplets from coughing or sneezing.  Can I get the virus from touching surfaces or objects?  There is still a lot that we do not know about the virus that causes coronavirus disease. Scientists are basing a lot of information on what they know about similar viruses, such as:  · Viruses cannot  generally survive on surfaces for long. They need a human body (host) to survive.  · It is more likely that the virus is spread by close contact with people who are sick (direct contact), such as through:  ? Shaking hands or hugging.  ? Breathing in respiratory droplets that travel through the air. This can happen when an infected person coughs or sneezes on or near other people.  · It is less likely that the virus is spread when a person touches a surface or object that has the virus on it (indirect contact). The virus may be able to enter the body if the person touches a surface or object and then touches his or her face, eyes, nose, or mouth.  Can a person spread the virus without having symptoms of the disease?  It may be possible for the virus to spread before a person has symptoms of the disease, but this is most likely not the main way the virus is spreading. It is more likely for the virus to spread by being in close contact with people who are sick and breathing in the respiratory droplets of a sick person's cough or sneeze.  What are the symptoms of coronavirus disease?  Symptoms vary from person to person and can range from mild to severe. Symptoms may include:  · Fever.  · Cough.  · Tiredness, weakness, or fatigue.  · Fast breathing or feeling short of breath.  These symptoms can appear anywhere from 2 to 14 days after you have been exposed to the virus. If you develop symptoms, call your health care provider. People with severe symptoms may need hospital care.  If I am exposed to the virus, how long does it take before symptoms start?  Symptoms of coronavirus disease may appear anywhere from 2 to 14 days after a person has been exposed to the virus. If you develop symptoms, call your health care provider.  Should I be tested for this virus?  Your health care provider will decide whether to test you based on your symptoms, history of exposure, and your risk factors.  How does a health care provider test  for this virus?  Health care providers will collect samples to send for testing. Samples may include:  · Taking a swab of fluid from the nose.  · Taking fluid from the lungs by having you cough up mucus (sputum) into a sterile cup.  · Taking a blood sample.  · Taking a stool or urine sample.  Is there a treatment or vaccine for this virus?  Currently, there is no vaccine to prevent coronavirus disease. Also, there are no medicines like antibiotics or antivirals to treat the virus. A person who becomes sick is given supportive care, which means rest and fluids. A person may also relieve his or her symptoms by using over-the-counter medicines that treat sneezing, coughing, and runny nose. These are the same medicines that a person takes for the common cold.  If you develop symptoms, call your health care provider. People with severe symptoms may need hospital care.  What can I do to protect myself and my family from this virus?         You can protect yourself and your family by taking the same actions that you would take to prevent the spread of other viruses. Take the following actions:  · Wash your hands often with soap and water for at least 20 seconds. If soap and water are not available, use alcohol-based hand .  · Avoid touching your face, mouth, nose, or eyes.  · Cough or sneeze into a tissue, sleeve, or elbow. Do not cough or sneeze into your hand or the air.  ? If you cough or sneeze into a tissue, throw it away immediately and wash your hands.  · Disinfect objects and surfaces that you frequently touch every day.  · Avoid close contact with people who are sick or have a fever or cough. Stay at least 3-6 ft (1-2 m) away from them, if possible.  · Stay home if you are sick, except to get medical care. Call your health care provider before you get medical care.  · Make sure your vaccines are up to date. Ask your health care provider what vaccines you need.  What should I do if I need to  travel?  Follow travel recommendations from your local health authority, the CDC, and WHO.  Travel information and advice  · Centers for Disease Control and Prevention (CDC): www.cdc.gov/coronavirus/2019-ncov/travelers/index.html  · World Health Organization (WHO): www.who.int/emergencies/diseases/novel-coronavirus-2019/travel-advice  Know the risks and take action to protect your health  · You are at higher risk of getting coronavirus disease if you are traveling to areas with an outbreak or if you are exposed to travelers from areas with an outbreak.  · Wash your hands often and practice good hygiene to lower the risk of catching or spreading the virus.  What should I do if I am sick?  General instructions to stop the spread of infection  · Wash your hands often with soap and water for at least 20 seconds. If soap and water are not available, use alcohol-based hand .  · Cough or sneeze into a tissue, sleeve, or elbow. Do not cough or sneeze into your hand or the air.  · If you cough or sneeze into a tissue, throw it away immediately and wash your hands.  · Stay home unless you must get medical care. Call your health care provider or local health authority before you get medical care.  · Avoid public areas. Do not take public transportation, if possible.  · If you can, wear a mask if you must go out of the house or if you are in close contact with someone who is not sick.  Keep your home clean  · Disinfect objects and surfaces that are frequently touched every day. This may include:  ? Counters and tables.  ? Doorknobs and light switches.  ? Sinks and faucets.  ? Electronics such as phones, remote controls, keyboards, computers, and tablets.  · Wash dishes in hot, soapy water or use a . Air-dry your dishes.  · Wash laundry in hot water.  Prevent infecting other household members  · Let healthy household members care for children and pets, if possible. If you have to care for children or pets,  wash your hands often and wear a mask.  · Sleep in a different bedroom or bed, if possible.  · Do not share personal items, such as razors, toothbrushes, deodorant, malloy, brushes, towels, and washcloths.  Where to find more information  Centers for Disease Control and Prevention (CDC)  · Information and news updates: www.cdc.gov/coronavirus/2019-ncov  World Health Organization (WHO)  · Information and news updates: www.who.int/emergencies/diseases/novel-coronavirus-2019  · Coronavirus health topic: www.who.int/health-topics/coronavirus  · Questions and answers on COVID-19: www.who.int/news-room/q-a-detail/c-t-kfxrfurzsztnw  · Global tracker: who.[employer]  American Academy of Pediatrics (AAP)  · Information for families: www.healthychildren.org/English/health-issues/conditions/chest-lungs/Pages/2019-Novel-Coronavirus.aspx  The coronavirus situation is changing rapidly. Check your local health authority website or the CDC and WHO websites for updates and news.  When should I contact a health care provider?  · Contact your health care provider if you have symptoms of an infection, such as fever or cough, and you:  ? Have been near anyone who is known to have coronavirus disease.  ? Have come into contact with a person who is suspected to have coronavirus disease.  ? Have traveled outside of the country.  When should I get emergency medical care?  · Get help right away by calling your local emergency services (911 in the [place] if you have:  ? Trouble breathing.  ? Pain or pressure in your chest.  ? Confusion.  ? Blue-tinged lips and fingernails.  ? Difficulty waking from sleep.  ? Symptoms that get worse.  Let the emergency medical personnel know if you think you have coronavirus disease.  Summary  · A new respiratory virus is spreading from person to person and causing COVID-19 (coronavirus disease).  · The virus that causes COVID-19 appears to spread easily. It spreads from one person to another through droplets  from coughing or sneezing.  · Older adults and those with chronic diseases are at higher risk of disease. If you are at higher risk for complications, take extra precautions.  · There is currently no vaccine to prevent coronavirus disease. There are no medicines, such as antibiotics or antivirals, to treat the virus.  · You can protect yourself and your family by washing your hands often, avoiding touching your face, and covering your coughs and sneezes.  This information is not intended to replace advice given to you by your health care provider. Make sure you discuss any questions you have with your health care provider.  Document Released: 04/14/2020 Document Revised: 04/14/2020 Document Reviewed: 04/14/2020  Elsevier Patient Education © 2020 [employer]

## 2021-05-09 NOTE — PROGRESS NOTES
Assumed care at 1900. Received report from day shift RN. Pt is awake and alert in bed, A&Ox 4, on 3L NC, reports a pain level of 0/10. Home O2 education and supplies given to pt and family, pt discharged from unit via wheelchair.

## 2021-05-09 NOTE — TELEPHONE ENCOUNTER
Called patient to inform of disconnect. Patient said she would reconnect it   Will continue to monitor.

## 2021-05-10 ENCOUNTER — TELEPHONE (OUTPATIENT)
Dept: OTHER | Facility: MEDICAL CENTER | Age: 51
End: 2021-05-10

## 2021-05-10 VITALS — HEART RATE: 104 BPM | RESPIRATION RATE: 24 BRPM | OXYGEN SATURATION: 84 %

## 2021-05-10 VITALS — OXYGEN SATURATION: 83 % | HEART RATE: 116 BPM | RESPIRATION RATE: 27 BRPM

## 2021-05-10 VITALS — RESPIRATION RATE: 19 BRPM | OXYGEN SATURATION: 85 % | HEART RATE: 97 BPM

## 2021-05-10 VITALS — RESPIRATION RATE: 18 BRPM | OXYGEN SATURATION: 94 % | HEART RATE: 93 BPM

## 2021-05-10 NOTE — TELEPHONE ENCOUNTER
Called patient to inform of disconnection. Patients light was blinking red. Will change out her battery.

## 2021-05-10 NOTE — TELEPHONE ENCOUNTER
Called patient about disconnect. Put on a new monitor and is not back on the board.   Will continue to monitor

## 2021-05-10 NOTE — TELEPHONE ENCOUNTER
Called pt to inform of low oxygen readings in the 70's and 60's. Patient says she feels fine and her own o2 reader says she is saying she is at 92%.    Will continue to monitor

## 2021-05-10 NOTE — TELEPHONE ENCOUNTER
Called patient to inform of low oxygen reading of 83%. Daughter stated she was going to the restroom and washing her face and cleaning up a nose bleed. Patient will put her oxygen back on once she is all done but as of right now patient feels fine. Told family to call if they need anything.  Will continue to monitor.

## 2021-05-10 NOTE — TELEPHONE ENCOUNTER
Called patient again for alert for oxygen-no answer. Oxygen alerted to 64%. Oxygen is now back up to 88%.     Now back up to 93%

## 2021-05-10 NOTE — TELEPHONE ENCOUNTER
Pt alerted for low oxygen saturation at 1:45. She stated that she had gone to the bathroom and felt fine. Her levels went back to normal while we were on the phone.

## 2021-05-10 NOTE — TELEPHONE ENCOUNTER
Called patient to inform of low oxygen reading. Patients daughter said that he oxygen had been unplugged be they plugged it back in. While talking to them their own o2 reader was saying 94% while ours was saying 64%. Asked patients daughter if we could switch the sensor to another finger.    Will continue to monitor.

## 2021-05-10 NOTE — TELEPHONE ENCOUNTER
Called patient on oxygen alerting to 74%. Called patient and no answer. As of right now it is at 85%.  Will try to reach patient again or emergency contact.

## 2021-05-11 ENCOUNTER — TELEPHONE (OUTPATIENT)
Dept: OTHER | Facility: MEDICAL CENTER | Age: 51
End: 2021-05-11

## 2021-05-11 VITALS — RESPIRATION RATE: 22 BRPM | OXYGEN SATURATION: 81 % | HEART RATE: 101 BPM

## 2021-05-11 VITALS — HEART RATE: 98 BPM | RESPIRATION RATE: 22 BRPM | OXYGEN SATURATION: 86 %

## 2021-05-11 VITALS — HEART RATE: 101 BPM | OXYGEN SATURATION: 82 % | RESPIRATION RATE: 22 BRPM

## 2021-05-11 VITALS — HEART RATE: 94 BPM | RESPIRATION RATE: 34 BRPM | OXYGEN SATURATION: 83 %

## 2021-05-11 VITALS — OXYGEN SATURATION: 83 % | RESPIRATION RATE: 20 BRPM | HEART RATE: 102 BPM

## 2021-05-11 VITALS — RESPIRATION RATE: 29 BRPM | HEART RATE: 113 BPM | OXYGEN SATURATION: 83 %

## 2021-05-11 VITALS — OXYGEN SATURATION: 93 % | RESPIRATION RATE: 21 BRPM | HEART RATE: 99 BPM

## 2021-05-11 NOTE — TELEPHONE ENCOUNTER
Pt disconnected around 18:50. It was not connected to her cell phone properly. We were able to troubleshoot and get her reconnected.

## 2021-05-11 NOTE — TELEPHONE ENCOUNTER
Called patient to inform of low oxygen reading. Patient stated she went to the bathroom and she was fine.  Will continue to monitor.

## 2021-05-11 NOTE — TELEPHONE ENCOUNTER
Pt alerted for low oxygen at 01:43. She stated that she felt fine. Her oxygen levels went back to normal while we were on the phone.

## 2021-05-12 ENCOUNTER — TELEPHONE (OUTPATIENT)
Dept: OTHER | Facility: MEDICAL CENTER | Age: 51
End: 2021-05-12

## 2021-05-12 VITALS — RESPIRATION RATE: 12 BRPM | HEART RATE: 107 BPM | OXYGEN SATURATION: 85 %

## 2021-05-12 VITALS — RESPIRATION RATE: 24 BRPM | HEART RATE: 144 BPM | OXYGEN SATURATION: 87 %

## 2021-05-12 VITALS — OXYGEN SATURATION: 37 % | HEART RATE: 95 BPM | RESPIRATION RATE: 16 BRPM

## 2021-05-12 VITALS — HEART RATE: 97 BPM | RESPIRATION RATE: 26 BRPM | OXYGEN SATURATION: 83 %

## 2021-05-12 VITALS — OXYGEN SATURATION: 76 % | HEART RATE: 78 BPM | RESPIRATION RATE: 17 BRPM

## 2021-05-12 VITALS — RESPIRATION RATE: 19 BRPM | HEART RATE: 97 BPM | OXYGEN SATURATION: 34 %

## 2021-05-12 VITALS — RESPIRATION RATE: 16 BRPM | OXYGEN SATURATION: 84 % | HEART RATE: 117 BPM

## 2021-05-12 VITALS — OXYGEN SATURATION: 83 % | HEART RATE: 94 BPM | RESPIRATION RATE: 16 BRPM

## 2021-05-12 VITALS — HEART RATE: 91 BPM | RESPIRATION RATE: 24 BRPM | OXYGEN SATURATION: 80 %

## 2021-05-12 NOTE — TELEPHONE ENCOUNTER
Pt disconnected around 03:10. She stated that she felt fine. She got reconnected shortly afterward.

## 2021-05-12 NOTE — TELEPHONE ENCOUNTER
Patient alerted for low oxygen. I called, patient was fine and had been brushing her teeth. Oxygen increased shortly after.

## 2021-05-12 NOTE — TELEPHONE ENCOUNTER
Patient alerted for low oxygen. I called, patient was fine, she had turned over in bed and her oxygen decreased. Her oxygen increased shortly after.

## 2021-05-12 NOTE — TELEPHONE ENCOUNTER
Called patient she advised she is okay and her number are reading O2 @ 92% I called patient EC and asked if they could check the tape on patient finger to make sure it is on good to get a better reading

## 2021-05-13 ENCOUNTER — TELEPHONE (OUTPATIENT)
Dept: OTHER | Facility: MEDICAL CENTER | Age: 51
End: 2021-05-13

## 2021-05-13 VITALS — OXYGEN SATURATION: 88 % | HEART RATE: 100 BPM | RESPIRATION RATE: 26 BRPM

## 2021-05-13 VITALS — OXYGEN SATURATION: 83 % | HEART RATE: 96 BPM | RESPIRATION RATE: 17 BRPM

## 2021-05-13 VITALS — OXYGEN SATURATION: 67 % | RESPIRATION RATE: 19 BRPM | HEART RATE: 92 BPM

## 2021-05-13 VITALS — OXYGEN SATURATION: 81 % | RESPIRATION RATE: 33 BRPM | HEART RATE: 107 BPM

## 2021-05-13 VITALS — HEART RATE: 88 BPM | OXYGEN SATURATION: 79 % | RESPIRATION RATE: 20 BRPM

## 2021-05-13 VITALS — RESPIRATION RATE: 23 BRPM | OXYGEN SATURATION: 83 % | HEART RATE: 99 BPM

## 2021-05-13 VITALS — HEART RATE: 95 BPM | OXYGEN SATURATION: 75 % | RESPIRATION RATE: 15 BRPM

## 2021-05-13 VITALS — HEART RATE: 94 BPM | RESPIRATION RATE: 23 BRPM | OXYGEN SATURATION: 84 %

## 2021-05-13 VITALS — HEART RATE: 103 BPM | OXYGEN SATURATION: 84 % | RESPIRATION RATE: 19 BRPM

## 2021-05-13 VITALS — RESPIRATION RATE: 21 BRPM | OXYGEN SATURATION: 80 % | HEART RATE: 92 BPM

## 2021-05-13 VITALS — RESPIRATION RATE: 17 BRPM | OXYGEN SATURATION: 89 % | HEART RATE: 97 BPM

## 2021-05-13 VITALS — OXYGEN SATURATION: 70 % | HEART RATE: 90 BPM | RESPIRATION RATE: 16 BRPM

## 2021-05-13 VITALS — HEART RATE: 85 BPM | OXYGEN SATURATION: 82 % | RESPIRATION RATE: 20 BRPM

## 2021-05-13 VITALS — HEART RATE: 97 BPM | OXYGEN SATURATION: 84 % | RESPIRATION RATE: 23 BRPM

## 2021-05-13 NOTE — TELEPHONE ENCOUNTER
Pt alerted for low oxygen at 2226. She stated that she felt fine. Oxygen levels went back up while we were on the phone.

## 2021-05-13 NOTE — TELEPHONE ENCOUNTER
Pt alerted for low oxygen at 1907. She stated that she felt fine. Oxygen went back to normal while we were on the phone.

## 2021-05-13 NOTE — TELEPHONE ENCOUNTER
Pt alerted for low oxygen at 1824. She stated that she felt fine. Her oxygen went back to normal while we were on the phone.

## 2021-05-13 NOTE — TELEPHONE ENCOUNTER
Pt alerted for low oxygen saturation at 0342. She stated that she felt fine. Oxygen went back up to normal shortly after.

## 2021-05-13 NOTE — TELEPHONE ENCOUNTER
Pt alerted for low oxygen 0129. She stated that she felt fine. Oxygen went back up while we were on the phone.

## 2021-05-13 NOTE — TELEPHONE ENCOUNTER
Patient and patients daughter called in and advised that she is going to take a shower and be disconnected

## 2021-05-13 NOTE — TELEPHONE ENCOUNTER
Pt alerted for low oxygen at 0546. She stated that she felt fine. Her oxygen levels went back to normal while we were on the phone.

## 2021-05-13 NOTE — TELEPHONE ENCOUNTER
Pt alerted for low oxygen at 03:07. She stated that she felt fine. Oxygen levels went back to normal shortly after.

## 2021-05-13 NOTE — TELEPHONE ENCOUNTER
Pt alerted for low oxygen saturation at 0159. She stated that she felt fine. Her oxygen level went back to normal while we were on the phone.

## 2021-05-14 ENCOUNTER — TELEPHONE (OUTPATIENT)
Dept: OTHER | Facility: MEDICAL CENTER | Age: 51
End: 2021-05-14

## 2021-05-14 ENCOUNTER — TELEMEDICINE (OUTPATIENT)
Dept: URGENT CARE | Facility: CLINIC | Age: 51
End: 2021-05-14
Payer: MEDICAID

## 2021-05-14 VITALS — OXYGEN SATURATION: 40 % | RESPIRATION RATE: 18 BRPM | HEART RATE: 83 BPM

## 2021-05-14 VITALS — RESPIRATION RATE: 22 BRPM | HEART RATE: 93 BPM | OXYGEN SATURATION: 80 %

## 2021-05-14 VITALS — RESPIRATION RATE: 19 BRPM | HEART RATE: 84 BPM | OXYGEN SATURATION: 87 %

## 2021-05-14 VITALS — OXYGEN SATURATION: 84 % | HEART RATE: 94 BPM | RESPIRATION RATE: 20 BRPM

## 2021-05-14 VITALS — OXYGEN SATURATION: 68 % | HEART RATE: 98 BPM | RESPIRATION RATE: 30 BRPM

## 2021-05-14 VITALS — RESPIRATION RATE: 12 BRPM | HEART RATE: 109 BPM | OXYGEN SATURATION: 83 %

## 2021-05-14 VITALS — RESPIRATION RATE: 16 BRPM | HEART RATE: 82 BPM | OXYGEN SATURATION: 93 %

## 2021-05-14 VITALS — OXYGEN SATURATION: 88 % | HEART RATE: 92 BPM | RESPIRATION RATE: 26 BRPM

## 2021-05-14 VITALS — OXYGEN SATURATION: 84 %

## 2021-05-14 VITALS — RESPIRATION RATE: 22 BRPM | OXYGEN SATURATION: 77 % | HEART RATE: 94 BPM

## 2021-05-14 VITALS — HEART RATE: 85 BPM | OXYGEN SATURATION: 79 % | RESPIRATION RATE: 18 BRPM

## 2021-05-14 VITALS — RESPIRATION RATE: 19 BRPM | OXYGEN SATURATION: 78 % | HEART RATE: 83 BPM

## 2021-05-14 VITALS — OXYGEN SATURATION: 82 % | HEART RATE: 98 BPM | RESPIRATION RATE: 20 BRPM

## 2021-05-14 VITALS — HEART RATE: 110 BPM | OXYGEN SATURATION: 89 %

## 2021-05-14 VITALS — HEART RATE: 71 BPM | RESPIRATION RATE: 18 BRPM | OXYGEN SATURATION: 84 %

## 2021-05-14 VITALS — OXYGEN SATURATION: 81 %

## 2021-05-14 VITALS — RESPIRATION RATE: 21 BRPM | HEART RATE: 106 BPM | OXYGEN SATURATION: 50 %

## 2021-05-14 VITALS — OXYGEN SATURATION: 83 % | HEART RATE: 86 BPM | RESPIRATION RATE: 19 BRPM

## 2021-05-14 VITALS — RESPIRATION RATE: 25 BRPM | HEART RATE: 86 BPM | OXYGEN SATURATION: 84 %

## 2021-05-14 VITALS — OXYGEN SATURATION: 45 %

## 2021-05-14 VITALS — OXYGEN SATURATION: 82 %

## 2021-05-14 DIAGNOSIS — J12.82 PNEUMONIA DUE TO COVID-19 VIRUS: ICD-10-CM

## 2021-05-14 DIAGNOSIS — U07.1 PNEUMONIA DUE TO COVID-19 VIRUS: ICD-10-CM

## 2021-05-14 DIAGNOSIS — Z99.81 OXYGEN DEPENDENT: ICD-10-CM

## 2021-05-14 DIAGNOSIS — E11.69 TYPE 2 DIABETES MELLITUS WITH OTHER SPECIFIED COMPLICATION, UNSPECIFIED WHETHER LONG TERM INSULIN USE (HCC): ICD-10-CM

## 2021-05-14 DIAGNOSIS — J96.01 ACUTE RESPIRATORY FAILURE WITH HYPOXIA (HCC): ICD-10-CM

## 2021-05-14 PROCEDURE — 99458 RPM TX MGMT EA ADDL 20 MIN: CPT | Mod: 95,CR | Performed by: PHYSICIAN ASSISTANT

## 2021-05-14 PROCEDURE — 99457 RPM TX MGMT 1ST 20 MIN: CPT | Mod: 95,CR | Performed by: PHYSICIAN ASSISTANT

## 2021-05-14 ASSESSMENT — ENCOUNTER SYMPTOMS
SPUTUM PRODUCTION: 0
SHORTNESS OF BREATH: 1
COUGH: 0
WHEEZING: 0
DIARRHEA: 0
VOMITING: 0

## 2021-05-14 NOTE — TELEPHONE ENCOUNTER
Pt's  oxygen dropped  To 78%. Called pt, she states she is feeling fine. While taking to pt her O2 went back up to 90%. Will continue to monitor.

## 2021-05-14 NOTE — TELEPHONE ENCOUNTER
Patient alert 3 times, 1st 83% 0533, 2nd 78% at 0539 and 3rd 81% at 0545. I was talking to the patient and she is just walking around , she state she is doing fine. Patient doesn't know why it goes up and down for her oxygen levels

## 2021-05-14 NOTE — TELEPHONE ENCOUNTER
Called and talked to pt again regarding low reading on the system. Pt stated her phone is reading 90-92% on her finger probe. Asked pt to please change her probe to see if that maybe the culprit to the alerts.

## 2021-05-14 NOTE — TELEPHONE ENCOUNTER
Patient got 3 alert. 1st alert 40% AT 0501 called the patient she ok just walking to restroom she is ok. 2nd alert  83% at 0507, Patient in the rest room and she is ok. 3rd alert at 28% at 0513 patient walking back to  the bed and and also doing okay as well.

## 2021-05-14 NOTE — TELEPHONE ENCOUNTER
Called pt to notify of low oxygen. Escalate to supervisor of frequent alerting on this pt. Advise to ask pt to increase oxygen to 4L from 3L. Asked pt if finger probe is wet or need change, pt stated no need.

## 2021-05-14 NOTE — TELEPHONE ENCOUNTER
Patient got a alert for low oxygen level. I called the patient. She doing okay just moving around the bed

## 2021-05-14 NOTE — PROGRESS NOTES
Telemedicine Visit:      Subjective:   Moni Wray is a 50 y.o. female presenting for evaluation and management of Covid-19 Home Monitoring     This evaluation was conducted via [employer] using secure and encrypted videoconferencing technology. The patient was in a private location in the state of [place].    The patient's identity was confirmed and verbal consent was obtained for this virtual visit.  Patient's niece is also available for translation at this time.  Home Monitoring Patient Triage  COVID-19 Monitoring Level:         Patient is a 50-year-old female via telemedicine with the assistant of her niece as a .  Patient recently had hospitalization after 4 days of shortness of breath worsening cough and symptoms when she was admitted on April 20 and discharged on May 8.  During her course patient was given Decadron along with Tocilizumab and was ultimately discharged on 3 L.  Patient has had several episodes of desaturations and being contacted by the monitoring staff throughout the day-there does appear to be a discrepancy as patient's pulse ox rarely is revealing the same numbers as the monitoring levels.  Patient does report that she has been conducting her breathing exercises with the incentive spirometry along with forcing herself to take deep breaths.  In general patient does feel that her breathing is better however she does become exertionally short of breath and significantly fatigued.  She also reports that her legs have been swelling however she is not been taking the Lasix is that does increase her urinary frequency however notes that her oxygenation sats decreased as she goes to the restroom.  Patient is found that she is needed to be on 4 L in particular with exertion i.e. going to the restroom or getting dressed.  Of note patient does have upcoming appointment with her PCP on May 18.  Finally patient is diabetic and reports she has been compliant with her  medications-fasting glucose yesterday was 130.        [facility]xygen Flowsheet   1. Record COVID-19 Severity Index (qCSI):   4  2.Confirm appropriate patient and chart with two identifiers:   Y  3. Days Since Onset of Symptoms:   28 days.   4. Does patient have another adult at home to help take care of you:  Y  5. Confirm O2 supply is working and there are no cannula problems:  Y  6. Is your breathing today better or worse?  Better.   7. Are you able to tolerate fluids?  Y  8. Any vomiting or diarrhea in the last 24 hours?  N  9. Are you able to control your fevers?  NA  10. Are you able to control your cough?   Y  11. Current O2 Flow Rate:   3-4 L.   12. Review ER precautions: present to ED or call 911 if experiencing severe shortness of breath:  Y  13. Confirm next VV:  Y  14. Final disposition:  Fair.   15. Time Spent:    Total- greater than 40 min.       Review of Systems   Constitutional: Positive for malaise/fatigue.   Respiratory: Positive for shortness of breath. Negative for cough, sputum production and wheezing.    Cardiovascular: Positive for leg swelling.   Gastrointestinal: Negative for diarrhea and vomiting.   All other systems reviewed and are negative.      Current medicines (including changes today)  Medications:    • Aspirin Powd  • furosemide Tabs  • naproxen  • Synjardy Tabs    Allergies: Patient has no known allergies.    Problem List: Moni Wray does not have any pertinent problems on file.    Surgical History:  Past Surgical History:   Procedure Laterality Date   • CHOLECYSTECTOMY         Past Social Hx: Moni Wray  reports that she has been smoking. She has never used smokeless tobacco. She reports that she does not drink alcohol and does not use drugs.     Past Family Hx:  Moni Wray family history is not on file.     Problem list, medications, and allergies reviewed by myself today in Epic.     Objective:   Pulse 82   Resp 16   SpO2  93%  (from [employer] home monitor)    Physical Exam:  Constitutional:       General: Awake.      Appearance: Not toxic-appearing.   HENT:      Nose: No signs of injury.      Mouth/Throat: Voice clear.     Lips: Pink.   Eyes:      General: Lids are normal.      Conjunctiva/sclera: Conjunctivae normal.   Neck:      Trachea: Phonation normal.   Pulmonary:      Effort: Pulmonary effort is normal.   Skin:     Coloration: Skin is not cyanotic or pale.   Neurological:      Mental Status: Alert and oriented to person, place, and time.   Psychiatric:         Mood and Affect: Mood and affect normal.         Speech: Speech normal.         Behavior: Behavior is cooperative.       Assessment and Plan:   The following treatment plan was discussed:     1. Pneumonia due to COVID-19 virus    2. Type 2 diabetes mellitus with other specified complication, unspecified whether long term insulin use (HCC)    3. Acute respiratory failure with hypoxia ([facility])    4. Oxygen dependent      CSI: 4  Day of Symptoms:  27   Decadron:yes.    Remdesivir:no   Anticoagulation: no  O2 Day: 3-4 L   O2 Night:3-4 L.   Next appointment: 5/25/2021  Time Spent:   overall greater than 40.       Additional comments: Patient is currently on 4 L during the visit today and oxygenation sats remain in the low 90s.  Had lengthy conversation with patient and her niece today the importance of conducting incentive spirometry exercises every hour.  Also discussed that patient will need to conduct aggressive breathing exercises as patient was hospitalized for an extended amount of time with other comorbidities.  I do not anticipate an aggressive weaning and perhaps at the end of the course for [facility] patient most likely will require oxygen thereafter.  Also discussed that patient may have underlying sleep apnea issues and strongly encourage patient to discuss such with her primary care on May 18.  Also of note encourage patient to restart Lasix as she previously was on 40 mg  twice a day-encourage patient to begin once a day however discuss further potassium supplementation with her PCP if needed as this too was previously writing such.  Discussed requirements for weaning that if patient is improving and her oxygenation requirements are decreasing i.e. saturations in the low 90s at rest without symptoms encourage patient to decrease to 3-2.5 although she may need to continue with more concentration with exertion and getting ready.  Patient was given very strict precautions that in the event symptoms worsen or her oxygenation begins to drop patient is to have evaluation through the urgent care at that time or furthermore in the ED as she is failing outpatient therapy.  Patient and her niece both agree with the plan and clearly understand.  Next appointment is on May 25.    Please note that this dictation was created using voice recognition software. I have made every reasonable attempt to correct obvious errors, but I expect that there are errors of grammar and possibly content that I did not discover before finalizing the note.            Please note that this dictation was created using voice recognition software. I have made every reasonable attempt to correct obvious errors, but I expect that there are errors of grammar and possibly content that I did not discover before finalizing the note.    Note electronically signed by Miguel Mercedes PA-C

## 2021-05-14 NOTE — TELEPHONE ENCOUNTER
Patient got a alert for low oxygen levels. I called the patient and she is doing fine just going to restroom.

## 2021-05-14 NOTE — TELEPHONE ENCOUNTER
Patient got a alert for low oxygen level. Patient is just moving around and is going to the restroom.

## 2021-05-14 NOTE — TELEPHONE ENCOUNTER
Patient got 2 alert 1st 79% at 0607 and 2nd alert 83% at 0612. I talk to the patient she state she doing fine she doesn't know why she keeps alerts patient was just siting on the bed relaxing and she gets alerts.

## 2021-05-14 NOTE — TELEPHONE ENCOUNTER
Patient got alerts. 1st alert 77%at 0336 and 2nd alert at 84% at 0340. patients was moving bed and she is doing ok

## 2021-05-14 NOTE — TELEPHONE ENCOUNTER
Called pt because she keeps maintaining low oxygen level. Pt stated she's on 3L oxygen and doing fine her phone displaying 89% not 49%.

## 2021-05-15 ENCOUNTER — TELEPHONE (OUTPATIENT)
Dept: OTHER | Facility: MEDICAL CENTER | Age: 51
End: 2021-05-15

## 2021-05-15 VITALS — OXYGEN SATURATION: 77 % | HEART RATE: 98 BPM | RESPIRATION RATE: 20 BRPM

## 2021-05-15 VITALS — OXYGEN SATURATION: 68 % | HEART RATE: 94 BPM | RESPIRATION RATE: 18 BRPM

## 2021-05-15 VITALS — RESPIRATION RATE: 18 BRPM | HEART RATE: 94 BPM | OXYGEN SATURATION: 93 %

## 2021-05-15 VITALS — HEART RATE: 19 BPM | OXYGEN SATURATION: 63 % | RESPIRATION RATE: 19 BRPM

## 2021-05-15 VITALS — RESPIRATION RATE: 21 BRPM | HEART RATE: 83 BPM | OXYGEN SATURATION: 83 %

## 2021-05-15 VITALS — HEART RATE: 95 BPM | OXYGEN SATURATION: 92 % | RESPIRATION RATE: 23 BRPM

## 2021-05-15 VITALS — OXYGEN SATURATION: 84 % | RESPIRATION RATE: 20 BRPM | HEART RATE: 92 BPM

## 2021-05-15 VITALS — RESPIRATION RATE: 19 BRPM | OXYGEN SATURATION: 86 % | HEART RATE: 78 BPM

## 2021-05-15 VITALS — RESPIRATION RATE: 16 BRPM | HEART RATE: 89 BPM | OXYGEN SATURATION: 83 %

## 2021-05-15 VITALS — RESPIRATION RATE: 18 BRPM | HEART RATE: 86 BPM | OXYGEN SATURATION: 82 %

## 2021-05-15 VITALS — OXYGEN SATURATION: 87 % | RESPIRATION RATE: 21 BRPM | HEART RATE: 88 BPM

## 2021-05-15 NOTE — TELEPHONE ENCOUNTER
Patient called at 2035 that she going to get disconnected. Patient is going to jump in the shower.

## 2021-05-15 NOTE — TELEPHONE ENCOUNTER
Pt alerted. Oxygen went down to 86% Called pt, she is feeling fine. I was talking to pt and her O2  Went back up to 93%.

## 2021-05-15 NOTE — TELEPHONE ENCOUNTER
Pt alerted, oxygen dropped down to 84%. Called pt she states she is feeling fine. While talking to pt her O2 went back up to 92% Will continue to monitor.

## 2021-05-15 NOTE — TELEPHONE ENCOUNTER
Patient got 2 alert for low oxygen level. 1st alert 83% at 0303, and her 2nd alert 81% at 0308. Patient was falling asleep and moving around

## 2021-05-15 NOTE — TELEPHONE ENCOUNTER
Patient got a alert. Patient was falling asleep, patient state she breath thought  her mouth instead of her nose and that why her oxygen levels goes down.

## 2021-05-15 NOTE — TELEPHONE ENCOUNTER
Patient got alert for low oxygen levels. I called the patient she was just sitiing in the bed and she state she is doing good.

## 2021-05-15 NOTE — TELEPHONE ENCOUNTER
Patient got 2 alert, 1 at 0400 at 68% and her second  83% at 0409. Patient was sleep. Patient state it comes low when she snores or open

## 2021-05-15 NOTE — TELEPHONE ENCOUNTER
Patient got a alert for low oxygen at 83%. Patient is doing fine she was just walking to the restroom.

## 2021-05-15 NOTE — TELEPHONE ENCOUNTER
Pt alerted, oxygen dropped down to 82%. Called pt she is feeling fine. While talking to pt her oxygen went up to 93%. Will continue to monitor.

## 2021-05-15 NOTE — TELEPHONE ENCOUNTER
Pt alerted. Oxygen dropped down to 63%. Called pt she was sleeping. I asked pt if she is feeling ok. She states she is feeling. I asked her if her oxygen is in her nose and if she is on 4L of O2. She states that she is on 3L of O2. I asked her to please go up a liter of oxygen. O2 went back up to 93%. Will continue to monitor.

## 2021-05-16 ENCOUNTER — TELEPHONE (OUTPATIENT)
Dept: OTHER | Facility: MEDICAL CENTER | Age: 51
End: 2021-05-16

## 2021-05-16 VITALS — HEART RATE: 90 BPM | RESPIRATION RATE: 18 BRPM | OXYGEN SATURATION: 89 %

## 2021-05-16 VITALS — OXYGEN SATURATION: 74 % | HEART RATE: 100 BPM | RESPIRATION RATE: 19 BRPM

## 2021-05-16 VITALS — OXYGEN SATURATION: 83 % | HEART RATE: 98 BPM | RESPIRATION RATE: 20 BRPM

## 2021-05-16 VITALS — HEART RATE: 104 BPM | RESPIRATION RATE: 24 BRPM | OXYGEN SATURATION: 81 %

## 2021-05-16 VITALS — RESPIRATION RATE: 21 BRPM | HEART RATE: 88 BPM | OXYGEN SATURATION: 93 %

## 2021-05-16 VITALS — HEART RATE: 94 BPM | OXYGEN SATURATION: 78 % | RESPIRATION RATE: 20 BRPM

## 2021-05-16 VITALS — OXYGEN SATURATION: 82 % | RESPIRATION RATE: 18 BRPM | HEART RATE: 99 BPM

## 2021-05-16 VITALS — OXYGEN SATURATION: 94 % | RESPIRATION RATE: 19 BRPM | HEART RATE: 95 BPM

## 2021-05-16 VITALS — OXYGEN SATURATION: 90 % | HEART RATE: 90 BPM | RESPIRATION RATE: 20 BRPM

## 2021-05-16 VITALS — HEART RATE: 87 BPM | RESPIRATION RATE: 16 BRPM | OXYGEN SATURATION: 90 %

## 2021-05-16 VITALS — RESPIRATION RATE: 22 BRPM | OXYGEN SATURATION: 83 % | HEART RATE: 94 BPM

## 2021-05-16 VITALS — OXYGEN SATURATION: 94 % | HEART RATE: 94 BPM | RESPIRATION RATE: 21 BRPM

## 2021-05-16 NOTE — TELEPHONE ENCOUNTER
Patient alerted for low oxygen reading if 73%. Called patient and patient stated she was fine  Will continue to monitor.

## 2021-05-16 NOTE — TELEPHONE ENCOUNTER
Contacted patient to see if she was okay. Patient stated she was fine.   Will continue to monitor

## 2021-05-16 NOTE — TELEPHONE ENCOUNTER
Called patient to inform of low oxygen reading. Patient stated she was feeling fine. Told rpm that she was going to use the bathroom and that it will most likely go down.   Will continue to monitor.

## 2021-05-16 NOTE — TELEPHONE ENCOUNTER
Called patient to inform of alert for low oxygen. Patient stated she was feeling fine.  Will continue to monitor

## 2021-05-16 NOTE — TELEPHONE ENCOUNTER
Called patient to see if she was doing okay. Patient alerted to 55%.  Patient stated she felt fine. Oxygen reading is back to 89%

## 2021-05-16 NOTE — TELEPHONE ENCOUNTER
Patient alerted for low oxygen. I called, patient was fine, she had been snoring in her sleep. She has a doctors appointment on Tuesday and she said she would bring it up then since this has been a recurrent incident where she snores and her oxygen drops.

## 2021-05-16 NOTE — TELEPHONE ENCOUNTER
Called patient to see if she was feeling okay. Patient stated she was fine.   Patient went right back up into the 90's.  Will continue to monitor.

## 2021-05-16 NOTE — TELEPHONE ENCOUNTER
Patient alerted for low oxygen. I called, she was fine, she had been resituating herself in bed. Her oxygen increased immediately.

## 2021-05-16 NOTE — TELEPHONE ENCOUNTER
Patient alerted for low oxygen. I called patient, she was fine and had been using the restroom. Patient's oxygen increased almost immediately.

## 2021-05-16 NOTE — TELEPHONE ENCOUNTER
Patient alerted for low oxygen. I called, she was fine, she had been resituating in bed. Her oxygen increased.

## 2021-05-16 NOTE — TELEPHONE ENCOUNTER
Patient alerted for low oxygen and went right back up to 94%  Per patient all is okay   Will continue to monitor.

## 2021-05-17 ENCOUNTER — TELEPHONE (OUTPATIENT)
Dept: OTHER | Facility: MEDICAL CENTER | Age: 51
End: 2021-05-17

## 2021-05-17 VITALS — RESPIRATION RATE: 21 BRPM | HEART RATE: 104 BPM | OXYGEN SATURATION: 91 %

## 2021-05-17 VITALS — HEART RATE: 106 BPM | OXYGEN SATURATION: 81 % | RESPIRATION RATE: 19 BRPM

## 2021-05-17 VITALS — OXYGEN SATURATION: 55 % | HEART RATE: 90 BPM | RESPIRATION RATE: 22 BRPM

## 2021-05-17 VITALS — RESPIRATION RATE: 20 BRPM | OXYGEN SATURATION: 16 % | HEART RATE: 96 BPM

## 2021-05-17 VITALS — OXYGEN SATURATION: 69 % | HEART RATE: 85 BPM | RESPIRATION RATE: 16 BRPM

## 2021-05-17 VITALS — OXYGEN SATURATION: 83 % | RESPIRATION RATE: 14 BRPM | HEART RATE: 103 BPM

## 2021-05-17 VITALS — HEART RATE: 93 BPM | OXYGEN SATURATION: 84 % | RESPIRATION RATE: 21 BRPM

## 2021-05-17 VITALS — OXYGEN SATURATION: 76 % | RESPIRATION RATE: 25 BRPM | HEART RATE: 94 BPM

## 2021-05-17 VITALS — OXYGEN SATURATION: 87 % | RESPIRATION RATE: 22 BRPM | HEART RATE: 109 BPM

## 2021-05-17 VITALS — HEART RATE: 99 BPM | OXYGEN SATURATION: 84 % | RESPIRATION RATE: 21 BRPM

## 2021-05-17 VITALS — OXYGEN SATURATION: 87 % | HEART RATE: 103 BPM | RESPIRATION RATE: 27 BRPM

## 2021-05-17 VITALS — RESPIRATION RATE: 16 BRPM | HEART RATE: 100 BPM | OXYGEN SATURATION: 49 %

## 2021-05-17 VITALS — HEART RATE: 111 BPM | OXYGEN SATURATION: 82 % | RESPIRATION RATE: 27 BRPM

## 2021-05-17 NOTE — TELEPHONE ENCOUNTER
Pt alerted low O2, called pt and she stated the O2 had falling out of her nose while she was sleeping. I had pt put O2 back on and her O2 started to rise again.

## 2021-05-17 NOTE — TELEPHONE ENCOUNTER
Patient was at the bathroom per her nephew. She state she doing fine and patient in the bathroom. Patient alert again while I was typing 77% at 1221.

## 2021-05-17 NOTE — TELEPHONE ENCOUNTER
Patient got a alert for low oxygen level at 16%. I call the patient and she was getting and walking around.

## 2021-05-17 NOTE — TELEPHONE ENCOUNTER
Patient got a alert for low oxygen level. While calling the patient her oxygen levels went back up to 92%. Patient state she doing fine.

## 2021-05-17 NOTE — TELEPHONE ENCOUNTER
Pt alerted low O2 called pt and she stated she was just sleeping and that she was ok. Pt's O2 went right back  Up

## 2021-05-17 NOTE — TELEPHONE ENCOUNTER
Patient got a alert for low oxygen level. While calling the patient there oxygen level came back to 91%. Patient state she doing fine and that she in bathroom.

## 2021-05-17 NOTE — TELEPHONE ENCOUNTER
Patient alerted for low oxygen and I called. Patient was fine and had been sleeping. She took some deep breaths through her nose and her oxygen increased.

## 2021-05-18 ENCOUNTER — TELEPHONE (OUTPATIENT)
Dept: OTHER | Facility: MEDICAL CENTER | Age: 51
End: 2021-05-18

## 2021-05-18 VITALS — HEART RATE: 96 BPM | OXYGEN SATURATION: 59 % | RESPIRATION RATE: 18 BRPM

## 2021-05-18 VITALS — RESPIRATION RATE: 20 BRPM | OXYGEN SATURATION: 55 % | HEART RATE: 99 BPM

## 2021-05-18 VITALS — RESPIRATION RATE: 21 BRPM | HEART RATE: 102 BPM | OXYGEN SATURATION: 67 %

## 2021-05-18 VITALS — OXYGEN SATURATION: 75 % | RESPIRATION RATE: 23 BRPM | HEART RATE: 108 BPM

## 2021-05-18 VITALS — OXYGEN SATURATION: 81 % | HEART RATE: 103 BPM | RESPIRATION RATE: 22 BRPM

## 2021-05-18 VITALS — RESPIRATION RATE: 16 BRPM | HEART RATE: 87 BPM | OXYGEN SATURATION: 93 %

## 2021-05-18 VITALS — HEART RATE: 89 BPM | OXYGEN SATURATION: 84 % | RESPIRATION RATE: 19 BRPM

## 2021-05-18 VITALS — OXYGEN SATURATION: 77 % | RESPIRATION RATE: 20 BRPM | HEART RATE: 88 BPM

## 2021-05-18 VITALS — RESPIRATION RATE: 19 BRPM | OXYGEN SATURATION: 87 % | HEART RATE: 98 BPM

## 2021-05-18 VITALS — OXYGEN SATURATION: 72 % | RESPIRATION RATE: 21 BRPM | HEART RATE: 113 BPM

## 2021-05-18 VITALS — OXYGEN SATURATION: 83 % | RESPIRATION RATE: 21 BRPM | HEART RATE: 97 BPM

## 2021-05-18 VITALS — HEART RATE: 91 BPM | RESPIRATION RATE: 19 BRPM | OXYGEN SATURATION: 82 %

## 2021-05-18 NOTE — TELEPHONE ENCOUNTER
Patient is sleeping. While talking to the patient she got a 2nd alert for low oxygen level 83%. Patient is going to find different way to lay down so her oxygen level stays steady.

## 2021-05-18 NOTE — TELEPHONE ENCOUNTER
Pt alerted for low oxygen at 0300.   She stated that she felt fine. Oxygen levels went back up to normal while we were on the phone.

## 2021-05-18 NOTE — TELEPHONE ENCOUNTER
Pt alerted for low oxygen at 0225. She stated that she felt fine. Oxygen levels went back to normal while we were on the phone.

## 2021-05-18 NOTE — TELEPHONE ENCOUNTER
Pt alerted for low oxygen at 0450. She stated that she felt fine. Her oxygen levels went back to normal while we were on the phone.

## 2021-05-18 NOTE — TELEPHONE ENCOUNTER
Pt alerted for low oxygen at 1830. She stated that she felt fine and was just going to the bathroom.

## 2021-05-18 NOTE — TELEPHONE ENCOUNTER
Pt alerted for low oxygen at 0421. She stated that she felt fine. Oxygen saturation went back to normal shortly afterwards.

## 2021-05-18 NOTE — TELEPHONE ENCOUNTER
Pt alerted for low oxygen at 2241. She stated that she felt fine. Oxygen went back to normal shortly after.

## 2021-05-18 NOTE — TELEPHONE ENCOUNTER
Pt alerted at 0319 and 0324 for low oxygen. Per nurse on shift, she was told she could increase supplemental oxygen from 3L to 4L.

## 2021-05-18 NOTE — TELEPHONE ENCOUNTER
Pt alerted for low oxygen at 0434. She stated that she felt fine and her oxygen levels went back to normal while we were on the phone. Pt stated that her oxygen levels keep dropping because when we falls asleep she starts to snore, causing her to breath out of her mouth and not her nose. Per the nurse on shift, I advised her to put her oxygen in her mouth instead of her nose when she sleeps.

## 2021-05-18 NOTE — TELEPHONE ENCOUNTER
Pt alerted for low oxygen at 0312. She stated that she felt fine. Oxygen went back up to normal while we were on the phone.

## 2021-05-18 NOTE — TELEPHONE ENCOUNTER
Patient state she doing fine. Patient state she is breathing thought her mouth and not from her nose. Patient oxygen went back up to 94%.

## 2021-05-18 NOTE — TELEPHONE ENCOUNTER
Patient was sleeping and when she snores her oxygen level drops low. Patient state she doing good.

## 2021-05-18 NOTE — TELEPHONE ENCOUNTER
Pt alerted for low oxygen at 0401. Her oxygen had fallen out of her nose. She stated that she felt fine. Her oxygen levels went back up to normal shortly after.

## 2021-05-19 ENCOUNTER — TELEPHONE (OUTPATIENT)
Dept: OTHER | Facility: MEDICAL CENTER | Age: 51
End: 2021-05-19

## 2021-05-19 VITALS — HEART RATE: 105 BPM | RESPIRATION RATE: 19 BRPM | OXYGEN SATURATION: 80 %

## 2021-05-19 VITALS — RESPIRATION RATE: 21 BRPM | OXYGEN SATURATION: 66 % | HEART RATE: 106 BPM

## 2021-05-19 VITALS — OXYGEN SATURATION: 84 % | HEART RATE: 114 BPM | RESPIRATION RATE: 23 BRPM

## 2021-05-19 VITALS — HEART RATE: 95 BPM | RESPIRATION RATE: 20 BRPM | OXYGEN SATURATION: 71 %

## 2021-05-19 VITALS — HEART RATE: 92 BPM | OXYGEN SATURATION: 86 % | RESPIRATION RATE: 14 BRPM

## 2021-05-19 VITALS — OXYGEN SATURATION: 68 % | HEART RATE: 101 BPM | RESPIRATION RATE: 16 BRPM

## 2021-05-19 VITALS — OXYGEN SATURATION: 83 % | HEART RATE: 113 BPM

## 2021-05-19 VITALS — HEART RATE: 114 BPM | OXYGEN SATURATION: 80 % | RESPIRATION RATE: 21 BRPM

## 2021-05-19 VITALS — RESPIRATION RATE: 18 BRPM | HEART RATE: 98 BPM | OXYGEN SATURATION: 79 %

## 2021-05-19 NOTE — TELEPHONE ENCOUNTER
Pt alerted for low oxygen at 0258. She stated that she felt fine and had gotten up to go to the bathroom. Her levels went back to normal shortly after.

## 2021-05-19 NOTE — TELEPHONE ENCOUNTER
Pt alerted for low oxygen at 0145. She stated that she felt fine. Her oxygen levels went back to normal while we were on the phone.

## 2021-05-19 NOTE — TELEPHONE ENCOUNTER
Called patient she advised she is okay and I asked her to check the tape to if it was okay because her o2 drops then recovers she advised it good

## 2021-05-19 NOTE — TELEPHONE ENCOUNTER
Pt alerted for low oxygen at 0106. She stated that she felt fine. Oxygen levels went back up to normal while we were on the phone.

## 2021-05-20 ENCOUNTER — TELEPHONE (OUTPATIENT)
Dept: OTHER | Facility: MEDICAL CENTER | Age: 51
End: 2021-05-20

## 2021-05-20 VITALS — HEART RATE: 91 BPM | RESPIRATION RATE: 17 BRPM | OXYGEN SATURATION: 82 %

## 2021-05-20 VITALS — OXYGEN SATURATION: 85 % | RESPIRATION RATE: 19 BRPM | HEART RATE: 102 BPM

## 2021-05-20 VITALS — HEART RATE: 102 BPM | RESPIRATION RATE: 20 BRPM | OXYGEN SATURATION: 77 %

## 2021-05-20 VITALS — RESPIRATION RATE: 41 BRPM | OXYGEN SATURATION: 79 % | HEART RATE: 100 BPM

## 2021-05-20 VITALS — RESPIRATION RATE: 21 BRPM | HEART RATE: 104 BPM | OXYGEN SATURATION: 83 %

## 2021-05-20 VITALS — OXYGEN SATURATION: 86 % | RESPIRATION RATE: 27 BRPM | HEART RATE: 95 BPM

## 2021-05-20 VITALS — OXYGEN SATURATION: 79 % | RESPIRATION RATE: 20 BRPM | HEART RATE: 101 BPM

## 2021-05-20 VITALS — RESPIRATION RATE: 19 BRPM | OXYGEN SATURATION: 84 % | HEART RATE: 83 BPM

## 2021-05-20 VITALS — RESPIRATION RATE: 19 BRPM | OXYGEN SATURATION: 47 % | HEART RATE: 102 BPM

## 2021-05-20 VITALS — OXYGEN SATURATION: 85 %

## 2021-05-20 VITALS — HEART RATE: 99 BPM | OXYGEN SATURATION: 83 % | RESPIRATION RATE: 25 BRPM

## 2021-05-20 NOTE — TELEPHONE ENCOUNTER
Pt alerted for low oxygen at 0458. She stated that she felt fine and was going to take more deep breaths through her nose.

## 2021-05-20 NOTE — TELEPHONE ENCOUNTER
Pt alerted for low oxygen at 0131. She stated that she felt fine and had gotten up to use the bathroom. Levels went back to normal shortly after.

## 2021-05-20 NOTE — TELEPHONE ENCOUNTER
Pt alerted for low oxygen saturation at 0355. She stated that she felt fine. I advised her to take deep breaths through her nose. Her oxygen levels went back to normal while we were on the phone.

## 2021-05-20 NOTE — TELEPHONE ENCOUNTER
Pt alerted for low oxygen saturation at 0334. She stated that she felt fine. Her oxygen levels went back to normal shortly after.

## 2021-05-20 NOTE — TELEPHONE ENCOUNTER
Pt alerted for low oxygen at 0450. She stated that she felt fine. Oxygen went back up to normal while we were on the phone.

## 2021-05-21 ENCOUNTER — TELEPHONE (OUTPATIENT)
Dept: OTHER | Facility: MEDICAL CENTER | Age: 51
End: 2021-05-21

## 2021-05-21 VITALS — HEART RATE: 75 BPM | OXYGEN SATURATION: 85 % | RESPIRATION RATE: 17 BRPM

## 2021-05-21 VITALS — HEART RATE: 84 BPM | OXYGEN SATURATION: 81 % | RESPIRATION RATE: 12 BRPM

## 2021-05-21 VITALS — OXYGEN SATURATION: 53 %

## 2021-05-21 VITALS — OXYGEN SATURATION: 86 %

## 2021-05-21 VITALS — OXYGEN SATURATION: 84 %

## 2021-05-21 VITALS — HEART RATE: 91 BPM | RESPIRATION RATE: 23 BRPM | OXYGEN SATURATION: 96 %

## 2021-05-21 VITALS — OXYGEN SATURATION: 73 % | RESPIRATION RATE: 12 BRPM

## 2021-05-21 VITALS — HEART RATE: 81 BPM | OXYGEN SATURATION: 40 % | RESPIRATION RATE: 18 BRPM

## 2021-05-21 VITALS — OXYGEN SATURATION: 56 %

## 2021-05-21 VITALS — HEART RATE: 112 BPM | OXYGEN SATURATION: 84 % | RESPIRATION RATE: 21 BRPM

## 2021-05-21 VITALS — OXYGEN SATURATION: 81 %

## 2021-05-21 NOTE — TELEPHONE ENCOUNTER
Patient alerted for low oxygen. I called, she was fine and had been blowing her nose. Her oxygen increased.

## 2021-05-21 NOTE — TELEPHONE ENCOUNTER
Patient alerted for low oxygen. Called patient and she was fine, had been using the restroom. Her oxygen increased immediately.

## 2021-05-21 NOTE — TELEPHONE ENCOUNTER
Called patient after alert for low oxygen. She was fine and had been sleeping. Her oxygen started to increase.

## 2021-05-21 NOTE — TELEPHONE ENCOUNTER
Patient alerted for low oxygen. When I called, she was fine, she had been sleeping and snoring. Her oxygen increased immediately.

## 2021-05-21 NOTE — TELEPHONE ENCOUNTER
Pt alerted, oxygen dropped down to 40%. I called pt she states she's feeling fine, pt is waking up. While talking to pt her oxygen went back up to 94%. Will continue to monitor.

## 2021-05-21 NOTE — TELEPHONE ENCOUNTER
Patient alerted for low oxygen, she was fine. She was sleeping. Her oxygen increased immediately.

## 2021-05-21 NOTE — TELEPHONE ENCOUNTER
Pt alerted with low SpO2 at 85% Called pt, she was walking to the restroom. Pt is feeling fine. Pt's oxygen went back to 92%. Will continue to monitor.

## 2021-05-21 NOTE — TELEPHONE ENCOUNTER
Patient alerted for low oxygen and I called. She was fine and her oxygen increased shortly after.

## 2021-05-21 NOTE — TELEPHONE ENCOUNTER
Pt alerted with low SpO2 of 84%. Called pt, she up using the restroom. She is feeling fine.  Her oxygen  is  back up to 94%.  Will continue to monitor.

## 2021-05-21 NOTE — TELEPHONE ENCOUNTER
I called after patient alerted for low oxygen. She was fine and had been sleeping. Her oxygen increased immediately.

## 2021-05-21 NOTE — TELEPHONE ENCOUNTER
Patient alerted and I called. She was fine and was going to try to sleep on her stomach to see if that works with keeping her oxygen up.

## 2021-05-21 NOTE — TELEPHONE ENCOUNTER
Patient alerted for low oxygen. I called and she was fine. She had been sleeping again, possibly snoring. Her oxygen increased.

## 2021-05-21 NOTE — TELEPHONE ENCOUNTER
Called pt to ask if someone in her family can come to [facility] to   a oxygen mask that she can use at night.  Pt states that her niece might be able to. I asked her to please call [facility] to confirm the . Will follow up with pt in a few hours.

## 2021-05-22 ENCOUNTER — TELEPHONE (OUTPATIENT)
Dept: OTHER | Facility: MEDICAL CENTER | Age: 51
End: 2021-05-22

## 2021-05-22 VITALS — HEART RATE: 86 BPM | RESPIRATION RATE: 23 BRPM | OXYGEN SATURATION: 83 %

## 2021-05-22 VITALS — RESPIRATION RATE: 23 BRPM | HEART RATE: 102 BPM | OXYGEN SATURATION: 53 %

## 2021-05-22 VITALS — OXYGEN SATURATION: 35 % | RESPIRATION RATE: 22 BRPM | HEART RATE: 101 BPM

## 2021-05-22 VITALS — HEART RATE: 92 BPM | RESPIRATION RATE: 26 BRPM | OXYGEN SATURATION: 84 %

## 2021-05-22 VITALS — HEART RATE: 98 BPM | RESPIRATION RATE: 17 BRPM | OXYGEN SATURATION: 52 %

## 2021-05-22 VITALS — HEART RATE: 98 BPM | RESPIRATION RATE: 96 BRPM | OXYGEN SATURATION: 88 %

## 2021-05-22 VITALS — HEART RATE: 102 BPM | OXYGEN SATURATION: 87 % | RESPIRATION RATE: 20 BRPM

## 2021-05-22 VITALS — RESPIRATION RATE: 18 BRPM | HEART RATE: 99 BPM | OXYGEN SATURATION: 65 %

## 2021-05-22 VITALS — RESPIRATION RATE: 15 BRPM | OXYGEN SATURATION: 90 % | HEART RATE: 94 BPM

## 2021-05-22 VITALS — HEART RATE: 95 BPM | OXYGEN SATURATION: 66 % | RESPIRATION RATE: 22 BRPM

## 2021-05-22 VITALS — HEART RATE: 102 BPM | RESPIRATION RATE: 28 BRPM | OXYGEN SATURATION: 93 %

## 2021-05-22 VITALS — RESPIRATION RATE: 18 BRPM | HEART RATE: 95 BPM | OXYGEN SATURATION: 57 %

## 2021-05-22 NOTE — TELEPHONE ENCOUNTER
Called pt, stated she's fine. I asked if her finger probe needs to be change pt stated no it's fine. Will continue to monitor pt.

## 2021-05-22 NOTE — TELEPHONE ENCOUNTER
Pt alerted with low SpO2 of 65%. Called pt she was sleeping. I asked her if she was wearing her oxygen, she said. While talking to pt her O2 went back up to 92%

## 2021-05-22 NOTE — TELEPHONE ENCOUNTER
2058 Called patient due to her Sp02 dropping to 73%. Patient stated doing well, she was using the restroom.

## 2021-05-22 NOTE — TELEPHONE ENCOUNTER
Pt alerted with low Sp02 of 66%. Called pt, she is feeling fine. Pt was sleeping. While talking to pt her oxygen went back up to 92%

## 2021-05-22 NOTE — TELEPHONE ENCOUNTER
0405 Called patient due to her Sp02 dropping to 59%. Patient stated to be doing well, she has her oxygen on and was just sleeping. Sp02 came back up to 92%

## 2021-05-22 NOTE — TELEPHONE ENCOUNTER
Pt alerted with low SpO2 of 84%. I called pt, she is feeling fine. Pt is watching videos on her phone. Pt states her [employer] wallace shows her SpO2 at 91%.  Pt's oxygen is slowing go up to 88%. Will continue to monitor.

## 2021-05-22 NOTE — TELEPHONE ENCOUNTER
Pt alerted for low SpO2 of 88%. Called pt she was up using the restroom. While talking to pt  Her oxygen went back up to 93%. Will continue to monitor.

## 2021-05-22 NOTE — TELEPHONE ENCOUNTER
[id number] Called patient due to her Sp02 dropping to 35%. She stated being ok and that she was just moving around in her bed but, feeling well. Sp02 went back up to 91% while on the phone call.

## 2021-05-22 NOTE — TELEPHONE ENCOUNTER
Pt alerted with low SpO2 of 87%. Called pt, she states she is feeling fine. Pt's oxygen went up to 92%

## 2021-05-23 ENCOUNTER — TELEPHONE (OUTPATIENT)
Dept: OTHER | Facility: MEDICAL CENTER | Age: 51
End: 2021-05-23

## 2021-05-23 VITALS — HEART RATE: 112 BPM | RESPIRATION RATE: 32 BRPM | OXYGEN SATURATION: 78 %

## 2021-05-23 VITALS — OXYGEN SATURATION: 92 % | HEART RATE: 110 BPM

## 2021-05-23 VITALS — HEART RATE: 96 BPM | OXYGEN SATURATION: 87 % | RESPIRATION RATE: 22 BRPM

## 2021-05-23 VITALS — HEART RATE: 90 BPM | RESPIRATION RATE: 27 BRPM | OXYGEN SATURATION: 84 %

## 2021-05-23 VITALS — HEART RATE: 93 BPM | OXYGEN SATURATION: 68 % | RESPIRATION RATE: 21 BRPM

## 2021-05-23 VITALS — HEART RATE: 104 BPM | RESPIRATION RATE: 19 BRPM | OXYGEN SATURATION: 93 %

## 2021-05-23 VITALS — RESPIRATION RATE: 17 BRPM | OXYGEN SATURATION: 83 % | HEART RATE: 120 BPM

## 2021-05-23 VITALS — RESPIRATION RATE: 31 BRPM | HEART RATE: 94 BPM | OXYGEN SATURATION: 90 %

## 2021-05-23 VITALS — RESPIRATION RATE: 19 BRPM | HEART RATE: 97 BPM | OXYGEN SATURATION: 84 %

## 2021-05-23 NOTE — TELEPHONE ENCOUNTER
Pt alerted with low SpO2 of 87%.  Called pt, she was up in the kitchen eating some lunch.  Pt sat down and while on the phone with the her oxygen went back up to 91% Will continue to monitor.

## 2021-05-23 NOTE — TELEPHONE ENCOUNTER
Pt alerted low O2, I called the pt and she stated she was in the bathroom and she was going back up now.

## 2021-05-23 NOTE — TELEPHONE ENCOUNTER
Pt alerted low O2 called pt she ststed she was just sleeping, pt's O2 went back up and pt said she felt ok.

## 2021-05-23 NOTE — TELEPHONE ENCOUNTER
Pt alerted with low SpO2 of 83% . Called pt she states she is feeling ok. She was up using the restroom. Pt is now sitting down while I'm on the phone with her. Pt's oxygen went  back to 90%. Will continue  To monitor

## 2021-05-23 NOTE — TELEPHONE ENCOUNTER
Pt SpO2 went to 84%. Called pt, her daughter answered her phone. She states that pt is using the restroom. I asked if pt is feeling ok. I heard pt say is is feeling fine. After a few  minutes pt oxygen went back up to 93%. Will continue to monitor.

## 2021-05-23 NOTE — TELEPHONE ENCOUNTER
Pt alerted low O2 called pt to check in pt stated she was just using the restroom and needed a couple minutes.

## 2021-05-24 ENCOUNTER — TELEPHONE (OUTPATIENT)
Dept: OTHER | Facility: MEDICAL CENTER | Age: 51
End: 2021-05-24

## 2021-05-24 VITALS — OXYGEN SATURATION: 67 % | RESPIRATION RATE: 18 BRPM | HEART RATE: 94 BPM

## 2021-05-24 VITALS — RESPIRATION RATE: 24 BRPM | OXYGEN SATURATION: 89 % | HEART RATE: 101 BPM

## 2021-05-24 VITALS — OXYGEN SATURATION: 76 % | RESPIRATION RATE: 28 BRPM | HEART RATE: 104 BPM

## 2021-05-24 VITALS — OXYGEN SATURATION: 88 % | RESPIRATION RATE: 23 BRPM | HEART RATE: 102 BPM

## 2021-05-24 VITALS — HEART RATE: 98 BPM | RESPIRATION RATE: 32 BRPM | OXYGEN SATURATION: 92 %

## 2021-05-24 VITALS — HEART RATE: 103 BPM | RESPIRATION RATE: 23 BRPM | OXYGEN SATURATION: 87 %

## 2021-05-24 VITALS — HEART RATE: 91 BPM | RESPIRATION RATE: 22 BRPM | OXYGEN SATURATION: 82 %

## 2021-05-24 VITALS — OXYGEN SATURATION: 77 % | RESPIRATION RATE: 24 BRPM | HEART RATE: 104 BPM

## 2021-05-24 VITALS — HEART RATE: 99 BPM | OXYGEN SATURATION: 96 % | RESPIRATION RATE: 19 BRPM

## 2021-05-24 NOTE — TELEPHONE ENCOUNTER
0008  Called patient due to her Sp02 dropping down to 76%.  Patient stated to be using the restroom but, feeling well.

## 2021-05-24 NOTE — TELEPHONE ENCOUNTER
Pt alerted low O2, called pt and asked if she was ok her O2 had dropped pt explained that she was in the restroom and asked for a couple minutes.

## 2021-05-24 NOTE — TELEPHONE ENCOUNTER
Pt alerted low O2, called pt she stated she was just sleeping, asked pt if she was feeling badly she stated she was ok just sleeping.

## 2021-05-24 NOTE — TELEPHONE ENCOUNTER
1953 Called patient due to her Sp02 dropping to 63%, stated that she was walking around the kitchen but, doing well. Her Sp02 came back up to 89% while on the phone.

## 2021-05-24 NOTE — TELEPHONE ENCOUNTER
Pt alerted low O2 called pt and she stated she was just sleeping, suggested to pt that maybe she try sleeping sitting a bit up, pt said she'd try it.

## 2021-05-24 NOTE — TELEPHONE ENCOUNTER
Patient got up to brush her teeth and is getting ready to start their day. Patient state she is doing fine.

## 2021-05-24 NOTE — TELEPHONE ENCOUNTER
Pt alerted low O2 called pt and pt stated she was sleeping on her side and would try to sleep sitting up.

## 2021-05-24 NOTE — TELEPHONE ENCOUNTER
I called the patient, She state she is doing fine and that she was getting up to go to the bathroom.    Olga Jason (MD), Pediatrics  [address]  [address]  [place] [address]  Phone: [phone]  Fax: [phone]

## 2021-05-24 NOTE — TELEPHONE ENCOUNTER
Pt alerted low O2 called pt, pt stated she was just sleeping on her side, pt's O2 went right back up.

## 2021-05-25 ENCOUNTER — TELEPHONE (OUTPATIENT)
Dept: OTHER | Facility: MEDICAL CENTER | Age: 51
End: 2021-05-25

## 2021-05-25 ENCOUNTER — OFFICE VISIT (OUTPATIENT)
Dept: URGENT CARE | Facility: CLINIC | Age: 51
End: 2021-05-25
Payer: MEDICAID

## 2021-05-25 VITALS — OXYGEN SATURATION: 77 % | RESPIRATION RATE: 14 BRPM | HEART RATE: 89 BPM

## 2021-05-25 VITALS — HEART RATE: 118 BPM | OXYGEN SATURATION: 62 % | RESPIRATION RATE: 30 BRPM

## 2021-05-25 VITALS — RESPIRATION RATE: 18 BRPM | OXYGEN SATURATION: 64 % | HEART RATE: 98 BPM

## 2021-05-25 VITALS — OXYGEN SATURATION: 87 % | HEART RATE: 99 BPM | RESPIRATION RATE: 18 BRPM

## 2021-05-25 VITALS — RESPIRATION RATE: 24 BRPM | HEART RATE: 99 BPM | OXYGEN SATURATION: 87 %

## 2021-05-25 VITALS — RESPIRATION RATE: 27 BRPM | OXYGEN SATURATION: 84 % | HEART RATE: 90 BPM

## 2021-05-25 VITALS — HEART RATE: 100 BPM | RESPIRATION RATE: 24 BRPM | OXYGEN SATURATION: 89 %

## 2021-05-25 VITALS — OXYGEN SATURATION: 89 % | HEART RATE: 99 BPM | RESPIRATION RATE: 31 BRPM

## 2021-05-25 VITALS — HEART RATE: 109 BPM | RESPIRATION RATE: 27 BRPM | OXYGEN SATURATION: 79 %

## 2021-05-25 VITALS — HEART RATE: 90 BPM | OXYGEN SATURATION: 82 % | RESPIRATION RATE: 15 BRPM

## 2021-05-25 VITALS — HEART RATE: 101 BPM | RESPIRATION RATE: 22 BRPM

## 2021-05-25 VITALS — RESPIRATION RATE: 18 BRPM | OXYGEN SATURATION: 90 % | HEART RATE: 100 BPM

## 2021-05-25 VITALS — HEART RATE: 104 BPM | RESPIRATION RATE: 24 BRPM | OXYGEN SATURATION: 84 %

## 2021-05-25 VITALS — HEART RATE: 106 BPM | RESPIRATION RATE: 34 BRPM | OXYGEN SATURATION: 83 %

## 2021-05-25 NOTE — TELEPHONE ENCOUNTER
Called patient to inform of low oxygen reading. Patient stated she was fine.  Will continue to monitor

## 2021-05-25 NOTE — TELEPHONE ENCOUNTER
Pt alerted low O2 pt stated that she is having problem with the sensor sticking to her finger, pt stated it keep slipping off. Told pt to try her best to keep it with maybe a band aide. Pt stated she was going to try to figure out how to keep it on she has been feeling good and just trying to get some sleep.

## 2021-05-25 NOTE — TELEPHONE ENCOUNTER
Per triage officer we dont have to call pt every alert bust can call pt in four hours to allow her to sleep as pt has had issues with the sensor staying attached to her finger. Pt will receive next check in call at 7am.

## 2021-05-25 NOTE — TELEPHONE ENCOUNTER
Patient got a alert for low oxygen levels. Patient state she doing good and that she lower her oxygen to 3L. Patient oxygen levels is now 94% at 1355.

## 2021-05-25 NOTE — TELEPHONE ENCOUNTER
Patient's monitoring equipment won't stay on her finger.  She has tried taping it and changing it to the point where her finger has turned purple.  It is causing multiple alarms.  Per patient, she feels fine and is not in any distress.      Messaged Dr. Gagnon about the equipment.  He stated it is okay to call her every four hours until the equipment can be addressed on day shift.  Updated Adenike Davies on Dr. Gagnon's instructions.      Keke called the patient to update her.  Patient stated she is okay being called all the time and would prefer to be called every time she alarms even if it seems to be related to the equipment.

## 2021-05-25 NOTE — TELEPHONE ENCOUNTER
0618   Called patient due to her Sp02 dropping to 64%. States to be doing well, she was just going to the restroom.

## 2021-05-25 NOTE — TELEPHONE ENCOUNTER
Patient was sleeping on the side and her oxygen levels went down. Patient state she doing fine. Patient increase her oxygen levels to 91%.

## 2021-05-25 NOTE — TELEPHONE ENCOUNTER
Pt alerted low O2, pt stated she has her O2 turn up to 5 to try and keep her O2 level up, but that she is just having a hard time with the sensor staying on and her wallace reading her levels ok.

## 2021-05-25 NOTE — TELEPHONE ENCOUNTER
Patient went to restroom to blow her nose its super dry patient is using 5 L for now . Patient  State she doing fine.

## 2021-05-26 ENCOUNTER — TELEPHONE (OUTPATIENT)
Dept: OTHER | Facility: MEDICAL CENTER | Age: 51
End: 2021-05-26

## 2021-05-26 ENCOUNTER — OFFICE VISIT (OUTPATIENT)
Dept: URGENT CARE | Facility: CLINIC | Age: 51
End: 2021-05-26
Payer: MEDICAID

## 2021-05-26 VITALS — RESPIRATION RATE: 48 BRPM | HEART RATE: 97 BPM | OXYGEN SATURATION: 83 %

## 2021-05-26 VITALS — HEART RATE: 103 BPM | RESPIRATION RATE: 21 BRPM | OXYGEN SATURATION: 77 %

## 2021-05-26 VITALS — HEART RATE: 100 BPM | OXYGEN SATURATION: 81 % | RESPIRATION RATE: 20 BRPM

## 2021-05-26 VITALS — HEART RATE: 101 BPM | OXYGEN SATURATION: 75 % | RESPIRATION RATE: 21 BRPM

## 2021-05-26 VITALS — OXYGEN SATURATION: 82 % | RESPIRATION RATE: 19 BRPM | HEART RATE: 101 BPM

## 2021-05-26 VITALS — OXYGEN SATURATION: 88 % | HEART RATE: 100 BPM | RESPIRATION RATE: 29 BRPM

## 2021-05-26 VITALS — RESPIRATION RATE: 16 BRPM | OXYGEN SATURATION: 79 % | HEART RATE: 91 BPM

## 2021-05-26 VITALS — OXYGEN SATURATION: 89 %

## 2021-05-26 VITALS — HEART RATE: 97 BPM | OXYGEN SATURATION: 92 % | RESPIRATION RATE: 22 BRPM

## 2021-05-26 VITALS — HEART RATE: 100 BPM | OXYGEN SATURATION: 77 % | RESPIRATION RATE: 23 BRPM

## 2021-05-26 VITALS — OXYGEN SATURATION: 92 % | HEART RATE: 98 BPM | RESPIRATION RATE: 23 BRPM

## 2021-05-26 DIAGNOSIS — J12.82 PNEUMONIA DUE TO COVID-19 VIRUS: ICD-10-CM

## 2021-05-26 DIAGNOSIS — U07.1 PNEUMONIA DUE TO COVID-19 VIRUS: ICD-10-CM

## 2021-05-26 NOTE — TELEPHONE ENCOUNTER
Due to an Internet outage at 0219 am I called pt to let pt know that our Internet was out and I couldn't see them on my monitor, I asked pt for their stats off their [employer] wallace, pt provided them let pt know I would be calling every 4 hours unless our Internet came back up before then.

## 2021-05-26 NOTE — PROGRESS NOTES
Virtual Visit: Established Patient   This evaluation was conducted via Telephone The patient was in a private location in the state of [place].    The patient's identity was confirmed and verbal consent was obtained for this virtual visit.    Subjective:   CC:   Chief Complaint   Patient presents with   • Covid-19 Home Monitoring Video Visit       Moni Wray is a 50 y.o. female presenting for evaluation and management of:    Covid pneumonia w/ hypoxia    Home Monitoring Patient Triage  COVID-19 Monitoring Level:         [facility] Oxygen Flowsheet   1. Current O2 Flow Rate:  3-4 L  2. Record COVID-19 Severity Index (qCSI):   6  3.Confirm appropriate patient and chart with two identifiers:   yes  4. Days Since Onset of Symptoms:  38  5. Confirm O2 supply is working and there are no cannula problems:  yes  6. Is your breathing today better or worse?  better  7. Issues tolerating foods and fluids today?  no  8. Any vomiting or diarrhea in the last 24 hours?  no  9. Issues controlling fevers?  no  10. Issues controlling cough?  no   12. Review ER precautions: present to ED or call 911 if experiencing severe shortness of breath:  yes  13. Confirm next VV:  yes  14. Final disposition:  Continue O2 use and follow up with PCP  15. Time Spent:  20 min      ROS   Denies any recent fevers or chills. No nausea or vomiting. No chest pains or shortness of breath.     No Known Allergies    Current medicines (including changes today)  Current Outpatient Medications   Medication Sig Dispense Refill   • Empagliflozin-metFORMIN HCl (SYNJARDY) 12.5-1000 MG Tab Take 1 tablet by mouth 2 times a day.     • Aspirin Powder Take 1 Dose by mouth every 6 hours as needed (for pain). Unknown dose     • naproxen (ANAPROX) 220 MG tablet Take 440 mg by mouth 2 times a day as needed.     • furosemide (LASIX) 40 MG Tab Take 40 mg by mouth 2 times a day.       No current facility-administered medications for this visit.        Patient Active Problem List    Diagnosis Date Noted   • Acute respiratory failure with hypoxia (HCC) 04/20/2021   • Type 2 diabetes mellitus (HCC) 04/20/2021   • Pulmonary infiltrates 04/20/2021   • Pulmonary HTN (HCC) 04/20/2021   • Morbidly obese (HCC) 04/20/2021   • Pneumonia due to COVID-19 virus 04/20/2021       No family history on file.    She  has a past medical history of Diabetes (HCC).  She  has a past surgical history that includes cholecystectomy.       Objective:   There were no vitals taken for this visit.    Physical Exam:  Unable to perform    Assessment and Plan:   The following treatment plan was discussed:   Discussion was done over the phone with a .  Patient is a 50-year-old female who presents for monitoring after discharge of Covid pneumonia.  Patient continues to be 3 to 4L of oxygen daily.  She is continuing short of breath with short ambulation and oxygenation decreases at night.  She will need to continue to be on oxygen and follow-up with her primary care.  She will continue 3 L during the day and 4 L at nighttime.    1. Pneumonia due to COVID-19 virus  REFERRAL TO PULMONARY AND SLEEP MEDICINE    REFERRAL TO DISCHARGE REMOTE PATIENT MONITORING         Follow-up: No follow-ups on file.

## 2021-05-26 NOTE — TELEPHONE ENCOUNTER
Patient had her last visit and provider placed in Final Disposition: Continue 02 use and follow up with PCP. Reached out to Dr. Yo to see if patient can be referred to the pulmonary clinic and be graduated from the program. Dr. Yo will refer patient and patient can be removed from program.

## 2021-05-26 NOTE — TELEPHONE ENCOUNTER
Pt alerted low O2 called pt and the pt did not answer called back and her daughter answered the phone and stated that she was just in restroom and pt was ok.

## 2021-05-26 NOTE — TELEPHONE ENCOUNTER
Pt alerted low O2, called pt she said she felt as though the O2 wasn't flowing strong enough asked to pt to look at the hose to see if any kinks or anything was blocking it, she stated nothing was and that she wants to turn the O2 to 4.

## 2021-05-26 NOTE — ADDENDUM NOTE
Addended by: TANVIR CASTILLO on: 5/26/2021 04:16 PM     Modules accepted: Orders, Level of Service, SmartSet

## 2021-05-26 NOTE — TELEPHONE ENCOUNTER
Pt alerted low O2 called pt, pt was sleeping on her side again suggested trying to sleep sitting up.

## 2021-05-26 NOTE — TELEPHONE ENCOUNTER
Called patient, she stated she was feeling fine and that she's going to get up to go to the bathroom.

## 2021-05-26 NOTE — TELEPHONE ENCOUNTER
Pt alerted low O2 pt stated she was just getting herself ready for bed. Told pt I would give her some time to get ready for bed before calling again.

## 2021-05-26 NOTE — TELEPHONE ENCOUNTER
Pt alerted low O2 called pt and she stated she was still in the bathroom having some stomach issues.

## 2021-05-26 NOTE — TELEPHONE ENCOUNTER
Pt alerted low O2 called pt, pt was sleeping on side again. Told pt to try sleeping in a different position

## 2021-05-26 NOTE — TELEPHONE ENCOUNTER
Pt alerted low O2, called pt to check in pt stated she was trying to use the restroom. Told pt I would give her some time.

## 2021-05-26 NOTE — TELEPHONE ENCOUNTER
Pt alerted low O2 called pt to see if she was ok, pt stated she was ok and that she was just moving around.

## 2021-07-12 ENCOUNTER — TELEPHONE (OUTPATIENT)
Dept: OTHER | Facility: MEDICAL CENTER | Age: 51
End: 2021-07-12

## 2023-04-13 ENCOUNTER — HOSPITAL ENCOUNTER (OUTPATIENT)
Dept: RADIOLOGY | Facility: MEDICAL CENTER | Age: 53
End: 2023-04-13
Attending: COLON & RECTAL SURGERY
Payer: MEDICAID

## 2023-04-13 DIAGNOSIS — Z01.818 PREOP EXAMINATION: ICD-10-CM

## 2023-06-09 ENCOUNTER — HOSPITAL ENCOUNTER (OUTPATIENT)
Dept: CARDIOLOGY | Facility: MEDICAL CENTER | Age: 53
End: 2023-06-09
Attending: SURGERY
Payer: MEDICAID

## 2023-06-09 LAB — EKG IMPRESSION: NORMAL

## 2023-06-09 PROCEDURE — 93005 ELECTROCARDIOGRAM TRACING: CPT | Performed by: SURGERY

## 2023-06-09 PROCEDURE — 93010 ELECTROCARDIOGRAM REPORT: CPT | Performed by: INTERNAL MEDICINE

## 2024-01-15 NOTE — TELEPHONE ENCOUNTER
[facility]   Progress Note              Subjective:  Patient seen and examined by me.  No acute events overnight.  Tolerating tube feeds. However pt again with bloody ooze around peg tube site. Hgb down 1U, from acute blood loss vs dilutional as all cbc slightly down. Discussed with GI. Will hold anticoagulation for today.     14 point Review of systems is negative except for as noted above.     I/O's  No intake or output data in the 24 hours ending 01/15/24 1144        ALLERGIES:  Patient has no known allergies.     No data recorded  MAP (mmHg)  Av.5  Min: 77  Max: 99          HOSPITAL MEDS  Current Facility-Administered Medications   Medication Dose Route Frequency Provider Last Rate Last Admin    hydroCHLOROthiazide (HYDRODIURIL) tablet 25 mg  25 mg Oral Daily Sallie Harris MD   25 mg at 01/15/24 0905    modafinil (PROVIGIL) tablet 100 mg  100 mg Oral Daily Niesha Allen MD   100 mg at 01/15/24 09    [Held by provider] enoxaparin (LOVENOX) injection 80 mg  1 mg/kg Subcutaneous Q12H Niesha Allen MD   80 mg at 01/15/24 0905    ceFAZolin (ANCEF) syringe 2,000 mg  2,000 mg Intravenous On Call to Procedure Eva Paula, RILEY        levothyroxine (SYNTHROID, LEVOTHROID) tablet 50 mcg  50 mcg Per NG Tube QAM AC Niesha Allen MD   50 mcg at 01/15/24 0558    atorvastatin (LIPITOR) tablet 40 mg  40 mg Per NG Tube Nightly Niesha Allen MD   40 mg at 24 2228    lisinopril (ZESTRIL) tablet 40 mg  40 mg Per NG Tube Daily Niesha Allen MD   40 mg at 01/15/24 09    aspirin chewable 81 mg  81 mg Per NG Tube Daily Niesha Allen MD   81 mg at 01/15/24 0905    amantadine (SYMMETREL) capsule 100 mg  100 mg Per NG Tube 2 times per day Niesha Allen MD   100 mg at 01/15/24 09    hydrALAZINE (APRESOLINE) tablet 100 mg  100 mg Per NG Tube TID Niesha Allen MD   100 mg at 01/15/24 0558    amLODIPine (NORVASC) tablet 10 mg  10 mg Per NG Tube Daily  Patient said that her daughter took her phone too [employer] to get it fixed and that's why she is disconnected. Patient stated she was fine.    Niesha Allen MD   10 mg at 01/15/24 0905    cloNIDine (CATAPRES) tablet 0.3 mg  0.3 mg Per NG Tube 2 times per day Niesha Allen MD   0.3 mg at 01/15/24 0905    WARFARIN - PHARMACIST MONITORED Misc   Does not apply See Admin Instructions Scott Davies MD        insulin lispro (ADMELOG,HumaLOG) - Correction Dose   Subcutaneous 4x Daily AC & HS Scott Davies MD   3 Units at 01/08/24 0812    [Held by provider] insulin glargine (LANTUS) injection 15 Units  15 Units Subcutaneous Daily Jennifer Orozco NP   15 Units at 12/25/23 0829    Potassium Standard Replacement Protocol (Levels 3.5 and lower)   Does not apply See Admin Instructions Jennifer Orozco NP        Potassium Replacement (Levels 3.6 - 4)   Does not apply See Admin Instructions Jennifer Orozco NP        Magnesium Standard Replacement Protocol   Does not apply See Admin Instructions Jennifer Orzoco NP        Phosphorus Standard Replacement Protocol   Does not apply See Admin Instructions Jennifer Orozco NP        sodium chloride 0.9 % injection 2 mL  2 mL Intracatheter 2 times per day Lillie Faust MD   2 mL at 01/15/24 0922       Current Facility-Administered Medications   Medication Dose Route Frequency Provider Last Rate Last Admin       Current Facility-Administered Medications   Medication Dose Route Frequency Provider Last Rate Last Admin    hydrALAZINE (APRESOLINE) injection 10 mg  10 mg Intravenous Q6H PRN Romie Garcias MD   10 mg at 01/14/24 0432    acetaminophen (TYLENOL) tablet 650 mg  650 mg Per NG Tube Q4H PRN Niesha Allen MD   650 mg at 01/14/24 0644    Or    acetaminophen (TYLENOL) suppository 650 mg  650 mg Rectal Q4H PRN Niesha Allen MD        dextrose (GLUTOSE) 40 % gel 15 g  15 g Per NG Tube PRN Niesha Allen MD        dextrose (GLUTOSE) 40 % gel 30 g  30 g Per NG Tube PRN Niesha Allen MD        magnesium hydroxide (MILK OF MAGNESIA) 400 MG/5ML suspension 30 mL  30 mL Per NG Tube Daily PRN Tiffany  MD Niesha        polyethylene glycol (MIRALAX) packet 17 g  17 g Per NG Tube BID PRN Niesha Allen MD        docusate sodium-sennosides (SENOKOT S) 50-8.6 MG 2 tablet  2 tablet Per NG Tube BID PRN Niesha Allen MD        sodium chloride 0.9 % flush bag 25 mL  25 mL Intravenous PRN Jennifer Orozco NP        sodium chloride 0.9 % flush bag 25 mL  25 mL Intravenous PRN Jennifer Orozco NP        ondansetron (ZOFRAN ODT) disintegrating tablet 4 mg  4 mg Oral Q12H PRN Jennifer Orozco NP        Or    ondansetron (ZOFRAN) injection 4 mg  4 mg Intravenous Q12H PRN Jennifer Orozco NP        bisacodyl (DULCOLAX) suppository 10 mg  10 mg Rectal Daily PRN Jennifer Orozco NP        sodium chloride 0.9 % flush bag 25 mL  25 mL Intravenous PRN Lillie Faust MD        dextrose 50 % injection 25 g  25 g Intravenous PRN Capo Archibald MD        dextrose 50 % injection 12.5 g  12.5 g Intravenous PRN Capo Archibald MD        glucagon (GLUCAGEN) injection 1 mg  1 mg Intramuscular PRN Capo Archibald MD              Last Recorded Vitals      SpO2 Readings from Last 3 Encounters:   01/15/24 95%   12/14/23 95%        VITAL SIGNS:     Vital Last Value 24 Hour Range   Temperature 98.8 °F (37.1 °C) (01/15/24 0732) Temp  Min: 96.4 °F (35.8 °C)  Max: 98.8 °F (37.1 °C)   Pulse 66 (01/15/24 0732) Pulse  Min: 60  Max: 72   Respiratory 18 (01/15/24 0732) Resp  Min: 14  Max: 18   Non-Invasive  Blood Pressure 127/69 (01/15/24 0732) BP  Min: 105/62  Max: 149/74   Pulse Oximetry 95 % (01/15/24 0732) SpO2  Min: 92 %  Max: 95 %   Arterial   Blood Pressure   No data recorded      Vital Today Admitted   Weight 78.5 kg (173 lb 1 oz) (12/23/23 0000) Weight: 74.4 kg (164 lb 0.4 oz) (12/15/23 0148)   Height N/A Height: 5' 4\" (162.6 cm) (12/15/23 0253)   BMI N/A BMI (Calculated): 28.15 (12/15/23 0253)            Physical Exam:  General: no acute distress, drowsy  Eyes: no scleral icterus, no conjunctival erythema   Cardio: S1, S2, RRR, no  murmur, rub, gallop or thrills noted.   Pulm: Lungs clear to auscultation bilaterally, no wheeze or rhonchi noted.   GI: Soft, non-tender, nondistended. Normal bowel sounds auscultated.  PEG tube in place, again dark red blood mildly oozing from site.  Abdominal binder in place as well  Ext: No upper or lower extremity edema noted. No cords palpated.   Skin: R arm in compression wrap. No abnormal bruising or discoloration noted. No jaundice.   Psych: Calm and cooperative, drowsy.  Poor Insight and Judgment  Neuro: Right-sided hemiparesis. Pt appropriately follows commands on left side.    Labs   Recent Labs     01/13/24  0537 01/14/24  0540 01/15/24  0653   WBC 21.0* 21.6* 20.7*   RBC 3.78* 3.75* 3.27*   HGB 10.8* 10.7* 9.4*   HCT 33.1* 32.4* 28.9*    334 302   MCV 87.6 86.4 88.4   MCH 28.6 28.5 28.7   MCHC 32.6 33.0 32.5   NRBCRE 0 0 0           Recent Labs     01/13/24  0536 01/14/24  0540 01/15/24  0653   SODIUM 132* 133* 132*   POTASSIUM 3.9 3.9 4.4   CO2 22 27 27   ANIONGAP 12 9 8   GLUCOSE 139* 150* 172*   BUN 14 16 21*   CREATININE 0.70 0.72 0.83   CALCIUM 10.1 9.3 9.0   BILIRUBIN  --   --  0.2   AST  --   --  18   GPT  --   --  11   ALKPT  --   --  111   GLOB  --   --  3.6   AGR  --   --  0.7*          Recent Labs   Lab 01/15/24  0653 01/14/24  0540 01/13/24  0536   SODIUM 132* 133* 132*   POTASSIUM 4.4 3.9 3.9   CHLORIDE 101 101 102   CO2 27 27 22   BUN 21* 16 14   CREATININE 0.83 0.72 0.70   GLUCOSE 172* 150* 139*   ALBUMIN 2.4*  --   --    AST 18  --   --    BILIRUBIN 0.2  --   --          No results for input(s): \"PCT\" in the last 72 hours.     No results found     Recent Labs     01/13/24  0536 01/14/24  0540 01/15/24  0653   INR 1.1 1.0 1.0   PT 11.9* 11.0 10.7         Recent Labs   Lab 01/14/24  1159 01/14/24  1635 01/14/24  2040 01/15/24  0733   GLUCOSE BEDSIDE 163* 142* 131* 157*         Imaging    EGD w Tube Placement; PEG   Final Result      XR NASOGASTRIC TUBE CHECK ABDOMEN   Final Result       FL VIDEO SWALLOW   Final Result   FINDINGS/IMPRESSION:   Tracheal aspiration observed during the study   Mild degenerative spondylosis of the lower cervical spine noted   Limited visualized cervical esophagus demonstrates no constricting or   obstructing or concerning mucosal abnormality.      PLEASE SEE DETAILED SPEECH PATHOLOGY DEPARTMENT REPORT CONCERNING   ASPIRATION AND FOR RECOMMENDATIONS            Electronically Signed by: LUCA OCAMPO MD    Signed on: 1/8/2024 5:14 PM    Workstation ID: [id number]      XR CHEST AP OR PA   Final Result   Impression:    No acute cardiopulmonary abnormality.      Electronically Signed by: RUPESH MCGARRY MD    Signed on: 1/5/2024 3:30 PM    Workstation ID: [id number]       VASC EXTREMITY UPPER VENOUS DUPLEX   Final Result   No evidence of acute DVT of the investigated right upper extremity.               Electronically Signed by: RUPESH BOSS MD    Signed on: 1/4/2024 11:47 AM    Workstation ID: [id number]      XR LUMBAR SPINE 2 OR 3 VIEWS   Final Result      Accounting for differences in imaging modality, similar mild compression   fracture deformity of the L1 vertebral body superior endplate, as above.               Electronically Signed by: MARIA L PACHECO MD    Signed on: 1/1/2024 9:24 AM    Workstation ID: [id number]      MRI LUMBAR SPINE W WO CONTRAST   Final Result      1.   Acute compression fracture deformity along the superior endplate of   the L1 vertebral body with less <25% vertebral body height loss and minimal   bony retropulsion. Associated bone marrow edema and enhancement at this   level are likely secondary to recent fracture. Discitis-osteomyelitis   unlikely.      2.   Acute/subacute fracture of the right medial 12th rib with mild   enhancement.       3.   Mild bone marrow edema and enhancement in the L5-S1 facet joints,   likely representing active degenerative changes.      4.   No significant spinal canal narrowing. Mild left  neuroforaminal   narrowing at L4-5.      Electronically Signed by: TOD BROWNLEE MD    Signed on: 12/28/2023 1:59 PM    Workstation ID: [id number]      CT CHEST ABDOMEN PELVIS W CONTRAST   Final Result      1.   Significantly limited exam as above. No specific findings of infection   in the chest.   2.   Questionable subtle patchy hypoenhancement in the bilateral kidneys,   as can be seen with pyelonephritis/ascending urinary tract infection. Given   mild bladder wall thickening, correlate with urinalysis.   3.   Age-indeterminate mild superior compression fracture deformity of the   L1 vertebral body with associated vacuum disc phenomenon. Given clinical   context, superimposed developing discitis osteomyelitis is not entirely   excluded. Recommend further evaluation with dedicated spine imaging.   4.   Moderate mixed atherosclerotic disease of the aorta, mostly involving   the thoracic aorta where there is multifocal mural thrombus involving the   descending thoracic aorta. Evaluation for penetrating atherosclerotic ulcer   is limited secondary to artifacts, however further evaluation with a   dedicated CTA of the chest could be performed if clinically warranted.   5.   Additional ancillary findings as above.            Electronically Signed by: MARIA L PACHECO MD    Signed on: 12/27/2023 4:33 PM    Workstation ID: [id number]      XR CHEST AP OR PA   Final Result      No acute abnormalities.      Electronically Signed by: TOD MEJIA M.D.    Signed on: 12/27/2023 9:22 AM    Workstation ID: [id number]      CT HEAD WO CONTRAST   Final Result      No acute intracranial findings.      Evolving subacute large infarct in the posterior left MCA territory with   decreased edema and mass effect compared to 12/18/2023.      Electronically Signed by: RAVEN PANDEY M.D.    Signed on: 12/26/2023 6:58 PM    Workstation ID: [id number]      XR SHOULDER 3 VIEWS RIGHT   Final Result   FINDINGS/IMPRESSION:    Oblique minimally displaced fracture at the mid right clavicular shaft.   Glenohumeral joint is anatomically aligned. Soft tissues unremarkable.      Electronically Signed by: DIVYA CRUZ MD    Signed on: 12/27/2023 7:44 AM    Workstation ID: [id number]      XR CHEST PA AND LATERAL 2 VIEWS   Final Result      Possible dislocation of the right shoulder. Follow-up recommended.      Mild increased pulmonary vascularity         Electronically Signed by: SHELBIE HULL M.D    Signed on: 12/25/2023 3:58 PM    Workstation ID: [id number]      MRI BRAIN WO CONTRAST   Final Result      1.   Redemonstrated large subacute infarct in the left middle cerebral   artery territory. Unchanged minimal midline shift. No acute intracranial   hemorrhage.   2.   Punctate subacute infarct in the right periatrial white matter.      Electronically Signed by: SABRINA DUMONT MD    Signed on: 12/22/2023 8:29 AM    Workstation ID: [id number]      FL VIDEO SWALLOW   Final Result   FINDINGS/IMPRESSION:   Enteric feeding tube. Van tracheal aspiration observed during the study.   Cough response.   Limited visualized cervical esophagus demonstrates no constricting or   obstructing or concerning mucosal abnormality.      PLEASE SEE DETAILED SPEECH PATHOLOGY DEPARTMENT REPORT CONCERNING   ASPIRATION AND  FOR RECOMMENDATIONS            Electronically Signed by: LUCA OCAMPO MD    Signed on: 12/20/2023 5:19 PM    Workstation ID: [id number]      XR CHEST AP OR PA   Final Result   Impression:    No acute cardiopulmonary abnormality.      Electronically Signed by: RUPESH MCGARRY MD    Signed on: 12/19/2023 6:43 AM    Workstation ID: [id number]      XR ABDOMEN 1 VIEW   Final Result   Impression:   NG tube demonstrated in the left upper quadrant of the abdomen the region   of the stomach. Subtle density in the left lower lobe is new since previous   examination correlate with clinical exam for infiltrate. Minimal   atelectasis  right lung base. Correlate for possible aspiration.   Degenerative changes spinal axis. Bowel gas pattern is otherwise normal.         Remainder of the exam is otherwise stable.                         Electronically Signed by: RYNE LOU MD    Signed on: 12/18/2023 11:57 PM    Workstation ID: [id number]      XR CHEST AP OR PA   Final Result      CT HEAD WO CONTRAST   Final Result   There is a moderate to large area of low density noted extending to the   cortex involving the left posterior frontal lobe, left parietal lobe and   the posterior left temporal lobe. This is compatible with left MCA   territory subacute infarct. Mild mass effect on the left lateral ventricle.   2 mm midline shift to the right.      No evidence of acute intracranial hemorrhage or hydrocephalus.      Electronically Signed by: EDWINA VILCHIS MD    Signed on: 12/18/2023 9:07 AM    Workstation ID: [id number]      XR CHEST AP OR PA   Final Result   FINDINGS/IMPRESSION:   1 view(s).      Feeding tube is below the diaphragm and the distal tip is not included in   the field-of-view. Linear opacities in the lung bases likely atelectasis.   There is no infiltrate or effusion or pneumothorax. Cardiomediastinal   silhouette is stable. Degenerative changes.                  Electronically Signed by: SHAHEEN SALDAÑA M.D.    Signed on: 12/17/2023 11:43 AM    Workstation ID: [id number]      CT HEAD WO CONTRAST   Final Result   There is a moderate to large area of low density noted in the left   parietal, temporal posterior frontal lobes and the left basal ganglia   region compatible with the developing left MCA stroke. There is mild mass   effect on the left lateral ventricle with approximately 2.0 mm midline   shift to the right. No evidence of acute intracranial hemorrhage is seen.         Electronically Signed by: EDWINA VILCHIS MD    Signed on: 12/17/2023 10:00 AM    Workstation ID: [id number]      CT HEAD WO CONTRAST   Final Result       No evidence of acute intracranial hemorrhage, hydrocephalus, or midline   shift.  There are low-density changes extending to the cortex in the left   parietal, left temporal and posterior left insula region, compatible with   evolving left MCA stroke.      Electronically Signed by: EDWINA VILCHIS MD    Signed on: 12/16/2023 8:10 AM    Workstation ID: [id number]      XR NASOGASTRIC TUBE CHECK ABDOMEN   Final Result   FINDINGS/IMPRESSION:        Limited view of the abdomen was performed to evaluate for Enteric tube   placement. Enteric tube terminates in the proximal stomach. Nonobstructive   bowel gas pattern in the upper abdomen.      Electronically Signed by: MARIA L PACHECO MD    Signed on: 12/15/2023 11:41 AM    Workstation ID: [id number]      XR ABDOMEN 1 VIEW   Final Result      Enteric tube terminates in the gastric cardia/gastroesophageal junction.   Recommend advancing by approximately 6 cm.               Electronically Signed by: MILA PAUL M.D.    Signed on: 12/15/2023 11:38 AM    Workstation ID: [id number]      XR CHEST AP OR PA   Final Result   Impression:       Low lung volumes. No radiographic evidence of acute cardiopulmonary   disease. Further evaluation with a CT chest can be performed if clinically   warranted.      Electronically Signed by: MARIA L PACHECO MD    Signed on: 12/15/2023 7:47 AM    Workstation ID: [id number]      CT HEAD DUAL ENERGY WO CONTRAST   Final Result      Evolving left MCA territory infarct.  No hemorrhage or herniation.            Electronically signed by Mabel Tenorio MD on 12 15 23 at 04:27      IR INTRACRANIAL ARTERY MECHANICAL THROMBECTOMY AND/OR THROMBOLYSIS   Final Result   Impression:      There was complete occlusion of a left M2 branch segment of the MCA   (initial TICI 0). We performed combined aspiration and stent retriever   deployment with TICI 2C recanalization of the vessel after 2 passes.               Electronically Signed by: AN LOMAX     Signed on: 12/15/2023 10:44 AM    Workstation ID: [id number]          Cultures  Microbiology Results       None            All imaging, labs, vitals and related tests have been reviewed and interpreted by me.     Assessment/Plan:     62-year-old female with past medical history significant for HTN, hypothyroidism, and morbid obesity [place] [facility] [place] post TNK and thrombectomy.    #Acute CVA, L MCA  #Right sided weakness, aphasia  #Bilateral carotid disease  #Multifocal aorta mural thrombus  #Oropharyngeal dysphagia s/p PEG   -likely cardio embolic   -s/p TNK and thrombectomy TICI C2 recanalization   -TTE:70%, mild to moderate AS, mild MR, no atrial shunt   -chest CT: mural thrombi in aorta  -ST --> see discussion below  -on ASA, lipitor  -Heme on board, Lupus anticoagulant+, repeat outpatient --> per oncology, repeat test in 12 weeks  -Cardio on board, Holter on discharge  -Discharge plan: Initially planned for [facility] at [facility], patient has already gotten acceptance, however this  and she needs a new PMR consult closer to discharge; evaluation completed on . [facility] accepted, insurance auth pending  -Modafinil initially dc due to concern for contributing to bp, pt more sleepy per , agreed to restart at lower dose today 100mg     #Dysphagia in the setting of recent CVA  #S/p PEG   -As documented in previous note, I had an extensive conversation with speech therapy and the patient's .  Patient has had fluctuating mental status and she remains a risk for aspiration  -Evaluated by speech therapy on Friday; discussed with speech therapy again 2024, patient unfortunately aspirating on video swallow so GI consulted for PEG tube placement.  Discussed with  who was agreeable.  -1/10 NG tube inserted for feeds  -If INR less than 2.5 plan for PEG tube placement tomorrow  - PEG tube placement  -- tolerating PEG tube  feeds well  - 1/14 oozing noted at peg tube site. GI paged for ben. HDS and hgb stable, will continue anticoag  - 1/15 hgb down one, still with oozing around peg. Discussed with GI, will hold anticoag for 24hrs     #Positive Lupus Anticoagulant  #Supratherapeutic INR  -Hematology previously following, recommend repeat lupus anticoagulant in 12 weeks  -INR supratherapeutic today at 4.1, patient without any overt bleeding noted on exam  -Pharmacy to dose Coumadin, currently on hold as it was supratherapeutic and patient will get PEG tube placement soon  -1/11 need to clarify with GI when patient can be reinitiated on warfarin  -1/12 warfarin to be restarted today per GI. Will bridge with therapeutic enoxaparin until INR therapeutic  -1/13 inr 1.1, continue enox and appreciate pharmacy assistance with warfarin dosing  -1/14 inr 1.1. cont enox and warfarin  - 1/15 hgb down one, still with oozing at peg site. Will hold for 24 hrs    #Drop in hgb  - From 1/14 to 1/15, possible dilutional vs acute blood loss anemia from oozing peg  - GI to adjust peg  - F./u hgb     #Transient complete heart block with pauses  -Patient with 7-second pause on telemetry on 1/5/2024  -Patient evaluated by EP, suspect likely due to increased vagal tone so recommending no additional intervention at this time  -Per EP note on 1/6, no indication for pacemaker at this time     #RUE swelling  -venous US without any evidence of acute DVT  -Likely in the setting of immobility     #Persistent leukocytosis  -not septic, s/p abx  -Bl cx NGTD  -no evidence of infection  -FISH, Smear and flow cytometry negative   -procalcitoin and LA WNL  -if patient is febrile, start abx   -Patient with uptrending WBC however CXR without any evidence of aspiration, ID following, continue to monitor off antibiotics --> down trending as of 1/6/24  -ID on consult, appreciate recs     #Acute urinary retention  -Continue bladder protocol, straight cath if > 200cc      #CUCO,  improved     #Anemia, no overt bleeding, H&H stable     #Compression fx  -MRI LS w/wo redemonstrates L1 compression fracture with minimal bony retropulsion, bone marrow edema secondary to fractur   -TLSO when out of bed  -pain control and bowel regimen PRN  -NSGY cleared for discharge, Follow up in 6 weeks for repeat XR     #R. Clavicle fx  -conservative management, WBAT RLE, no overhead movement     #HTN  -SBP goal <160  -patient has been hypertensive for the past 24 hours despite increasing antihypertensives  -continue Norvasc 10mg, lisinopril 40mg, hydralazine 100 mg TID  -Considered switching Norvasc to nifedipine but that cannot be crushed in applesauce so we will continue Norvasc  -Increased clonidine to 0.3 mg twice daily  -Dc modafinil as may be contributing to bp  - Can consider adding bblocker if remains high tomorrow  - 1/11still requiring multiple PRNs despite Norvasc 10 mg, lisinopril 40 mg, hydralazine 100 mg 3 times daily, clonidine 0.3 mg twice daily, discontinuation of modafinil.  Will consult cardiology for further assistance  -1/12 cards evaluation appreciated, will allow permissive hypertension in view of her presentation with acute CVA, hydralazine as needed was changed for SBP over 180  -1/14 adding hctz per cards recs. Will monitor as also adding modafinil  - 1/15 seem to be improving but did require one prn. CTM and adjust as needed     #Hypothyroidism   -on levothyroxine      #DM  -SSI, hypoglycemic protocol     DVT Prophylaxis:   Current Active Medications for DVT Prophylaxis (From admission, onward)           Stop     [Held by provider]  enoxaparin (LOVENOX) injection 80 mg  1 mg/kg,   Subcutaneous,   EVERY 12 HOURS        (Held by provider since Mon 1/15/2024 at 1116 by Niesha Allen MD.Hold Reason: OtherHold Comments: Hold for 1 day.)    --     WARFARIN - PHARMACIST MONITORED Misc  Does not apply,   SEE ADMIN INSTRUCTIONS        See Hyperspace for full Linked Orders Report.    --                    Diet: Nursing to Pass Tray - Feed Patient  Nutrition Communication  No Straws -  Note Continuous  Tube Feeding Diet Glucerna 1.2 Calorie/Diabetic/Glucose Intolerance 1.2 Calorie Formula; Without Diet Tray; Water; Every 4 Hours; 30  Baseline Activity: Ambulates Independently    CODE STATUS:   Code Status: Full Resuscitation    Physician Notification:  Consultants notified of patient via Perfect Serve.  Communication: with patient, nurse, speech, gi, , pharmacy    Primary Care Physician  Tamra Madrigal MD Agata Parfieniuk, MD  [facility] [place] [facility]  1/15/2024 11:44 AM        ]

## 2024-02-26 NOTE — TELEPHONE ENCOUNTER
New start     Post-Care Instructions: I reviewed with the patient in detail post-care instructions. Patient is to wear sunprotection, and avoid picking at any of the treated lesions. Pt may apply Vaseline to crusted or scabbing areas. Render Post-Care Instructions In Note?: no Detail Level: Detailed Show Aperture Variable?: Yes Duration Of Freeze Thaw-Cycle (Seconds): 3 Consent: The patient's consent was obtained including but not limited to risks of crusting, scabbing, blistering, scarring, darker or lighter pigmentary change, recurrence, incomplete removal and infection.